# Patient Record
Sex: MALE | Race: WHITE | Employment: OTHER | URBAN - METROPOLITAN AREA
[De-identification: names, ages, dates, MRNs, and addresses within clinical notes are randomized per-mention and may not be internally consistent; named-entity substitution may affect disease eponyms.]

---

## 2017-05-04 ENCOUNTER — OFFICE VISIT (OUTPATIENT)
Dept: ORTHOPEDIC SURGERY | Facility: CLINIC | Age: 67
End: 2017-05-04

## 2017-05-04 VITALS
WEIGHT: 267 LBS | DIASTOLIC BLOOD PRESSURE: 82 MMHG | HEART RATE: 70 BPM | TEMPERATURE: 95.8 F | BODY MASS INDEX: 33.37 KG/M2 | SYSTOLIC BLOOD PRESSURE: 160 MMHG

## 2017-05-04 DIAGNOSIS — M17.0 BILATERAL PRIMARY OSTEOARTHRITIS OF KNEE: Primary | ICD-10-CM

## 2017-05-04 DIAGNOSIS — R60.0 BILATERAL EDEMA OF LOWER EXTREMITY: ICD-10-CM

## 2017-05-04 DIAGNOSIS — Z86.718 HISTORY OF BLOOD CLOTS: ICD-10-CM

## 2017-05-04 NOTE — MR AVS SNAPSHOT
Visit Information Date & Time Provider Department Dept. Phone Encounter #  
 5/4/2017  8:00 AM Hector Yun MD [address] [facility] [phone] [id number] Upcoming Health Maintenance Date Due Hepatitis C Screening 1950 DTaP/Tdap/Td series (1 - Tdap) 7/17/1973 FOBT Q 1 YEAR AGE 50-75 7/17/2002 ZOSTER VACCINE AGE 60> 7/17/2012 INFLUENZA AGE 9 TO ADULT 8/1/2017 Allergies as of 5/4/2017  Review Complete On: 5/4/2017 By: Hector Yun MD  
  
 Severity Noted Reaction Type Reactions Aspirin  11/12/2012   Intolerance Other (comments) Stomach bleed Metoprolol  06/09/2010    Other (comments) Sexual dysfunction Pcn [Penicillins]  06/09/2010    Rash Current Immunizations  Never Reviewed No immunizations on file. Not reviewed this visit You Were Diagnosed With   
  
 Codes Comments Bilateral primary osteoarthritis of knee    -  Primary ICD-10-CM: M17.0 ICD-9-CM: 715.16 History of blood clots     ICD-10-CM: Z86.718 ICD-9-CM: V12.51 Bilateral edema of lower extremity     ICD-10-CM: R60.0 ICD-9-CM: 953. 3 Vitals BP Pulse Temp Weight(growth percentile) BMI Smoking Status 160/82 (BP 1 Location: Left arm, BP Patient Position: Sitting) 70 95.8 °F (35.4 °C) (Oral) 267 lb (121.1 kg) 33.37 kg/m2 Never Smoker BMI and BSA Data Body Mass Index Body Surface Area  
 33.37 kg/m 2 2.53 m 2 Preferred Pharmacy Pharmacy Name Phone [address] [place] [address], [address] [place] [address] [phone] Your Updated Medication List  
  
   
This list is accurate as of: 5/4/17  9:17 AM.  Always use your most recent med list.  
  
  
  
  
 ACETAMINOPHEN PM PO Take  by mouth. ADVIL LIQUI-GEL PO Take  by mouth. albuterol sulfate 2.5 mg/0.5 mL Nebu nebulizer solution Commonly known as:  PROVENTIL;VENTOLIN  
2.5 mg by Nebulization route once as needed. amLODIPine-benazepril 5-20 mg per capsule Commonly known as:  LOTREL  
  
 BENADRYL ALLERGY PO Take  by mouth. buPROPion  mg SR tablet Commonly known as:  Jules Apo Take  by mouth two (2) times a day. clindamycin 300 mg capsule Commonly known as:  CLEOCIN Take 300 mg by mouth three (3) times daily. fluocinoNIDE 0.05 % topical cream  
Commonly known as:  LIDEX Apply  to affected area two (2) times a day. LORazepam 0.5 mg tablet Commonly known as:  ATIVAN Take 1 Tab by mouth every six (6) hours. Max Daily Amount: 2 mg.  
  
 magnesium oxide 400 mg tablet Commonly known as:  MAG-OX Take 400 mg by mouth daily. OTHER Antibiotic mouthwash PriLOSEC 20 mg capsule Generic drug:  omeprazole Take 20 mg by mouth two (2) times daily (with meals). promethazine 25 mg tablet Commonly known as:  PHENERGAN Take 1 Tab by mouth every six (6) hours as needed for Nausea. tiZANidine 4 mg tablet Commonly known as:  Laretta Bolk Take 2-3 Tabs by mouth three (3) times daily as needed (spasm). Dose change  
  
 triamcinolone acetonide 0.1 % ointment Commonly known as:  KENALOG Apply  to affected area two (2) times a day. use thin layer VITAMIN D2 50,000 unit capsule Generic drug:  ergocalciferol Take 50,000 Units by mouth as directed. warfarin 5 mg tablet Commonly known as:  COUMADIN Take 5 mg by mouth daily. ZOLOFT 100 mg tablet Generic drug:  sertraline Take 100 mg by mouth daily. We Performed the Following AMB POC XRAY, KNEE; COMPLETE, 4+ VIEW [75579 CPT(R)] AMB POC XRAY, KNEE; COMPLETE, 4+ VIEW [32841 CPT(R)] REFERRAL TO VASCULAR SURGERY [QNL879 Custom] Comments:  
 Eval and treat history of blood clots and edema to bilateral lower legs Referral Information Referral ID Referred By Referred To  
  
 [id number] Bean Kent MD   
   [address] [place] [address] Kuldip Calvert [facility] [address], [address] Phone: [phone] Fax: [phone] Visits Status Start Date End Date 1 New Request 5/4/17 5/4/18 If your referral has a status of pending review or denied, additional information will be sent to support the outcome of this decision. Patient Instructions Patient's blood pressure was elevated at today's office visit. Patient instructed to contact  primary care physician for treatment. Please follow up with us after you see Dr. Angeliac Gutierrez. Knee Pain or Injury: Care Instructions Your Care Instructions Injuries are a common cause of knee problems. Sudden (acute) injuries may be caused by a direct blow to the knee. They can also be caused by abnormal twisting, bending, or falling on the knee. Pain, bruising, or swelling may be severe, and may start within minutes of the injury. Overuse is another cause of knee pain. Other causes are climbing stairs, kneeling, and other activities that use the knee. Everyday wear and tear, especially as you get older, also can cause knee pain. Rest, along with home treatment, often relieves pain and allows your knee to heal. If you have a serious knee injury, you may need tests and treatment. Follow-up care is a key part of your treatment and safety. Be sure to make and go to all appointments, and call your doctor if you are having problems. It's also a good idea to know your test results and keep a list of the medicines you take. How can you care for yourself at home? · Be safe with medicines. Read and follow all instructions on the label. ¨ If the doctor gave you a prescription medicine for pain, take it as prescribed. ¨ If you are not taking a prescription pain medicine, ask your doctor if you can take an over-the-counter medicine. · Rest and protect your knee. Take a break from any activity that may cause pain. · Put ice or a cold pack on your knee for 10 to 20 minutes at a time. Put a thin cloth between the ice and your skin. · Prop up a sore knee on a pillow when you ice it or anytime you sit or lie down for the next 3 days. Try to keep it above the level of your heart. This will help reduce swelling. · If your knee is not swollen, you can put moist heat, a heating pad, or a warm cloth on your knee. · If your doctor recommends an elastic bandage, sleeve, or other type of support for your knee, wear it as directed. · Follow your doctor's instructions about how much weight you can put on your leg. Use a cane, crutches, or a walker as instructed. · Follow your doctor's instructions about activity during your healing process. If you can do mild exercise, slowly increase your activity. · Reach and stay at a healthy weight. Extra weight can strain the joints, especially the knees and hips, and make the pain worse. Losing even a few pounds may help. When should you call for help? Call 911 anytime you think you may need emergency care. For example, call if: 
· You have symptoms of a blood clot in your lung (called a pulmonary embolism). These may include: 
¨ Sudden chest pain. ¨ Trouble breathing. ¨ Coughing up blood. Call your doctor now or seek immediate medical care if: 
· You have severe or increasing pain. · Your leg or foot turns cold or changes color. · You cannot stand or put weight on your knee. · Your knee looks twisted or bent out of shape. · You cannot move your knee. · You have signs of infection, such as: 
¨ Increased pain, swelling, warmth, or redness. ¨ Red streaks leading from the knee. ¨ Pus draining from a place on your knee. ¨ A fever. · You have signs of a blood clot in your leg (called a deep vein thrombosis), such as: 
¨ Pain in your calf, back of the knee, thigh, or groin. ¨ Redness and swelling in your leg or groin. Watch closely for changes in your health, and be sure to contact your doctor if: 
· You have tingling, weakness, or numbness in your knee. · You have any new symptoms, such as swelling. · You have bruises from a knee injury that last longer than 2 weeks. · You do not get better as expected. Where can you learn more? Go to http://ravi-salvador.info/. Enter K195 in the search box to learn more about \"Knee Pain or Injury: Care Instructions. \" Current as of: May 27, 2016 Content Version: 11.2 © 4888-6632 [employer]. Care instructions adapted under license by [employer] (which disclaims liability or warranty for this information). If you have questions about a medical condition or this instruction, always ask your healthcare professional. [address] any warranty or liability for your use of this information. Introducing \[facility] [place] [facility]\"" & HEALTH SERVICES! [employer] introduces [employer] patient portal. Now you can access parts of your medical record, email your doctor's office, and request medication refills online. 1. In your internet browser, go to [email or web address] 2. Click on the First Time User? Click Here link in the Sign In box. You will see the New Member Sign Up page. 3. Enter your [employer] Access Code exactly as it appears below. You will not need to use this code after youve completed the sign-up process. If you do not sign up before the expiration date, you must request a new code. · [employer] Access Code: [email or web address] Expires: 8/2/2017  9:17 AM 
 
4. Enter the last four digits of your Social Security Number (xxxx) and Date of Birth (mm/dd/yyyy) as indicated and click Submit. You will be taken to the next sign-up page. 5. Create a [employer] ID. This will be your [employer] login ID and cannot be changed, so think of one that is secure and easy to remember. 6. Create a [employer] password. You can change your password at any time. 7. Enter your Password Reset Question and Answer. This can be used at a later time if you forget your password. 8. Enter your e-mail address. You will receive e-mail notification when new information is available in [address] 9. Click Sign Up. You can now view and download portions of your medical record. 10. Click the Download Summary menu link to download a portable copy of your medical information. If you have questions, please visit the Frequently Asked Questions section of the [employer] website. Remember, [employer] is NOT to be used for urgent needs. For medical emergencies, dial 911. Now available from your iPhone and Android! Please provide this summary of care documentation to your next provider. Your primary care clinician is listed as TOMMIE RASHEED. If you have any questions after today's visit, please call [phone].

## 2017-05-04 NOTE — PROGRESS NOTES
Patient: José Miguel Martinez                MRN: [id number]       SSN: [id number]  YOB: 1950        AGE: 61 y.o. SEX: male  Body mass index is 33.37 kg/(m^2). PCP: Elizabeth Shoemaker MD  05/04/17    HISTORY: Mr. Agustina Wilcox is a very pleasant gentleman who worked previous construction and shipyard tanks. He did a lot of kneeling. He has ended up developing very severe end-stage arthritis involving both knees. In fact, we would have to have a hinge back-up to repair them. He suffers from very quite severe peripheral vascular disease. Apparently he had a couple blood clots in the left arm. Dr. Kate Conti saw him a few years ago. He has known fairly significant and severe venous stasis with at least 2+ pitting edema distally although with good pulse. I am concerned with regards to his vasculature. We are going to get an opinion from Dr. Zachariah Singh. The pain is severe. It wakes him up at night. He walks with a cane. No complaints of groin pain. He denies shortness of breath or chest pain. All systems are reviewed and are positive for lower leg swelling and occasional shortness of breath on exertion. PHYSICAL EXAMINATION:  He is in no acute distress. There is no respiratory compromise or indrawing. There is no scleral icterus. There is no JVD. EOM is normal.  The hips rotate adequately. He has bilateral good pulses. At least 2+ pitting edema distally and fairly significant venous stasis. Compartments are soft. He has fairly significant neuropathy distally as well. He told me he was gored by a goat and had injury to his medial thigh a couple years ago with numbness associated with this. [place] was at a friend's farm. IMPRESSION:  My overall impression is quite severe end-stage arthritis involving both knees. He also has fairly severe venous stasis and lymphedema, peripheral edema. PLAN:  I am going to get an opinion from Dr. Zachariah Singh. The patient is interested in knee replacement surgery.  I will see him back afterwards. I would recommend staging them about three months apart. REVIEW OF SYSTEMS:      CON: negative for weight loss, fever  EYE: negative for double vision  ENT: negative for hoarseness  RS:   negative for Tb  GI:    negative for blood in stool  :  negative for blood in urine  Other systems reviewed and noted below. Past Medical History:   Diagnosis Date    Arthritis     CTS (carpal tunnel syndrome)     DJD (degenerative joint disease) of knee     Bilateral; endstage degenerative arthritis of right knee    Gastric ulcer, unspecified as acute or chronic, without mention of hemorrhage or perforation     patient listed stomach problems or ulcers    Hypertension     Knee pain     Lumbar pain     Medial meniscus tear May 2011    Right knee       Family History   Problem Relation Age of Onset    Hypertension Father     Asthma Father     Arthritis-osteo Father        Current Outpatient Prescriptions   Medication Sig Dispense Refill    DIPHENHYDRAMINE HCL (BENADRYL ALLERGY PO) Take  by mouth.  IBUPROFEN (ADVIL LIQUI-GEL PO) Take  by mouth.  amLODIPine-benazepril (LOTREL) 5-20 mg per capsule       ergocalciferol (VITAMIN D) 50,000 unit capsule Take 50,000 Units by mouth as directed.  buPROPion SR (WELLBUTRIN SR) 150 mg SR tablet Take  by mouth two (2) times a day.  fluocinoNIDE (LIDEX) 0.05 % topical cream Apply  to affected area two (2) times a day.  magnesium oxide (MAG-OX) 400 mg tablet Take 400 mg by mouth daily.  omeprazole (PRILOSEC) 20 mg capsule Take 20 mg by mouth two (2) times daily (with meals).  albuterol sulfate (PROVENTIL;VENTOLIN) 2.5 mg/0.5 mL Nebu 2.5 mg by Nebulization route once as needed.  sertraline (ZOLOFT) 100 mg tablet Take 100 mg by mouth daily.  LORazepam (ATIVAN) 0.5 mg tablet Take 1 Tab by mouth every six (6) hours.  Max Daily Amount: 2 mg. 12 Tab 0    tiZANidine (ZANAFLEX) 4 mg tablet Take 2-3 Tabs by mouth three (3) times daily as needed (spasm). Dose change 720 Tab 5    warfarin (COUMADIN) 5 mg tablet Take 5 mg by mouth daily.  clindamycin (CLEOCIN) 300 mg capsule Take 300 mg by mouth three (3) times daily.  OTHER Antibiotic mouthwash      promethazine (PHENERGAN) 25 mg tablet Take 1 Tab by mouth every six (6) hours as needed for Nausea. 25 Tab 0    ACETAMINOPHEN/DP-HYDRAM HCL (ACETAMINOPHEN PM PO) Take  by mouth.  triamcinolone acetonide (KENALOG) 0.1 % ointment Apply  to affected area two (2) times a day. 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 Social History Main Topics    Smoking status: Never Smoker    Smokeless tobacco: Not on file    Alcohol use No    Drug use: No    Sexual activity: Not on file     Other Topics Concern    Not on file     Social History Narrative       Visit Vitals    /82 (BP 1 Location: Left arm, BP Patient Position: Sitting)    Pulse 70    Temp 95.8 °F (35.4 °C) (Oral)    Wt 267 lb (121.1 kg)    BMI 33.37 kg/m2         PHYSICAL EXAMINATION:  GENERAL: Alert and oriented x3, in no acute distress, well-developed, well-nourished, afebrile. HEART: No JVD.   EYES: No scleral icterus   NECK: No significant lymphadenopathy   LUNGS: No respiratory compromise or indrawing  ABDOMEN: Soft, non-tender, non-distended. Electronically signed by:  Anastasiya Grissom MD

## 2017-05-04 NOTE — PATIENT INSTRUCTIONS
Patient's blood pressure was elevated at today's office visit. Patient instructed to contact  primary care physician for treatment. Please follow up with us after you see Dr. Juliana Gale. Knee Pain or Injury: Care Instructions  Your Care Instructions    Injuries are a common cause of knee problems. Sudden (acute) injuries may be caused by a direct blow to the knee. They can also be caused by abnormal twisting, bending, or falling on the knee. Pain, bruising, or swelling may be severe, and may start within minutes of the injury. Overuse is another cause of knee pain. Other causes are climbing stairs, kneeling, and other activities that use the knee. Everyday wear and tear, especially as you get older, also can cause knee pain. Rest, along with home treatment, often relieves pain and allows your knee to heal. If you have a serious knee injury, you may need tests and treatment. Follow-up care is a key part of your treatment and safety. Be sure to make and go to all appointments, and call your doctor if you are having problems. It's also a good idea to know your test results and keep a list of the medicines you take. How can you care for yourself at home? · Be safe with medicines. Read and follow all instructions on the label. ¨ If the doctor gave you a prescription medicine for pain, take it as prescribed. ¨ If you are not taking a prescription pain medicine, ask your doctor if you can take an over-the-counter medicine. · Rest and protect your knee. Take a break from any activity that may cause pain. · Put ice or a cold pack on your knee for 10 to 20 minutes at a time. Put a thin cloth between the ice and your skin. · Prop up a sore knee on a pillow when you ice it or anytime you sit or lie down for the next 3 days. Try to keep it above the level of your heart. This will help reduce swelling. · If your knee is not swollen, you can put moist heat, a heating pad, or a warm cloth on your knee.   · If your doctor recommends an elastic bandage, sleeve, or other type of support for your knee, wear it as directed. · Follow your doctor's instructions about how much weight you can put on your leg. Use a cane, crutches, or a walker as instructed. · Follow your doctor's instructions about activity during your healing process. If you can do mild exercise, slowly increase your activity. · Reach and stay at a healthy weight. Extra weight can strain the joints, especially the knees and hips, and make the pain worse. Losing even a few pounds may help. When should you call for help? Call 911 anytime you think you may need emergency care. For example, call if:  · You have symptoms of a blood clot in your lung (called a pulmonary embolism). These may include:  ¨ Sudden chest pain. ¨ Trouble breathing. ¨ Coughing up blood. Call your doctor now or seek immediate medical care if:  · You have severe or increasing pain. · Your leg or foot turns cold or changes color. · You cannot stand or put weight on your knee. · Your knee looks twisted or bent out of shape. · You cannot move your knee. · You have signs of infection, such as:  ¨ Increased pain, swelling, warmth, or redness. ¨ Red streaks leading from the knee. ¨ Pus draining from a place on your knee. ¨ A fever. · You have signs of a blood clot in your leg (called a deep vein thrombosis), such as:  ¨ Pain in your calf, back of the knee, thigh, or groin. ¨ Redness and swelling in your leg or groin. Watch closely for changes in your health, and be sure to contact your doctor if:  · You have tingling, weakness, or numbness in your knee. · You have any new symptoms, such as swelling. · You have bruises from a knee injury that last longer than 2 weeks. · You do not get better as expected. Where can you learn more? Go to http://ravi-salvador.info/. Enter K195 in the search box to learn more about \"Knee Pain or Injury: Care Instructions. \"  Current as of: May 27, 2016  Content Version: 11.2  © 4105-2004 [employer]. Care instructions adapted under license by [employer] (which disclaims liability or warranty for this information). If you have questions about a medical condition or this instruction, always ask your healthcare professional. [address] any warranty or liability for your use of this information.

## 2018-07-14 PROCEDURE — 94640 AIRWAY INHALATION TREATMENT: CPT

## 2018-07-14 PROCEDURE — 99285 EMERGENCY DEPT VISIT HI MDM: CPT

## 2018-07-14 RX ORDER — FAMOTIDINE 10 MG/ML
20 INJECTION INTRAVENOUS
Status: COMPLETED | OUTPATIENT
Start: 2018-07-15 | End: 2018-07-15

## 2018-07-14 RX ORDER — DEXAMETHASONE SODIUM PHOSPHATE 4 MG/ML
10 INJECTION, SOLUTION INTRA-ARTICULAR; INTRALESIONAL; INTRAMUSCULAR; INTRAVENOUS; SOFT TISSUE
Status: COMPLETED | OUTPATIENT
Start: 2018-07-15 | End: 2018-07-15

## 2018-07-14 RX ORDER — DIPHENHYDRAMINE HYDROCHLORIDE 50 MG/ML
50 INJECTION, SOLUTION INTRAMUSCULAR; INTRAVENOUS ONCE
Status: COMPLETED | OUTPATIENT
Start: 2018-07-15 | End: 2018-07-15

## 2018-07-15 ENCOUNTER — APPOINTMENT (OUTPATIENT)
Dept: GENERAL RADIOLOGY | Age: 68
DRG: 916 | End: 2018-07-15
Attending: EMERGENCY MEDICINE
Payer: MEDICARE

## 2018-07-15 ENCOUNTER — HOSPITAL ENCOUNTER (INPATIENT)
Age: 68
LOS: 1 days | Discharge: HOME | DRG: 916 | End: 2018-07-16
Attending: EMERGENCY MEDICINE | Admitting: FAMILY MEDICINE
Payer: MEDICARE

## 2018-07-15 ENCOUNTER — APPOINTMENT (OUTPATIENT)
Dept: CT IMAGING | Age: 68
DRG: 916 | End: 2018-07-15
Attending: NURSE PRACTITIONER
Payer: MEDICARE

## 2018-07-15 ENCOUNTER — APPOINTMENT (OUTPATIENT)
Dept: GENERAL RADIOLOGY | Age: 68
DRG: 916 | End: 2018-07-15
Attending: NURSE PRACTITIONER
Payer: MEDICARE

## 2018-07-15 DIAGNOSIS — T78.3XXA ANGIOTENSIN CONVERTING ENZYME INHIBITOR-AGGRAVATED ANGIOEDEMA, INITIAL ENCOUNTER: ICD-10-CM

## 2018-07-15 DIAGNOSIS — T78.3XXA ANGIOEDEMA, INITIAL ENCOUNTER: Primary | ICD-10-CM

## 2018-07-15 DIAGNOSIS — T46.4X5A ANGIOTENSIN CONVERTING ENZYME INHIBITOR-AGGRAVATED ANGIOEDEMA, INITIAL ENCOUNTER: ICD-10-CM

## 2018-07-15 LAB
ABO + RH BLD: NORMAL
ALBUMIN SERPL-MCNC: 3.6 G/DL (ref 3.4–5)
ALBUMIN/GLOB SERPL: 1.1 {RATIO} (ref 0.8–1.7)
ALP SERPL-CCNC: 69 U/L (ref 45–117)
ALT SERPL-CCNC: 23 U/L (ref 16–61)
ANION GAP SERPL CALC-SCNC: 9 MMOL/L (ref 3–18)
APPEARANCE UR: NORMAL
AST SERPL-CCNC: 18 U/L (ref 15–37)
BASOPHILS # BLD: 0 K/UL (ref 0–0.1)
BASOPHILS NFR BLD: 0 % (ref 0–2)
BILIRUB SERPL-MCNC: 0.4 MG/DL (ref 0.2–1)
BILIRUB UR QL: NEGATIVE
BLOOD GROUP ANTIBODIES SERPL: NORMAL
BUN SERPL-MCNC: 21 MG/DL (ref 7–18)
BUN/CREAT SERPL: 15 (ref 12–20)
CALCIUM SERPL-MCNC: 8.2 MG/DL (ref 8.5–10.1)
CHLORIDE SERPL-SCNC: 105 MMOL/L (ref 100–108)
CK MB CFR SERPL CALC: 1.2 % (ref 0–4)
CK MB SERPL-MCNC: 3 NG/ML (ref 5–25)
CK SERPL-CCNC: 259 U/L (ref 39–308)
CO2 SERPL-SCNC: 28 MMOL/L (ref 21–32)
COLOR UR: YELLOW
CREAT SERPL-MCNC: 1.38 MG/DL (ref 0.6–1.3)
DIFFERENTIAL METHOD BLD: ABNORMAL
EOSINOPHIL # BLD: 0.3 K/UL (ref 0–0.4)
EOSINOPHIL NFR BLD: 2 % (ref 0–5)
ERYTHROCYTE [DISTWIDTH] IN BLOOD BY AUTOMATED COUNT: 14.4 % (ref 11.6–14.5)
GLOBULIN SER CALC-MCNC: 3.2 G/DL (ref 2–4)
GLUCOSE SERPL-MCNC: 96 MG/DL (ref 74–99)
GLUCOSE UR STRIP.AUTO-MCNC: NEGATIVE MG/DL
HCT VFR BLD AUTO: 36.4 % (ref 36–48)
HGB BLD-MCNC: 12.4 G/DL (ref 13–16)
HGB UR QL STRIP: NEGATIVE
INR PPP: 1.1 (ref 0.8–1.2)
KETONES UR QL STRIP.AUTO: NEGATIVE MG/DL
LEUKOCYTE ESTERASE UR QL STRIP.AUTO: NEGATIVE
LYMPHOCYTES # BLD: 2 K/UL (ref 0.9–3.6)
LYMPHOCYTES NFR BLD: 15 % (ref 21–52)
MAGNESIUM SERPL-MCNC: 2.6 MG/DL (ref 1.6–2.6)
MCH RBC QN AUTO: 30.2 PG (ref 24–34)
MCHC RBC AUTO-ENTMCNC: 34.1 G/DL (ref 31–37)
MCV RBC AUTO: 88.6 FL (ref 74–97)
MONOCYTES # BLD: 1.5 K/UL (ref 0.05–1.2)
MONOCYTES NFR BLD: 11 % (ref 3–10)
NEUTS SEG # BLD: 10 K/UL (ref 1.8–8)
NEUTS SEG NFR BLD: 72 % (ref 40–73)
NITRITE UR QL STRIP.AUTO: NEGATIVE
PH UR STRIP: 6 [PH] (ref 5–8)
PLATELET # BLD AUTO: 304 K/UL (ref 135–420)
PMV BLD AUTO: 10.4 FL (ref 9.2–11.8)
POTASSIUM SERPL-SCNC: 4.3 MMOL/L (ref 3.5–5.5)
PROT SERPL-MCNC: 6.8 G/DL (ref 6.4–8.2)
PROT UR STRIP-MCNC: NEGATIVE MG/DL
PROTHROMBIN TIME: 13.8 SEC (ref 11.5–15.2)
RBC # BLD AUTO: 4.11 M/UL (ref 4.7–5.5)
SODIUM SERPL-SCNC: 142 MMOL/L (ref 136–145)
SP GR UR REFRACTOMETRY: 1.01 (ref 1–1.03)
SPECIMEN EXP DATE BLD: NORMAL
TROPONIN I SERPL-MCNC: <0.02 NG/ML (ref 0–0.04)
TSH SERPL DL<=0.05 MIU/L-ACNC: 1.53 UIU/ML (ref 0.36–3.74)
UROBILINOGEN UR QL STRIP.AUTO: 0.2 EU/DL (ref 0.2–1)
WBC # BLD AUTO: 13.7 K/UL (ref 4.6–13.2)

## 2018-07-15 PROCEDURE — 83735 ASSAY OF MAGNESIUM: CPT | Performed by: NURSE PRACTITIONER

## 2018-07-15 PROCEDURE — 71045 X-RAY EXAM CHEST 1 VIEW: CPT

## 2018-07-15 PROCEDURE — 93005 ELECTROCARDIOGRAM TRACING: CPT

## 2018-07-15 PROCEDURE — 74011250636 HC RX REV CODE- 250/636: Performed by: EMERGENCY MEDICINE

## 2018-07-15 PROCEDURE — 74011000250 HC RX REV CODE- 250: Performed by: NURSE PRACTITIONER

## 2018-07-15 PROCEDURE — 74011636637 HC RX REV CODE- 636/637: Performed by: INTERNAL MEDICINE

## 2018-07-15 PROCEDURE — 86900 BLOOD TYPING SEROLOGIC ABO: CPT | Performed by: EMERGENCY MEDICINE

## 2018-07-15 PROCEDURE — P9059 PLASMA, FRZ BETWEEN 8-24HOUR: HCPCS | Performed by: NURSE PRACTITIONER

## 2018-07-15 PROCEDURE — 94640 AIRWAY INHALATION TREATMENT: CPT

## 2018-07-15 PROCEDURE — 82550 ASSAY OF CK (CPK): CPT | Performed by: NURSE PRACTITIONER

## 2018-07-15 PROCEDURE — 74011636320 HC RX REV CODE- 636/320: Performed by: EMERGENCY MEDICINE

## 2018-07-15 PROCEDURE — 96365 THER/PROPH/DIAG IV INF INIT: CPT

## 2018-07-15 PROCEDURE — 74011250636 HC RX REV CODE- 250/636: Performed by: PHYSICIAN ASSISTANT

## 2018-07-15 PROCEDURE — 74011000250 HC RX REV CODE- 250: Performed by: EMERGENCY MEDICINE

## 2018-07-15 PROCEDURE — 85610 PROTHROMBIN TIME: CPT | Performed by: NURSE PRACTITIONER

## 2018-07-15 PROCEDURE — 83520 IMMUNOASSAY QUANT NOS NONAB: CPT

## 2018-07-15 PROCEDURE — 85025 COMPLETE CBC W/AUTO DIFF WBC: CPT | Performed by: NURSE PRACTITIONER

## 2018-07-15 PROCEDURE — 70360 X-RAY EXAM OF NECK: CPT

## 2018-07-15 PROCEDURE — 80053 COMPREHEN METABOLIC PANEL: CPT | Performed by: NURSE PRACTITIONER

## 2018-07-15 PROCEDURE — 96361 HYDRATE IV INFUSION ADD-ON: CPT

## 2018-07-15 PROCEDURE — 77030029684 HC NEB SM VOL KT MONA -A

## 2018-07-15 PROCEDURE — 96372 THER/PROPH/DIAG INJ SC/IM: CPT

## 2018-07-15 PROCEDURE — 74011250637 HC RX REV CODE- 250/637: Performed by: FAMILY MEDICINE

## 2018-07-15 PROCEDURE — 74011000250 HC RX REV CODE- 250: Performed by: PHYSICIAN ASSISTANT

## 2018-07-15 PROCEDURE — 96375 TX/PRO/DX INJ NEW DRUG ADDON: CPT

## 2018-07-15 PROCEDURE — 84443 ASSAY THYROID STIM HORMONE: CPT | Performed by: NURSE PRACTITIONER

## 2018-07-15 PROCEDURE — 65270000029 HC RM PRIVATE

## 2018-07-15 PROCEDURE — 36430 TRANSFUSION BLD/BLD COMPNT: CPT

## 2018-07-15 PROCEDURE — 74011250636 HC RX REV CODE- 250/636: Performed by: FAMILY MEDICINE

## 2018-07-15 PROCEDURE — 74011250637 HC RX REV CODE- 250/637: Performed by: INTERNAL MEDICINE

## 2018-07-15 PROCEDURE — 70491 CT SOFT TISSUE NECK W/DYE: CPT

## 2018-07-15 PROCEDURE — 96366 THER/PROPH/DIAG IV INF ADDON: CPT

## 2018-07-15 PROCEDURE — 86160 COMPLEMENT ANTIGEN: CPT

## 2018-07-15 PROCEDURE — 74011000258 HC RX REV CODE- 258: Performed by: EMERGENCY MEDICINE

## 2018-07-15 PROCEDURE — 81003 URINALYSIS AUTO W/O SCOPE: CPT | Performed by: NURSE PRACTITIONER

## 2018-07-15 PROCEDURE — 74011250636 HC RX REV CODE- 250/636: Performed by: NURSE PRACTITIONER

## 2018-07-15 RX ORDER — IPRATROPIUM BROMIDE AND ALBUTEROL SULFATE 2.5; .5 MG/3ML; MG/3ML
3 SOLUTION RESPIRATORY (INHALATION)
Status: DISCONTINUED | OUTPATIENT
Start: 2018-07-15 | End: 2018-07-15 | Stop reason: SDUPTHER

## 2018-07-15 RX ORDER — EPINEPHRINE 1 MG/ML
0.3 INJECTION, SOLUTION, CONCENTRATE INTRAVENOUS ONCE
Status: COMPLETED | OUTPATIENT
Start: 2018-07-15 | End: 2018-07-15

## 2018-07-15 RX ORDER — EPINEPHRINE HCL IN DEXTROSE 5% 4MG/250ML
1-10 PLASTIC BAG, INJECTION (ML) INTRAVENOUS
Status: DISCONTINUED | OUTPATIENT
Start: 2018-07-15 | End: 2018-07-15

## 2018-07-15 RX ORDER — FAMOTIDINE 20 MG/50ML
20 INJECTION, SOLUTION INTRAVENOUS EVERY 12 HOURS
Status: DISCONTINUED | OUTPATIENT
Start: 2018-07-15 | End: 2018-07-15 | Stop reason: RX

## 2018-07-15 RX ORDER — DIPHENHYDRAMINE HCL 25 MG
25 CAPSULE ORAL
Status: DISCONTINUED | OUTPATIENT
Start: 2018-07-15 | End: 2018-07-16 | Stop reason: HOSPADM

## 2018-07-15 RX ORDER — EPINEPHRINE 1 MG/ML
0.2 INJECTION, SOLUTION, CONCENTRATE INTRAVENOUS
Status: COMPLETED | OUTPATIENT
Start: 2018-07-15 | End: 2018-07-15

## 2018-07-15 RX ORDER — SODIUM CHLORIDE 9 MG/ML
250 INJECTION, SOLUTION INTRAVENOUS AS NEEDED
Status: DISCONTINUED | OUTPATIENT
Start: 2018-07-15 | End: 2018-07-16 | Stop reason: HOSPADM

## 2018-07-15 RX ORDER — PANTOPRAZOLE SODIUM 40 MG/1
40 TABLET, DELAYED RELEASE ORAL
Status: DISCONTINUED | OUTPATIENT
Start: 2018-07-16 | End: 2018-07-16 | Stop reason: HOSPADM

## 2018-07-15 RX ORDER — DIPHENHYDRAMINE HYDROCHLORIDE 50 MG/ML
50 INJECTION, SOLUTION INTRAMUSCULAR; INTRAVENOUS EVERY 4 HOURS
Status: DISCONTINUED | OUTPATIENT
Start: 2018-07-15 | End: 2018-07-16 | Stop reason: HOSPADM

## 2018-07-15 RX ORDER — ACETYLCYSTEINE 100 MG/ML
4 SOLUTION ORAL; RESPIRATORY (INHALATION)
Status: DISCONTINUED | OUTPATIENT
Start: 2018-07-15 | End: 2018-07-15

## 2018-07-15 RX ORDER — HEPARIN SODIUM 5000 [USP'U]/ML
5000 INJECTION, SOLUTION INTRAVENOUS; SUBCUTANEOUS EVERY 8 HOURS
Status: DISCONTINUED | OUTPATIENT
Start: 2018-07-15 | End: 2018-07-15 | Stop reason: SDUPTHER

## 2018-07-15 RX ORDER — EPINEPHRINE 1 MG/ML
INJECTION, SOLUTION, CONCENTRATE INTRAVENOUS
Status: DISPENSED
Start: 2018-07-15 | End: 2018-07-15

## 2018-07-15 RX ORDER — AMLODIPINE BESYLATE 10 MG/1
10 TABLET ORAL DAILY
Status: DISCONTINUED | OUTPATIENT
Start: 2018-07-16 | End: 2018-07-16 | Stop reason: HOSPADM

## 2018-07-15 RX ORDER — IPRATROPIUM BROMIDE AND ALBUTEROL SULFATE 2.5; .5 MG/3ML; MG/3ML
3 SOLUTION RESPIRATORY (INHALATION)
Status: COMPLETED | OUTPATIENT
Start: 2018-07-15 | End: 2018-07-15

## 2018-07-15 RX ORDER — SERTRALINE HYDROCHLORIDE 50 MG/1
100 TABLET, FILM COATED ORAL DAILY
Status: DISCONTINUED | OUTPATIENT
Start: 2018-07-16 | End: 2018-07-16 | Stop reason: HOSPADM

## 2018-07-15 RX ORDER — BUPROPION HYDROCHLORIDE 150 MG/1
150 TABLET, EXTENDED RELEASE ORAL 2 TIMES DAILY
Status: DISCONTINUED | OUTPATIENT
Start: 2018-07-15 | End: 2018-07-16 | Stop reason: HOSPADM

## 2018-07-15 RX ORDER — DEXAMETHASONE SODIUM PHOSPHATE 4 MG/ML
4 INJECTION, SOLUTION INTRA-ARTICULAR; INTRALESIONAL; INTRAMUSCULAR; INTRAVENOUS; SOFT TISSUE EVERY 8 HOURS
Status: DISCONTINUED | OUTPATIENT
Start: 2018-07-15 | End: 2018-07-15

## 2018-07-15 RX ORDER — BUDESONIDE AND FORMOTEROL FUMARATE DIHYDRATE 160; 4.5 UG/1; UG/1
2 AEROSOL RESPIRATORY (INHALATION)
Status: DISCONTINUED | OUTPATIENT
Start: 2018-07-15 | End: 2018-07-16 | Stop reason: HOSPADM

## 2018-07-15 RX ORDER — PREDNISONE 20 MG/1
40 TABLET ORAL
Status: DISCONTINUED | OUTPATIENT
Start: 2018-07-15 | End: 2018-07-16 | Stop reason: HOSPADM

## 2018-07-15 RX ORDER — IPRATROPIUM BROMIDE AND ALBUTEROL SULFATE 2.5; .5 MG/3ML; MG/3ML
SOLUTION RESPIRATORY (INHALATION)
Status: DISPENSED
Start: 2018-07-15 | End: 2018-07-15

## 2018-07-15 RX ORDER — TERAZOSIN 10 MG/1
10 CAPSULE ORAL
COMMUNITY

## 2018-07-15 RX ORDER — TIZANIDINE 4 MG/1
8-12 TABLET ORAL
Status: DISCONTINUED | OUTPATIENT
Start: 2018-07-15 | End: 2018-07-16 | Stop reason: HOSPADM

## 2018-07-15 RX ORDER — SODIUM CHLORIDE 9 MG/ML
150 INJECTION, SOLUTION INTRAVENOUS ONCE
Status: COMPLETED | OUTPATIENT
Start: 2018-07-15 | End: 2018-07-15

## 2018-07-15 RX ORDER — ENOXAPARIN SODIUM 100 MG/ML
40 INJECTION SUBCUTANEOUS EVERY 24 HOURS
Status: DISCONTINUED | OUTPATIENT
Start: 2018-07-15 | End: 2018-07-16 | Stop reason: HOSPADM

## 2018-07-15 RX ORDER — FAMOTIDINE 20 MG/1
20 TABLET, FILM COATED ORAL 2 TIMES DAILY
Status: DISCONTINUED | OUTPATIENT
Start: 2018-07-15 | End: 2018-07-15 | Stop reason: SDUPTHER

## 2018-07-15 RX ORDER — EPINEPHRINE 0.1 MG/ML
0.3 INJECTION INTRACARDIAC; INTRAVENOUS
Status: DISCONTINUED | OUTPATIENT
Start: 2018-07-15 | End: 2018-07-15

## 2018-07-15 RX ORDER — EPINEPHRINE 1 MG/ML
0.3 INJECTION, SOLUTION, CONCENTRATE INTRAVENOUS
Status: DISCONTINUED | OUTPATIENT
Start: 2018-07-15 | End: 2018-07-16 | Stop reason: HOSPADM

## 2018-07-15 RX ORDER — ALBUTEROL SULFATE 2.5 MG/.5ML
2.5 SOLUTION RESPIRATORY (INHALATION)
Status: DISCONTINUED | OUTPATIENT
Start: 2018-07-15 | End: 2018-07-16 | Stop reason: HOSPADM

## 2018-07-15 RX ADMIN — DIPHENHYDRAMINE HYDROCHLORIDE 50 MG: 50 INJECTION, SOLUTION INTRAMUSCULAR; INTRAVENOUS at 17:44

## 2018-07-15 RX ADMIN — IOPAMIDOL 80 ML: 612 INJECTION, SOLUTION INTRAVENOUS at 02:41

## 2018-07-15 RX ADMIN — EPINEPHRINE 0.3 MG: 1 INJECTION, SOLUTION, CONCENTRATE INTRAVENOUS at 00:34

## 2018-07-15 RX ADMIN — SODIUM CHLORIDE 150 ML/HR: 900 INJECTION, SOLUTION INTRAVENOUS at 00:00

## 2018-07-15 RX ADMIN — DIPHENHYDRAMINE HYDROCHLORIDE 50 MG: 50 INJECTION, SOLUTION INTRAMUSCULAR; INTRAVENOUS at 23:06

## 2018-07-15 RX ADMIN — DEXAMETHASONE SODIUM PHOSPHATE 4 MG: 4 INJECTION, SOLUTION INTRAMUSCULAR; INTRAVENOUS at 15:35

## 2018-07-15 RX ADMIN — ENOXAPARIN SODIUM 40 MG: 40 INJECTION, SOLUTION INTRAVENOUS; SUBCUTANEOUS at 20:31

## 2018-07-15 RX ADMIN — DIPHENHYDRAMINE HYDROCHLORIDE 50 MG: 50 INJECTION, SOLUTION INTRAMUSCULAR; INTRAVENOUS at 15:28

## 2018-07-15 RX ADMIN — RACEPINEPHRINE HYDROCHLORIDE 0.42 ML: 11.25 SOLUTION RESPIRATORY (INHALATION) at 00:51

## 2018-07-15 RX ADMIN — DIPHENHYDRAMINE HYDROCHLORIDE 50 MG: 50 INJECTION, SOLUTION INTRAMUSCULAR; INTRAVENOUS at 09:41

## 2018-07-15 RX ADMIN — IPRATROPIUM BROMIDE AND ALBUTEROL SULFATE 3 ML: .5; 3 SOLUTION RESPIRATORY (INHALATION) at 16:35

## 2018-07-15 RX ADMIN — HEPARIN SODIUM 5000 UNITS: 5000 INJECTION, SOLUTION INTRAVENOUS; SUBCUTANEOUS at 15:35

## 2018-07-15 RX ADMIN — FAMOTIDINE 20 MG: 20 TABLET ORAL at 17:44

## 2018-07-15 RX ADMIN — SODIUM CHLORIDE 250 ML: 900 INJECTION, SOLUTION INTRAVENOUS at 03:57

## 2018-07-15 RX ADMIN — DEXAMETHASONE SODIUM PHOSPHATE 10 MG: 4 INJECTION, SOLUTION INTRAMUSCULAR; INTRAVENOUS at 00:14

## 2018-07-15 RX ADMIN — FAMOTIDINE 20 MG: 10 INJECTION, SOLUTION INTRAVENOUS at 00:09

## 2018-07-15 RX ADMIN — IPRATROPIUM BROMIDE AND ALBUTEROL SULFATE 3 ML: .5; 3 SOLUTION RESPIRATORY (INHALATION) at 01:45

## 2018-07-15 RX ADMIN — PREDNISONE 40 MG: 20 TABLET ORAL at 16:42

## 2018-07-15 RX ADMIN — BUPROPION HYDROCHLORIDE 150 MG: 150 TABLET, EXTENDED RELEASE ORAL at 20:32

## 2018-07-15 RX ADMIN — EPINEPHRINE 0.2 MG: 1 INJECTION, SOLUTION, CONCENTRATE INTRAVENOUS at 00:10

## 2018-07-15 RX ADMIN — DIPHENHYDRAMINE HYDROCHLORIDE 50 MG: 50 INJECTION, SOLUTION INTRAMUSCULAR; INTRAVENOUS at 00:05

## 2018-07-15 RX ADMIN — TIZANIDINE 12 MG: 4 TABLET ORAL at 20:30

## 2018-07-15 RX ADMIN — DEXTROSE MONOHYDRATE 2 MCG/MIN: 5 INJECTION, SOLUTION INTRAVENOUS at 01:07

## 2018-07-15 RX ADMIN — FAMOTIDINE 20 MG: 10 INJECTION, SOLUTION INTRAVENOUS at 09:41

## 2018-07-15 NOTE — PROGRESS NOTES
Arrived via wheelchair from ICU,oriented to room and use of callight with reach,urinal within reach,requested pt.to call for assistance OOB acknowledged understanding. Will cont. to monitor

## 2018-07-15 NOTE — IP AVS SNAPSHOT
[address] Patient: Sonya Castillo MRN: [id number] KRA:[birth year] A check harvey indicates which time of day the medication should be taken. My Medications START taking these medications Instructions Each Dose to Equal  
 Morning Noon Evening Bedtime  
 amLODIPine 10 mg tablet Commonly known as:  Retana Fanti Start taking on:  7/17/2018 Your last dose was: Your next dose is: Take 1 Tab by mouth daily. 10 mg  
    
   
   
   
  
 predniSONE 20 mg tablet Commonly known as:  Brit Martinez Start taking on:  7/17/2018 Your last dose was: Your next dose is: Take 2 Tabs by mouth daily (with breakfast) for 3 days. 40 mg CONTINUE taking these medications Instructions Each Dose to Equal  
 Morning Noon Evening Bedtime ACETAMINOPHEN PM PO Your last dose was: Your next dose is: Take  by mouth. ADVIL LIQUI-GEL PO Your last dose was: Your next dose is: Take  by mouth. albuterol sulfate 2.5 mg/0.5 mL Nebu nebulizer solution Commonly known as:  PROVENTIL;VENTOLIN Your last dose was: Your next dose is:    
   
   
 2.5 mg by Nebulization route once as needed. 2.5 mg  
    
   
   
   
  
 BENADRYL ALLERGY PO Your last dose was: Your next dose is: Take  by mouth. buPROPion  mg SR tablet Commonly known as:  Chase Hernandez Your last dose was: Your next dose is: Take  by mouth two (2) times a day. DULERA 200-5 mcg/actuation HFA inhaler Generic drug:  mometasone-formoterol Your last dose was: Your next dose is: Take 2 Puffs by inhalation two (2) times a day. 2 Puff fluocinoNIDE 0.05 % topical cream  
Commonly known as:  LIDEX Your last dose was: Your next dose is:    
   
   
 Apply  to affected area two (2) times a day. magnesium oxide 400 mg tablet Commonly known as:  MAG-OX Your last dose was: Your next dose is: Take 400 mg by mouth daily. 400 mg PriLOSEC 20 mg capsule Generic drug:  omeprazole Your last dose was: Your next dose is: Take 20 mg by mouth two (2) times daily (with meals). 20 mg  
    
   
   
   
  
 promethazine 25 mg tablet Commonly known as:  PHENERGAN Your last dose was: Your next dose is: Take 1 Tab by mouth every six (6) hours as needed for Nausea. 25 mg  
    
   
   
   
  
 terazosin 10 mg capsule Commonly known as:  HYTRIN Your last dose was: Your next dose is: Take 10 mg by mouth nightly. 10 mg  
    
   
   
   
  
 tiZANidine 4 mg tablet Commonly known as:  Lilia Singh Your last dose was: Your next dose is: Take 2-3 Tabs by mouth three (3) times daily as needed (spasm). Dose change 8-12 mg  
    
   
   
   
  
 triamcinolone acetonide 0.1 % ointment Commonly known as:  KENALOG Your last dose was: Your next dose is:    
   
   
 Apply  to affected area two (2) times a day. use thin layer VITAMIN D2 50,000 unit capsule Generic drug:  ergocalciferol Your last dose was: Your next dose is: Take 50,000 Units by mouth as directed. 83482 Units ZOLOFT 100 mg tablet Generic drug:  sertraline Your last dose was: Your next dose is: Take 100 mg by mouth daily. 100 mg  
    
   
   
   
  
  
STOP taking these medications   
 amLODIPine-benazepril 5-20 mg per capsule Commonly known as:  Real Rodriguez  
   
  
  
  
 Where to Get Your Medications Information on where to get these meds will be given to you by the nurse or doctor. ! Ask your nurse or doctor about these medications  
  amLODIPine 10 mg tablet  
 predniSONE 20 mg tablet

## 2018-07-15 NOTE — CONSULTS
[facility]  Pulmonary, Critical Care, and Sleep Medicine      Name: Bunny Don MRN: [id number]   : 1950 Hospital: [address] [place] [address] Dr   Date: 7/15/2018          Critical Care Initial Patient Consult    Requesting MD: Richar Grissom                                                 Reason for CC Consult: Acute Anaphylaxis / Angioedema     IMPRESSION:   · Acute Anaphylaxis / Angioedema - Unknown cause; Unknown exact onset, likely within 24 hours ago; reaction began with large urticaria. Pt was started on Lisinopril about 2 months ago. Given Racemic Epi x1, IM Epi x2; Briefly was started on Epi drip (now off); 50mg Benadryl IV, Decadron 10mg IV and Pepcid 20mg IV in ED. Pt also transfusing 2nd of 2u FFP. · Leukocytosis - Likely reactionary, 2/2 above  · Elevated Cr - Likely reactive, 2/2 above; will monitor  · Hx of HTN - Started taking Lisinopril about 2 months ago, per Pt  · Hx of Mesothelioma; Pneumonectomy RUL -   · Hx of GERD, Hiatal hernia, CTS, Arthritis      RECOMMENDATIONS:   · Resp -  Currently doing well on RA; SpO2 >94%; titrate supp O2 PRN to maintain SpO2 >94%; aspiration precautions - HOB >30'; con't treatment for anaphylaxis - Duo-nebs q4; con't Decadron 4mg q8, Benadryl 50mg IVP q4; Pepcid 20mg IVP q12. CXR this AM. Closely monitor for airway compromise or respiratory distress. · ID - Afebrile, mild leukocytosis; No ABX at this time; trend temp and WBC curve  · CVS - HD stable; no pressors; Goal MAP >65; monitor HR with Epi drip; monitor for hypotension. Con't NS @ 75cc/hr. Echo (2014) EF 55%. · Heme/Onc - Daily CBC; monitor H/H & Plts. Monitor for s/s of active bleeding. Check C4 and Tryptase. · Metabolic - Daily BMP; monitor e-lytes; replace PRN  · Renal - Trend renal indices; Cr currently elevated; monitor I/O's and UOP.    · Endocrine - Glycemic control goal <180; frequent glucose checks  · Neuro/ Pain/ Sedation - Avoid sedation; no active issues  · GI - Keep NPO for now; Pepcid  · Prophylaxis - DVT (SQH), GI (Pepcid)     Subjective/History: This patient has been seen and evaluated at the request of Dr. Agueda Riojas for Acute Anaphylaxis / Angioedema. Patient is a 74 y.o. male, with PMH of HTN, just started on Lisinopril about 2 months ago; GERD, Hiatal Hernia, Morbid Obesity, CTS, Arthritis, and Mesothelioma with Pneumonectomy (1999) who presented to [facility] ED on 07/15/18 c/o SOB, anaphalyaxis with facial swelling and hives. Pt's story is difficult to access as it changes every time he tells it, but it sounds like the hives began on Friday (07/13) sometime; pt applied Calomine lotion to hives on his back and the hives went away per pt. Then, Saturday at some point, after eating some \"green grape and oatmeal\" at some point throughout the day, the pt began to develop facial swelling and the hives returned. This time however, the pt also developed airway swelling and SOB with wheezing. Pt thus came to the ED. UPon arrival to ED, pt had audible wheezes, his L eye was nearly swollen shut and he had a muffled voice, per RN and ER Doc. ED w/u consisted of Benadryl, Pepcid, Steroids, IM Epi x2, Racemic Epi x1, duo-neb and an Epi drip for a brief period. Pt will be transferred to ICU for further monitoring for airway compromise. Upon iInitial evaluation, pt is sitting upright in bed, AOx3, breathing comfortably on RA and able to speak in full sentences. Pt states he feels much better than he did when he first arrived to the ED. Facial swelling appears much improved; no audible wheezing noted; pt is able to swallow without difficulty; denies any SOB/FARIDEH, cough, tongue swelling, CP, palpitations, abd pain, N/V/D, H/A, F/C, dysuria.      Past Medical History:   Diagnosis Date    Arthritis     CTS (carpal tunnel syndrome)     DJD (degenerative joint disease) of knee     Bilateral; endstage degenerative arthritis of right knee    Gastric ulcer, unspecified as acute or chronic, without mention of hemorrhage or perforation     patient listed stomach problems or ulcers    Hypertension     Knee pain     Lumbar pain     Medial meniscus tear May 2011    Right knee      Past Surgical History:   Procedure Laterality Date    HX HEENT      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    HX KNEE ARTHROSCOPY  5/26/2011    Right knee with partial medial meniscectomy of right knee    HX ORTHOPAEDIC      left thumb joint     HX TONSILLECTOMY      [place] RMVL LUNG OTHER THAN PNEUMONECTOMY 1 LOBE LOBECT      right for cancer in 2001 - no chemo       Prior to Admission medications    Medication Sig Start Date End Date Taking? Authorizing Provider   DIPHENHYDRAMINE HCL (BENADRYL ALLERGY PO) Take  by mouth. Yes Historical Provider   terazosin (HYTRIN) 10 mg capsule Take 10 mg by mouth nightly. Historical Provider   mometasone-formoterol (DULERA) 200-5 mcg/actuation HFA inhaler Take 2 Puffs by inhalation two (2) times a day. Historical Provider   tiZANidine (ZANAFLEX) 4 mg tablet Take 2-3 Tabs by mouth three (3) times daily as needed (spasm). Dose change 8/29/16   LINO Roy   IBUPROFEN (ADVIL LIQUI-GEL PO) Take  by mouth. Historical Provider   promethazine (PHENERGAN) 25 mg tablet Take 1 Tab by mouth every six (6) hours as needed for Nausea. 1/1/13   Myra Brown MD   ergocalciferol (VITAMIN D) 50,000 unit capsule Take 50,000 Units by mouth as directed. 9/22/10   Historical Provider   buPROPion SR (WELLBUTRIN SR) 150 mg SR tablet Take  by mouth two (2) times a day. Historical Provider   fluocinoNIDE (LIDEX) 0.05 % topical cream Apply  to affected area two (2) times a day. Historical Provider   magnesium oxide (MAG-OX) 400 mg tablet Take 400 mg by mouth daily. Historical Provider   ACETAMINOPHEN/DP-HYDRAM HCL (ACETAMINOPHEN PM PO) Take  by mouth. Historical Provider   omeprazole (PRILOSEC) 20 mg capsule Take 20 mg by mouth two (2) times daily (with meals).  6/9/10 Historical Provider   albuterol sulfate (PROVENTIL;VENTOLIN) 2.5 mg/0.5 mL Nebu 2.5 mg by Nebulization route once as needed. 6/9/10   Historical Provider   sertraline (ZOLOFT) 100 mg tablet Take 100 mg by mouth daily. Historical Provider   triamcinolone acetonide (KENALOG) 0.1 % ointment Apply  to affected area two (2) times a day. use thin layer     Historical Provider     Current Facility-Administered Medications   Medication Dose Route Frequency    EPINEPHrine HCl (PF) (ADRENALIN) 1 mg/mL (1 mL) injection        EPINEPHrine (ADRENALIN) 4 mg in 5% dextrose 250 mL infusion (PRE-MIX)  1-10 mcg/min IntraVENous TITRATE    albuterol-ipratropium (DUO-NEB) 2.5 mg-0.5 mg/3 ml nebulizer solution        albuterol-ipratropium (DUO-NEB) 2.5 MG-0.5 MG/3 ML  3 mL Nebulization Q4H RT    dexamethasone (DECADRON) 4 mg/mL injection 4 mg  4 mg IntraVENous Q8H    diphenhydrAMINE (BENADRYL) injection 50 mg  50 mg IntraVENous Q4H    heparin (porcine) injection 5,000 Units  5,000 Units SubCUTAneous Q8H    famotidine (PF) (PEPCID) 20 mg in sodium chloride 0.9% 10 mL injection  20 mg IntraVENous Q12H     Allergies   Allergen Reactions    Aspirin Other (comments)     Stomach bleed    Benazepril Angioedema    Metoprolol Other (comments)     Sexual dysfunction    Pcn [Penicillins] Rash      Social History   Substance Use Topics    Smoking status: Never Smoker    Smokeless tobacco: Not on file    Alcohol use No      Family History   Problem Relation Age of Onset    Hypertension Father     Asthma Father     Arthritis-osteo Father         Review of Systems:  Pertinent items are noted in HPI.     Objective:   Vital Signs:    Visit Vitals    /62    Pulse 69    Temp 98.1 °F (36.7 °C)    Resp 13    Ht 6' 3\" (1.905 m)    Wt 124.7 kg (275 lb)    SpO2 93%    BMI 34.37 kg/m2       O2 Device: Room air       Temp (24hrs), Av.9 °F (36.6 °C), Min:97.6 °F (36.4 °C), Max:98.1 °F (36.7 °C)       Intake/Output:   Last shift:      07/14 1901 - 07/15 0700  In: 306   Out: -   Last 3 shifts:      Intake/Output Summary (Last 24 hours) at 07/15/18 0649  Last data filed at 07/15/18 0330   Gross per 24 hour   Intake              306 ml   Output                0 ml   Net              306 ml       Physical Exam:    General:  Alert, cooperative, NAD, appears stated age. Head:  Normocephalic, without obvious abnormality, atraumatic. Eyes:  Conjunctivae/corneas clear. PERRL, EOMs intact. Nose: Nares normal. Septum midline. Mucosa normal. No drainage or sinus tenderness. Throat: Lips, mucosa, and tongue normal. Teeth and gums normal.   Neck: Supple, symmetrical, trachea midline, No JVD. Lungs:   Symmetrical chest rise; good AE bilat; end expiratory wheeze on posterior lung fields; no rhonchi/rales noted. Heart:  RRR, S1, S2 normal, no m/r/g   Abdomen:   Soft, non-tender. Bowel sounds normal. No masses,  No organomegaly. Extremities: Extremities normal, atraumatic, no cyanosis or edema. Pulses: 2+ and symmetric all extremities. Skin: Skin color, texture, turgor normal. No rashes or lesions   Neurologic: Grossly nonfocal       Data:     Recent Results (from the past 24 hour(s))   CBC WITH AUTOMATED DIFF    Collection Time: 07/15/18 12:05 AM   Result Value Ref Range    WBC 13.7 (H) 4.6 - 13.2 K/uL    RBC 4.11 (L) 4.70 - 5.50 M/uL    HGB 12.4 (L) 13.0 - 16.0 g/dL    HCT 36.4 36.0 - 48.0 %    MCV 88.6 74.0 - 97.0 FL    MCH 30.2 24.0 - 34.0 PG    MCHC 34.1 31.0 - 37.0 g/dL    RDW 14.4 11.6 - 14.5 %    PLATELET 461 748 - 326 K/uL    MPV 10.4 9.2 - 11.8 FL    NEUTROPHILS 72 40 - 73 %    LYMPHOCYTES 15 (L) 21 - 52 %    MONOCYTES 11 (H) 3 - 10 %    EOSINOPHILS 2 0 - 5 %    BASOPHILS 0 0 - 2 %    ABS. NEUTROPHILS 10.0 (H) 1.8 - 8.0 K/UL    ABS. LYMPHOCYTES 2.0 0.9 - 3.6 K/UL    ABS. MONOCYTES 1.5 (H) 0.05 - 1.2 K/UL    ABS. EOSINOPHILS 0.3 0.0 - 0.4 K/UL    ABS.  BASOPHILS 0.0 0.0 - 0.1 K/UL    DF AUTOMATED     PROTHROMBIN TIME + INR Collection Time: 07/15/18 12:05 AM   Result Value Ref Range    Prothrombin time 13.8 11.5 - 15.2 sec    INR 1.1 0.8 - 1.2     METABOLIC PANEL, COMPREHENSIVE    Collection Time: 07/15/18 12:05 AM   Result Value Ref Range    Sodium 142 136 - 145 mmol/L    Potassium 4.3 3.5 - 5.5 mmol/L    Chloride 105 100 - 108 mmol/L    CO2 28 21 - 32 mmol/L    Anion gap 9 3.0 - 18 mmol/L    Glucose 96 74 - 99 mg/dL    BUN 21 (H) 7.0 - 18 MG/DL    Creatinine 1.38 (H) 0.6 - 1.3 MG/DL    BUN/Creatinine ratio 15 12 - 20      GFR est AA >60 >60 ml/min/1.73m2    GFR est non-AA 52 (L) >60 ml/min/1.73m2    Calcium 8.2 (L) 8.5 - 10.1 MG/DL    Bilirubin, total 0.4 0.2 - 1.0 MG/DL    ALT (SGPT) 23 16 - 61 U/L    AST (SGOT) 18 15 - 37 U/L    Alk.  phosphatase 69 45 - 117 U/L    Protein, total 6.8 6.4 - 8.2 g/dL    Albumin 3.6 3.4 - 5.0 g/dL    Globulin 3.2 2.0 - 4.0 g/dL    A-G Ratio 1.1 0.8 - 1.7     MAGNESIUM    Collection Time: 07/15/18 12:05 AM   Result Value Ref Range    Magnesium 2.6 1.6 - 2.6 mg/dL   CARDIAC PANEL,(CK, CKMB & TROPONIN)    Collection Time: 07/15/18 12:05 AM   Result Value Ref Range     39 - 308 U/L    CK - MB 3.0 <3.6 ng/ml    CK-MB Index 1.2 0.0 - 4.0 %    Troponin-I, Qt. <0.02 0.0 - 0.045 NG/ML   TSH 3RD GENERATION    Collection Time: 07/15/18 12:05 AM   Result Value Ref Range    TSH 1.53 0.36 - 3.74 uIU/mL   TYPE & SCREEN    Collection Time: 07/15/18  1:00 AM   Result Value Ref Range    Crossmatch Expiration 07/18/2018     ABO/Rh(D) A POSITIVE     Antibody screen NEG    FFP, ALLOCATE    Collection Time: 07/15/18  1:45 AM   Result Value Ref Range    CALLED TO: KATHARINE CARLOS CASTREJON AT 0225 ON 07/15/2018 BY [facility]     Unit number [id number]     Blood component type FP 24h, Thaw     Unit division 00     Status of unit ISSUED     Unit number [id number]     Blood component type FP 24h, Thaw     Unit division 00     Status of unit ISSUED    EKG, 12 LEAD, INITIAL    Collection Time: 07/15/18  2:13 AM   Result Value Ref Range    Ventricular Rate 69 BPM    Atrial Rate 69 BPM    P-R Interval 182 ms    QRS Duration 102 ms    Q-T Interval 416 ms    QTC Calculation (Bezet) 445 ms    Calculated P Axis 45 degrees    Calculated R Axis -24 degrees    Calculated T Axis 33 degrees    Diagnosis       Normal sinus rhythm  Normal ECG  When compared with ECG of 20-JUN-2014 09:36,  No significant change was found     URINALYSIS W/ RFLX MICROSCOPIC    Collection Time: 07/15/18  2:45 AM   Result Value Ref Range    Color YELLOW      Appearance CLOUDY      Specific gravity 1.015 1.005 - 1.030      pH (UA) 6.0 5.0 - 8.0      Protein NEGATIVE  NEG mg/dL    Glucose NEGATIVE  NEG mg/dL    Ketone NEGATIVE  NEG mg/dL    Bilirubin NEGATIVE  NEG      Blood NEGATIVE  NEG      Urobilinogen 0.2 0.2 - 1.0 EU/dL    Nitrites NEGATIVE  NEG      Leukocyte Esterase NEGATIVE  NEG               Telemetry:normal sinus rhythm    Imaging:  CT NECK SOFT TISSUE [07/15/18]: IMPRESSION: Mild left asymmetrical mucosal thickening/edema at the level of the  epiglottis. No foreign body, airway widely patent    CXR [07/18/18]:   Read pending    I have personally reviewed the patients radiographs and have reviewed the reports:  AMD        Total critical care time exclusive of procedures: 60 minutes    Stephanie Coppola PA-C

## 2018-07-15 NOTE — PROGRESS NOTES
conducted an initial consultation and Spiritual Assessment for Sonya Castillo, who is a 74 y. o.,male. According to the patients chart Spiritism Affiliation is: Gnosticism. The reason the Patient came to the hospital is:   Patient Active Problem List    Diagnosis Date Noted    Angio-edema 07/15/2018    ACE inhibitor-aggravated angioedema 07/15/2018    Angioedema 07/15/2018    Chronic pain of both knees 11/29/2016    Myalgia 06/11/2015    Osteoarthrosis involving lower leg 06/11/2015    Ischemia of digits of hand 06/20/2014    Severe claudication ([facility] [address] 06/20/2014    Thoracic or lumbosacral neuritis or radiculitis, unspecified 04/04/2013    Lumbar disc displacement without myelopathy 11/12/2012    Lumbosacral spondylosis without myelopathy 11/12/2012    Low back pain 10/24/2011    Neck pain 10/24/2011    Knee pain 10/24/2011    Depression 10/24/2011    Hiatal hernia 10/24/2011    GERD (gastroesophageal reflux disease) 10/24/2011    Encounter for long-term (current) use of other medications 10/24/2011    Anxiety 10/24/2011    DDD (degenerative disc disease) 10/24/2011    DJD (degenerative joint disease) 10/24/2011    Chronic pain syndrome 10/24/2011    Carpal tunnel syndrome     Knee pain     HTN (hypertension) 06/09/2010    GERD (gastroesophageal reflux disease) 06/09/2010    Depression 06/09/2010    Hiatal hernia 06/09/2010        The  provided the following Interventions:  Initiated a relationship of care and support. Explored issues of maria luisa, belief, spirituality and Sikh/ritual needs while hospitalized. Listened empathically. Provided information about Spiritual Care Services. Offered prayer and assurance of continued prayers on patients behalf. Chart reviewed. The following outcomes were achieved:  Patient shared limited information about their medical narrative, spiritual journey and beliefs.    confirmed Patient's Spiritism Affiliation. Patient processed feelings about current hospitalization. Patient expressed gratitude for Spiritual Care visit. Assessment:  There are no significant spiritual or Latter day issues which require further intervention at this time. Patient does not have any Latter day or cultural needs that will affect patients preferences in health care. Plan:  Chaplains will continue to follow and will provide pastoral care as needed or requested.  recommends bedside caregivers page  on duty if patient shows signs of acute spiritual or emotional distress. [address] [place] Phillip Gee M.Div.   [address] [place] [address] Drive  [phone] - Office

## 2018-07-15 NOTE — H&P
Admission History and Physical 
[address] [facility] Patient: Emmanuel Mcnamara MRN: [id number]  CSN: [id number] YOB: 1950  Age: 74 y.o. Sex: male DOA: 7/15/2018 HPI:  
 
Emmanuel Mcnamara is a 74 y.o. male with PMH HTN, depression, dermatitis, COPD, BPH, now presenting with complaint of facial swelling, throat tightening and hives. Mr Radha Masterson provided the history and stated that starting yesterday he initially noticed he developed some hives on his body and was taking anti-histamines for this with some relief but then last night while watching television he noticed that his face, lips and eyelids began to swell and progressively got worse. He stated that he came to the ER after he felt his throat tightening up, and started to become hoarse. ED Course: He received Epinephrine injections (x2), dexamethasone, benadryl IV, racemic epinephrine nebulizer, epinephrine gtt, famotidine, transfused 2 units of FFP, Duo-neb, CT Neck that showed thickening at the level of the epiglottis, CXR and XR of the neck, cardiac panel, CBC, CMP, Mag, PT/INR, TSH, UA Review of Systems Constitutional: Negative for fever, chills and diaphoresis. HENT: Negative for hearing loss and sore throat. Positive for tightening of the throat and dysphagia. Eyes: Negative for blurred vision and double vision. Positive for eyelid swelling. Respiratory: Negative for cough and hemoptysis. Cardiovascular: Negative for chest pain and palpitations. Gastrointestinal: Negative for nausea and vomiting. Genitourinary: Negative for dysuria and hematuria. Musculoskeletal: Negative for myalgias and joint pain. Skin: Negative for itching and rash. Positive for hives Neurological: Negative for dizziness and headaches. Psychiatric: Negative for depression and suicidal ideas. Past Medical History:  
Diagnosis Date  Arthritis  CTS (carpal tunnel syndrome)  DJD (degenerative joint disease) of knee Bilateral; endstage degenerative arthritis of right knee  Gastric ulcer, unspecified as acute or chronic, without mention of hemorrhage or perforation   
 patient listed stomach problems or ulcers  Hypertension  Knee pain  Lumbar pain  Medial meniscus tear May 2011 Right knee Past Surgical History:  
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    
 left thumb joint  HX TONSILLECTOMY  [place] RMVL LUNG OTHER THAN PNEUMONECTOMY 1 LOBE LOBECT    
 right for cancer in  - no chemo Family History Problem Relation Age of Onset  Hypertension Father  Asthma Father  Arthritis-osteo Father Social History Social History  Marital status:  Spouse name: N/A  
 Number of children: N/A  
 Years of education: N/A Social History Main Topics  Smoking status: Never Smoker  Smokeless tobacco: Not on file  Alcohol use No  
 Drug use: No  
 Sexual activity: Not on file Other Topics Concern  Not on file Social History Narrative Allergies Allergen Reactions  Aspirin Other (comments) Stomach bleed  Metoprolol Other (comments) Sexual dysfunction  Pcn [Penicillins] Rash Prior to Admission Medications Prescriptions Last Dose Informant Patient Reported? Taking? ACETAMINOPHEN/DP-HYDRAM HCL (ACETAMINOPHEN PM PO) Unknown at Unknown time  Yes No  
Sig: Take  by mouth. DIPHENHYDRAMINE HCL (BENADRYL ALLERGY PO) 7/15/2018 at Unknown time  Yes Yes Sig: Take  by mouth. IBUPROFEN (ADVIL LIQUI-GEL PO) Unknown at Unknown time  Yes No  
Sig: Take  by mouth. albuterol sulfate (PROVENTIL;VENTOLIN) 2.5 mg/0.5 mL Nebu Unknown at Unknown time  Yes No  
Si.5 mg by Nebulization route once as needed. amLODIPine-benazepril (LOTREL) 5-20 mg per capsule 7/14/2018 at Unknown time  Yes Yes  
buPROPion SR (WELLBUTRIN SR) 150 mg SR tablet Unknown at Unknown time  Yes No  
Sig: Take  by mouth two (2) times a day. ergocalciferol (VITAMIN D) 50,000 unit capsule Unknown at Unknown time  Yes No  
Sig: Take 50,000 Units by mouth as directed. fluocinoNIDE (LIDEX) 0.05 % topical cream Unknown at Unknown time  Yes No  
Sig: Apply  to affected area two (2) times a day. magnesium oxide (MAG-OX) 400 mg tablet Unknown at Unknown time  Yes No  
Sig: Take 400 mg by mouth daily. mometasone-formoterol (DULERA) 200-5 mcg/actuation HFA inhaler Unknown at Unknown time  Yes No  
Sig: Take 2 Puffs by inhalation two (2) times a day. omeprazole (PRILOSEC) 20 mg capsule Unknown at Unknown time  Yes No  
Sig: Take 20 mg by mouth two (2) times daily (with meals). promethazine (PHENERGAN) 25 mg tablet Unknown at Unknown time  No No  
Sig: Take 1 Tab by mouth every six (6) hours as needed for Nausea. sertraline (ZOLOFT) 100 mg tablet Unknown at Unknown time  Yes No  
Sig: Take 100 mg by mouth daily. terazosin (HYTRIN) 10 mg capsule Unknown at Unknown time  Yes No  
Sig: Take 10 mg by mouth nightly. tiZANidine (ZANAFLEX) 4 mg tablet Unknown at Unknown time  No No  
Sig: Take 2-3 Tabs by mouth three (3) times daily as needed (spasm). Dose change  
triamcinolone acetonide (KENALOG) 0.1 % ointment Not Taking at Unknown time  Yes No  
Sig: Apply  to affected area two (2) times a day. use thin layer Facility-Administered Medications: None Physical Exam:  
 
Patient Vitals for the past 24 hrs: 
 Temp Pulse Resp BP SpO2  
07/15/18 0330 97.6 °F (36.4 °C) 71 11 146/70 97 % 07/15/18 0315 - 79 21 132/59 98 % 07/15/18 0245 - 69 15 131/62 93 % 07/15/18 0215 - 72 14 128/64 96 % 07/15/18 0200 - 66 15 116/71 96 % 07/15/18 0145 - 65 13 118/66 95 % 07/15/18 0130 - 69 20 121/63 95 % 07/15/18 0115 - 70 15 132/57 94 % 07/15/18 0100 - 66 13 122/65 98 % 07/15/18 0045 - 62 14 115/58 96 % 07/15/18 0030 - 65 14 95/51 96 % 07/15/18 0015 - 66 21 111/70 97 % 07/15/18 0004 97.9 °F (36.6 °C) 68 19 124/56 97 % Physical Exam:  
General:  Alert and Responsive and in No acute distress. HEENT: Conjunctiva pink, sclera anicteric. EOMI. Pharynx moist, nonerythematous. Moist mucous membranes. non-tender palpable nodule on the R-side anterior over the area of the thyroid . No supraclavicular, occipital or submandibular lymphadenopathy. Increased L-sided upper eyelid swelling. Difficulty with swallowing CV:  RRR. No murmurs, rubs, or gallops appreciated. No visible pulsations or thrills. RESP:  Unlabored breathing. Lungs with wheezes bilaterally especially in the upper airways. ABD:  Soft, nontender, nondistended. Normoactive bowel sounds. No hepatosplenomegaly. No suprapubic tenderness. RECTAL:  Deferred MS:  No joint deformity or instability. No atrophy. Neuro:  5/5 strength bilateral upper extremities and lower extremities. A+Ox3. Ext:  No edema. 2+ radial and dp pulses bilaterally. Skin:  No rashes, lesions, or ulcers. Good turgor. IMAGING:  
CT Results  (Last 48 hours) 07/15/18 0241  CT NECK SOFT TISSUE W CONT Final result Impression:  IMPRESSION: Mild left asymmetrical mucosal thickening/edema at the level of the  
epiglottis. No foreign body, airway widely patent. Narrative:  CT soft tissue neck with contrast  
   
HISTORY: Allergic reaction not complaining of neck swelling. Evaluate for mass COMPARISON: None TECHNIQUE: Thin section imaging of the neck from the skull base to the lung  
apices performed following the uneventful administration of IV contrast. CT dose  
reduction was achieved through use of a standardized protocol tailored for this  
examination and automatic exposure control for dose modulation. Ethelene Gauss FINDINGS:  
Brain parenchyma: Visualized portions of the brain are negative.   
Soft tissues: Negative Lymph nodes: Multiple bilateral small anterior, posterior cervical and submental  
lymph nodes. Airway: Airway is widely patent. There is 5 mm of left-sided mucosal  
thickening/swelling, at the level of the epiglottis. Foreign bodies: No foreign bodies. Lungs: Upper lung fields are clear. Recent Results (from the past 12 hour(s)) CBC WITH AUTOMATED DIFF Collection Time: 07/15/18 12:05 AM  
Result Value Ref Range WBC 13.7 (H) 4.6 - 13.2 K/uL  
 RBC 4.11 (L) 4.70 - 5.50 M/uL  
 HGB 12.4 (L) 13.0 - 16.0 g/dL HCT 36.4 36.0 - 48.0 % MCV 88.6 74.0 - 97.0 FL  
 MCH 30.2 24.0 - 34.0 PG  
 MCHC 34.1 31.0 - 37.0 g/dL  
 RDW 14.4 11.6 - 14.5 % PLATELET 880 647 - 515 K/uL MPV 10.4 9.2 - 11.8 FL  
 NEUTROPHILS 72 40 - 73 % LYMPHOCYTES 15 (L) 21 - 52 % MONOCYTES 11 (H) 3 - 10 % EOSINOPHILS 2 0 - 5 % BASOPHILS 0 0 - 2 %  
 ABS. NEUTROPHILS 10.0 (H) 1.8 - 8.0 K/UL  
 ABS. LYMPHOCYTES 2.0 0.9 - 3.6 K/UL  
 ABS. MONOCYTES 1.5 (H) 0.05 - 1.2 K/UL  
 ABS. EOSINOPHILS 0.3 0.0 - 0.4 K/UL  
 ABS. BASOPHILS 0.0 0.0 - 0.1 K/UL  
 DF AUTOMATED PROTHROMBIN TIME + INR Collection Time: 07/15/18 12:05 AM  
Result Value Ref Range Prothrombin time 13.8 11.5 - 15.2 sec INR 1.1 0.8 - 1.2 METABOLIC PANEL, COMPREHENSIVE Collection Time: 07/15/18 12:05 AM  
Result Value Ref Range Sodium 142 136 - 145 mmol/L Potassium 4.3 3.5 - 5.5 mmol/L Chloride 105 100 - 108 mmol/L  
 CO2 28 21 - 32 mmol/L Anion gap 9 3.0 - 18 mmol/L Glucose 96 74 - 99 mg/dL BUN 21 (H) 7.0 - 18 MG/DL Creatinine 1.38 (H) 0.6 - 1.3 MG/DL  
 BUN/Creatinine ratio 15 12 - 20 GFR est AA >60 >60 ml/min/1.73m2 GFR est non-AA 52 (L) >60 ml/min/1.73m2 Calcium 8.2 (L) 8.5 - 10.1 MG/DL Bilirubin, total 0.4 0.2 - 1.0 MG/DL  
 ALT (SGPT) 23 16 - 61 U/L  
 AST (SGOT) 18 15 - 37 U/L Alk. phosphatase 69 45 - 117 U/L Protein, total 6.8 6.4 - 8.2 g/dL Albumin 3.6 3.4 - 5.0 g/dL Globulin 3.2 2.0 - 4.0 g/dL A-G Ratio 1.1 0.8 - 1.7 MAGNESIUM Collection Time: 07/15/18 12:05 AM  
Result Value Ref Range Magnesium 2.6 1.6 - 2.6 mg/dL CARDIAC PANEL,(CK, CKMB & TROPONIN) Collection Time: 07/15/18 12:05 AM  
Result Value Ref Range  39 - 308 U/L  
 CK - MB 3.0 <3.6 ng/ml CK-MB Index 1.2 0.0 - 4.0 % Troponin-I, Qt. <0.02 0.0 - 0.045 NG/ML  
TSH 3RD GENERATION Collection Time: 07/15/18 12:05 AM  
Result Value Ref Range TSH 1.53 0.36 - 3.74 uIU/mL TYPE & SCREEN Collection Time: 07/15/18  1:00 AM  
Result Value Ref Range Crossmatch Expiration 07/18/2018 ABO/Rh(D) A POSITIVE Antibody screen NEG   
FFP, ALLOCATE Collection Time: 07/15/18  1:45 AM  
Result Value Ref Range CALLED TO: Brady Christinemarivel CASH AT 0225 ON 07/15/2018 BY GNS Unit number [id number] Blood component type FP 24h, Thaw Unit division 00 Status of unit ISSUED Unit number [id number] Blood component type FP 24h, Thaw Unit division 00 Status of unit ALLOCATED   
EKG, 12 LEAD, INITIAL Collection Time: 07/15/18  2:13 AM  
Result Value Ref Range Ventricular Rate 69 BPM  
 Atrial Rate 69 BPM  
 P-R Interval 182 ms QRS Duration 102 ms Q-T Interval 416 ms  
 QTC Calculation (Bezet) 445 ms Calculated P Axis 45 degrees Calculated R Axis -24 degrees Calculated T Axis 33 degrees Diagnosis Normal sinus rhythm Normal ECG When compared with ECG of 20-JUN-2014 09:36, No significant change was found URINALYSIS W/ RFLX MICROSCOPIC Collection Time: 07/15/18  2:45 AM  
Result Value Ref Range Color YELLOW Appearance CLOUDY Specific gravity 1.015 1.005 - 1.030    
 pH (UA) 6.0 5.0 - 8.0 Protein NEGATIVE  NEG mg/dL Glucose NEGATIVE  NEG mg/dL Ketone NEGATIVE  NEG mg/dL Bilirubin NEGATIVE  NEG Blood NEGATIVE  NEG Urobilinogen 0.2 0.2 - 1.0 EU/dL  Nitrites NEGATIVE  NEG Leukocyte Esterase NEGATIVE  NEG Assessment/Plan:  
74 y.o. male with PMH HTN, depression, dermatitis, COPD, BPH, h/o removed benign neck mass now admitted with angioedema likely 2/2 to ACE-I use. Angioedema likely 2/2 to ACE-I use - patient taking amlodipine-benazepril for HTN and last took medicine on 7/14. Has improved significantly since receiving treatment in the ER, but still with the sensation of swelling in his throat that is worsened with swallowing. No family history of angioedema 
-Admit to ICU for concern of respiratory compromise  
-Received 2 units of FFP, Epinephrine inj (x2), epinephrine gtt (stopped), Decadron, racemic Epi nebulizer in ED with moderate relief 
-Monitor closely for progression of swelling and recurrence -STOP ACE-inhibitor benazepril 
- CBC and BMP daily for now Neck mass - thyroid nodule vs lymph node. In the setting of previous neck masses concern for recurrence. Denies any history of thyroid disorders. 
- Neck/Thyroid U/S to evaluate mass - Further management will be based on US results Leukocytosis w/o left shift, fever, tachycardia, tachypnea. Received large dosage of steroids. Likely 2/2 to stress reaction  
-Will trend with daily CBC  
-Will monitor in the context of other signs/symptoms of infectious process HTN - currently stable - Stop Lotrel and all ACE-inhibitor drugs 
- Start amlodipine 10 mg 
- Consider adding thiazide-diuretic if not controlled on single agent COPD - with moderate obstruction. Currently wheezing but not complaining of SOB 
-Hold Dulera and use hospital formulary equivalent symbicort 
-Albuterol INH prn 
 
Depression - stable - Continue Wellbutrin BID 
-Continue zoloft daily BPH - PSA 2.4 (2017) -Hold terazosin for now OA/Joint pain/back spasms 
-Prescribed anti-inflammatory, gabapentin, tizanidine and patient unsure exactly what he is taking 
- Continue tizanidine and hold NSAID and gabapentin Diet: NPO except for sips of water DVT Prophylaxis: lovenox/SCDs Code Status: Full Point of Contact:  
[address] [place] [address] [phone] Disposition and anticipated LOS: >=2 midnights Linette Haynes DO PGY-3 
07/15/18 5:51 AM

## 2018-07-15 NOTE — ROUTINE PROCESS
TRANSFER - OUT REPORT:    Verbal report given to ANGELINA ZAZUETA [facility] RN(name) on Chris Andrews  being transferred to [facility](unit) for routine progression of care       Report consisted of patients Situation, Background, Assessment and   Recommendations(SBAR). Information from the following report(s) SBAR, Kardex, ED Summary, Intake/Output, MAR and Cardiac Rhythm . was reviewed with the receiving nurse. Lines:   Peripheral IV 07/15/18 Right Forearm (Active)   Site Assessment Clean, dry, & intact 7/15/2018  9:00 AM   Phlebitis Assessment 0 7/15/2018  9:00 AM   Infiltration Assessment 0 7/15/2018  9:00 AM   Dressing Status Clean, dry, & intact 7/15/2018  9:00 AM   Dressing Type Transparent 7/15/2018  9:00 AM   Hub Color/Line Status Green;Flushed;Patent 7/15/2018  9:00 AM   Action Taken Open ports on tubing capped 7/15/2018  9:00 AM   Alcohol Cap Used Yes 7/15/2018  9:00 AM       Peripheral IV 07/15/18 Right Antecubital (Active)   Site Assessment Clean, dry, & intact 7/15/2018  9:00 AM   Phlebitis Assessment 0 7/15/2018  9:00 AM   Infiltration Assessment 0 7/15/2018  9:00 AM   Dressing Status Clean, dry, & intact 7/15/2018  9:00 AM   Dressing Type Transparent 7/15/2018  9:00 AM   Hub Color/Line Status Patent;Green;Flushed 7/15/2018  9:00 AM   Action Taken Open ports on tubing capped 7/15/2018  9:00 AM   Alcohol Cap Used Yes 7/15/2018  9:00 AM        Opportunity for questions and clarification was provided. Patient transported with:   Tech    PT'S BELONGINGS THAT WERE LEFT WITH HIM TRANSPORTED TO ROOM 463 WITH PATIENT, INCLUDING CLOTHS, SHOES, SOCKS, CELL PHONE.

## 2018-07-15 NOTE — ED PROVIDER NOTES
HPI Comments: Pt presents to ed with a suspected allergic reaction. Pt states sudden onset of hives, itching and throat tightening with voice change. He has a hx of hypertension and is on benazeopril. No chest pain or sob reported Patient is a 74 y.o. male presenting with allergic reaction. The history is provided by the patient. No  was used. Allergic Reaction This is a new problem. The current episode started less than 1 hour ago. The problem occurs constantly. The problem has been gradually worsening. Pertinent negatives include no shortness of breath. Nothing aggravates the symptoms. Nothing relieves the symptoms. He has tried nothing for the symptoms. Past Medical History:  
Diagnosis Date  Arthritis  CTS (carpal tunnel syndrome)  DJD (degenerative joint disease) of knee Bilateral; endstage degenerative arthritis of right knee  Gastric ulcer, unspecified as acute or chronic, without mention of hemorrhage or perforation   
 patient listed stomach problems or ulcers  Hypertension  Knee pain  Lumbar pain  Medial meniscus tear May 2011 Right knee Past Surgical History:  
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    
 left thumb joint  HX TONSILLECTOMY  CO RMVL LUNG OTHER THAN PNEUMONECTOMY 1 LOBE LOBECT    
 right for cancer in 2001 - no chemo Family History:  
Problem Relation Age of Onset  Hypertension Father  Asthma Father  Arthritis-osteo Father Social History Social History  Marital status:  Spouse name: N/A  
 Number of children: N/A  
 Years of education: N/A Occupational History  Not on file. Social History Main Topics  Smoking status: Never Smoker  Smokeless tobacco: Not on file  Alcohol use No  
 Drug use:  No  Sexual activity: Not on file Other Topics Concern  Not on file Social History Narrative ALLERGIES: Aspirin; Metoprolol; and Pcn [penicillins] Review of Systems Constitutional: Negative for fever. HENT: Positive for facial swelling and voice change. Negative for trouble swallowing. Respiratory: Negative for chest tightness, shortness of breath and wheezing. Skin: Positive for rash. Neurological: Negative for dizziness. All other systems reviewed and are negative. Vitals:  
 07/15/18 0330 07/15/18 0345 07/15/18 0400 07/15/18 0272 BP: 146/70 118/60 122/59 123/66 Pulse: 71 76 69 71 Resp: 11 17 12 15 Temp: 97.6 °F (36.4 °C)   98.1 °F (36.7 °C) SpO2: 97% 95% 95% 95% Weight:      
Height:      
      
 
Physical Exam  
Constitutional: He is oriented to person, place, and time. He appears well-developed and well-nourished. HENT:  
Head: Normocephalic and atraumatic. Mouth/Throat: Uvula is midline, oropharynx is clear and moist and mucous membranes are normal. No uvula swelling. Eyes: Conjunctivae and EOM are normal. Pupils are equal, round, and reactive to light.  
 
 
+soft tissue swelling to left upper eyelid Neck: Normal range of motion. Neck supple. Suspected enlarged lymph node Cardiovascular: Normal rate and regular rhythm. Pulmonary/Chest: Effort normal. He has wheezes. Abdominal: Soft. Bowel sounds are normal.  
Musculoskeletal: Normal range of motion. Lymphadenopathy:  
  He has cervical adenopathy. Neurological: He is alert and oriented to person, place, and time. He has normal reflexes. Skin: Skin is warm and dry. Multiple patches of hive to trunk Psychiatric: He has a normal mood and affect. His behavior is normal. Judgment and thought content normal.  
Nursing note and vitals reviewed. MDM Number of Diagnoses or Management Options Angioedema, initial encounter:  
Angiotensin converting enzyme inhibitor-aggravated angioedema, initial encounter:  
Diagnosis management comments: Suspect ace related allergic reaction. Benadryl decadron pepcid and epinephrine ordered. After reviiewing medications pt on benazepril and I suspect oropharangeal angio edema. Pt continued to maintain his airway and an epinephrine drip was helping. Pt states he felt better. Duo neb and continuous saline neb was also helping pt. FFP was ordered for angioedema. Pt continues to maintain his airway and secretions. He was stable for ct and rn accompanied pt to ct scan for soft tissue neck ct. Pt continues to maintain his airway and secretions. I continue to suspect oropharyngeal angioedema, I will admit to ICU Amount and/or Complexity of Data Reviewed Clinical lab tests: ordered and reviewed Tests in the radiology section of CPT®: ordered and reviewed Tests in the medicine section of CPT®: ordered and reviewed Review and summarize past medical records: yes Discuss the patient with other providers: yes Independent visualization of images, tracings, or specimens: yes Risk of Complications, Morbidity, and/or Mortality Presenting problems: moderate Diagnostic procedures: moderate Management options: moderate Critical Care Total time providing critical care: < 30 minutes (I have personally provided _30__ minutes of critical care time exclusive of time spent on separately billable procedures. Time includes review of laboratory data, radiology results, discussion with consultants, and monitoring for potential decompensation. Interventions were performed as documented above ) Patient Progress Patient progress: stable ED Course Procedures Vitals: 
Patient Vitals for the past 12 hrs: 
 Temp Pulse Resp BP SpO2  
07/15/18 0415 98.1 °F (36.7 °C) 71 15 123/66 95 % 07/15/18 0400 - 69 12 122/59 95 % 07/15/18 0345 - 76 17 118/60 95 % 07/15/18 0330 97.6 °F (36.4 °C) 71 11 146/70 97 % 07/15/18 0315 - 79 21 132/59 98 % 07/15/18 0245 - 69 15 131/62 93 % 07/15/18 0215 - 72 14 128/64 96 % 07/15/18 0200 - 66 15 116/71 96 % 07/15/18 0145 - 65 13 118/66 95 % 07/15/18 0130 - 69 20 121/63 95 % 07/15/18 0115 - 70 15 132/57 94 % 07/15/18 0100 - 66 13 122/65 98 % 07/15/18 0045 - 62 14 115/58 96 % 07/15/18 0030 - 65 14 95/51 96 % 07/15/18 0015 - 66 21 111/70 97 % 07/15/18 0004 97.9 °F (36.6 °C) 68 19 124/56 97 % Medications ordered:  
Medications EPINEPHrine HCl (PF) (ADRENALIN) 1 mg/mL (1 mL) injection (  Canceled Entry 7/15/18 0007) EPINEPHrine (ADRENALIN) 4 mg in 5% dextrose 250 mL infusion (PRE-MIX) (0 mcg/min IntraVENous IV Completed 7/15/18 0355)  
0.9% sodium chloride infusion 250 mL (250 mL IntraVENous New Bag 7/15/18 0357)  
albuterol-ipratropium (DUO-NEB) 2.5 mg-0.5 mg/3 ml nebulizer solution (  Canceled Entry 7/15/18 0149) dexamethasone (DECADRON) 4 mg/mL injection 10 mg (10 mg IntraVENous Given 7/15/18 0014) diphenhydrAMINE (BENADRYL) injection 50 mg (50 mg IntraVENous Given 7/15/18 0005) famotidine (PF) (PEPCID) injection 20 mg (20 mg IntraVENous Given 7/15/18 0009) EPINEPHrine HCl (PF) (ADRENALIN) 1 mg/mL (1 mL) injection 0.2 mg (0.2 mg IntraMUSCular Given 7/15/18 0010)  
0.9% sodium chloride infusion (0 mL/hr IntraVENous IV Completed 7/15/18 0045) EPINEPHrine HCl (PF) (ADRENALIN) 1 mg/mL (1 mL) injection 0.3 mg (0.3 mg IntraMUSCular Given 7/15/18 0034) racEPINEPHrine (VAPONEFRIN) 2.25% nebulizer solution (0.42 mL Nebulization Given 7/15/18 0051)  
albuterol-ipratropium (DUO-NEB) 2.5 MG-0.5 MG/3 ML (3 mL Nebulization Given 7/15/18 0145) iopamidol (ISOVUE 300) 61 % contrast injection  mL (80 mL IntraVENous Given 7/15/18 0241) Lab findings: 
Recent Results (from the past 12 hour(s)) CBC WITH AUTOMATED DIFF Collection Time: 07/15/18 12:05 AM  
Result Value Ref Range  WBC 13.7 (H) 4.6 - 13.2 K/uL  
 RBC 4.11 (L) 4.70 - 5.50 M/uL  
 HGB 12.4 (L) 13.0 - 16.0 g/dL HCT 36.4 36.0 - 48.0 % MCV 88.6 74.0 - 97.0 FL  
 MCH 30.2 24.0 - 34.0 PG  
 MCHC 34.1 31.0 - 37.0 g/dL  
 RDW 14.4 11.6 - 14.5 % PLATELET 698 475 - 568 K/uL MPV 10.4 9.2 - 11.8 FL  
 NEUTROPHILS 72 40 - 73 % LYMPHOCYTES 15 (L) 21 - 52 % MONOCYTES 11 (H) 3 - 10 % EOSINOPHILS 2 0 - 5 % BASOPHILS 0 0 - 2 %  
 ABS. NEUTROPHILS 10.0 (H) 1.8 - 8.0 K/UL  
 ABS. LYMPHOCYTES 2.0 0.9 - 3.6 K/UL  
 ABS. MONOCYTES 1.5 (H) 0.05 - 1.2 K/UL  
 ABS. EOSINOPHILS 0.3 0.0 - 0.4 K/UL  
 ABS. BASOPHILS 0.0 0.0 - 0.1 K/UL  
 DF AUTOMATED PROTHROMBIN TIME + INR Collection Time: 07/15/18 12:05 AM  
Result Value Ref Range Prothrombin time 13.8 11.5 - 15.2 sec INR 1.1 0.8 - 1.2 METABOLIC PANEL, COMPREHENSIVE Collection Time: 07/15/18 12:05 AM  
Result Value Ref Range Sodium 142 136 - 145 mmol/L Potassium 4.3 3.5 - 5.5 mmol/L Chloride 105 100 - 108 mmol/L  
 CO2 28 21 - 32 mmol/L Anion gap 9 3.0 - 18 mmol/L Glucose 96 74 - 99 mg/dL BUN 21 (H) 7.0 - 18 MG/DL Creatinine 1.38 (H) 0.6 - 1.3 MG/DL  
 BUN/Creatinine ratio 15 12 - 20 GFR est AA >60 >60 ml/min/1.73m2 GFR est non-AA 52 (L) >60 ml/min/1.73m2 Calcium 8.2 (L) 8.5 - 10.1 MG/DL Bilirubin, total 0.4 0.2 - 1.0 MG/DL  
 ALT (SGPT) 23 16 - 61 U/L  
 AST (SGOT) 18 15 - 37 U/L Alk. phosphatase 69 45 - 117 U/L Protein, total 6.8 6.4 - 8.2 g/dL Albumin 3.6 3.4 - 5.0 g/dL Globulin 3.2 2.0 - 4.0 g/dL A-G Ratio 1.1 0.8 - 1.7 MAGNESIUM Collection Time: 07/15/18 12:05 AM  
Result Value Ref Range Magnesium 2.6 1.6 - 2.6 mg/dL CARDIAC PANEL,(CK, CKMB & TROPONIN) Collection Time: 07/15/18 12:05 AM  
Result Value Ref Range  39 - 308 U/L  
 CK - MB 3.0 <3.6 ng/ml CK-MB Index 1.2 0.0 - 4.0 % Troponin-I, Qt. <0.02 0.0 - 0.045 NG/ML  
TSH 3RD GENERATION Collection Time: 07/15/18 12:05 AM  
Result Value Ref Range TSH 1.53 0.36 - 3.74 uIU/mL TYPE & SCREEN  Collection Time: 07/15/18  1:00 AM  
Result Value Ref Range Crossmatch Expiration 07/18/2018 ABO/Rh(D) A POSITIVE Antibody screen NEG   
FFP, ALLOCATE Collection Time: 07/15/18  1:45 AM  
Result Value Ref Range CALLED TO: Elisa Colin EMERRM AT 0225 ON 07/15/2018 BY GNS Unit number [id number] Blood component type FP 24h, Thaw Unit division 00 Status of unit ISSUED Unit number [id number] Blood component type FP 24h, Thaw Unit division 00 Status of unit ISSUED   
EKG, 12 LEAD, INITIAL Collection Time: 07/15/18  2:13 AM  
Result Value Ref Range Ventricular Rate 69 BPM  
 Atrial Rate 69 BPM  
 P-R Interval 182 ms QRS Duration 102 ms Q-T Interval 416 ms  
 QTC Calculation (Bezet) 445 ms Calculated P Axis 45 degrees Calculated R Axis -24 degrees Calculated T Axis 33 degrees Diagnosis Normal sinus rhythm Normal ECG When compared with ECG of 20-JUN-2014 09:36, No significant change was found URINALYSIS W/ RFLX MICROSCOPIC Collection Time: 07/15/18  2:45 AM  
Result Value Ref Range Color YELLOW Appearance CLOUDY Specific gravity 1.015 1.005 - 1.030    
 pH (UA) 6.0 5.0 - 8.0 Protein NEGATIVE  NEG mg/dL Glucose NEGATIVE  NEG mg/dL Ketone NEGATIVE  NEG mg/dL Bilirubin NEGATIVE  NEG Blood NEGATIVE  NEG Urobilinogen 0.2 0.2 - 1.0 EU/dL Nitrites NEGATIVE  NEG Leukocyte Esterase NEGATIVE  NEG    
 
 
 
EKG: 
 Normal sinus rhythm Normal ECG When compared with ECG of 20-JUN-2014 09:36, No significant change was found X-Ray, CT or other radiology findings or impressions: 
CT NECK SOFT TISSUE W CONT Final Result XR CHEST PORT    (Results Pending) XR NECK SOFT TISSUE    (Results Pending) CT soft tissue neck with contrast 
  
HISTORY: Allergic reaction not complaining of neck swelling.  Evaluate for mass 
  
COMPARISON: None 
  
TECHNIQUE: Thin section imaging of the neck from the skull base to the lung 
apices performed following the uneventful administration of IV contrast. CT dose 
reduction was achieved through use of a standardized protocol tailored for this 
examination and automatic exposure control for dose modulation. . 
  
FINDINGS: 
Brain parenchyma: Visualized portions of the brain are negative. Soft tissues: Negative Lymph nodes: Multiple bilateral small anterior, posterior cervical and submental 
lymph nodes. 
  
Airway: Airway is widely patent. There is 5 mm of left-sided mucosal 
thickening/swelling, at the level of the epiglottis. 
  
Foreign bodies: No foreign bodies. 
  
Lungs: Upper lung fields are clear. 
  
IMPRESSION IMPRESSION: Mild left asymmetrical mucosal thickening/edema at the level of the 
epiglottis. No foreign body, airway widely patent. Progress notes, Consult notes or additional Procedure notes:  
 
Consult with Dr. Hazel Albarran PFM attending, he accepted admission for ICU Consult with Dr. Nakul Walker PFM resident, he accepted admission for ICU Reevaluation of patient:  
0015  Discussed case with Dr. Sharyn Lerma, he is in to evaluate pt 
 
 0045 pt with continued feeling of swelling in throat. Soft tissue neck xray ordered, racemic epi neb and epi drip ordered. 0130 pts states swelling slightly decreased,  Suspect angio edema of oropharnyx,  Discussed FFP administration with pt and he agrees with administration. [id number]  Order placed for ct of neck. Pt states a hx of surgery to remove mass in his neck, soft tissue neck xray shows a suspicious area in oropharynx. 0240  Pt returned from ct, pt tolerated it well. Pt states he feels much better and he signed his consent for blood product 0  Pt continues to maintain his own airway. +coughing occasionally to attempt to get mucous up from back of throat. Disposition: 
 
Diagnosis: 1. Angioedema, initial encounter 2.  Angiotensin converting enzyme inhibitor-aggravated angioedema, initial encounter Disposition: admitted to ICU Patient's Medications Start Taking No medications on file Continue Taking ACETAMINOPHEN/DP-HYDRAM HCL (ACETAMINOPHEN PM PO)    Take  by mouth. ALBUTEROL SULFATE (PROVENTIL;VENTOLIN) 2.5 MG/0.5 ML NEBU    2.5 mg by Nebulization route once as needed. AMLODIPINE-BENAZEPRIL (LOTREL) 5-20 MG PER CAPSULE      
 BUPROPION SR (WELLBUTRIN SR) 150 MG SR TABLET    Take  by mouth two (2) times a day. CLINDAMYCIN (CLEOCIN) 300 MG CAPSULE    Take 300 mg by mouth three (3) times daily. DIPHENHYDRAMINE HCL (BENADRYL ALLERGY PO)    Take  by mouth. ERGOCALCIFEROL (VITAMIN D) 50,000 UNIT CAPSULE    Take 50,000 Units by mouth as directed. FLUOCINONIDE (LIDEX) 0.05 % TOPICAL CREAM    Apply  to affected area two (2) times a day. IBUPROFEN (ADVIL LIQUI-GEL PO)    Take  by mouth. LORAZEPAM (ATIVAN) 0.5 MG TABLET    Take 1 Tab by mouth every six (6) hours. Max Daily Amount: 2 mg. MAGNESIUM OXIDE (MAG-OX) 400 MG TABLET    Take 400 mg by mouth daily. OMEPRAZOLE (PRILOSEC) 20 MG CAPSULE    Take 20 mg by mouth two (2) times daily (with meals). OTHER    Antibiotic mouthwash PROMETHAZINE (PHENERGAN) 25 MG TABLET    Take 1 Tab by mouth every six (6) hours as needed for Nausea. SERTRALINE (ZOLOFT) 100 MG TABLET    Take 100 mg by mouth daily. TIZANIDINE (ZANAFLEX) 4 MG TABLET    Take 2-3 Tabs by mouth three (3) times daily as needed (spasm). Dose change TRIAMCINOLONE ACETONIDE (KENALOG) 0.1 % OINTMENT    Apply  to affected area two (2) times a day. use thin layer WARFARIN (COUMADIN) 5 MG TABLET    Take 5 mg by mouth daily. These Medications have changed No medications on file Stop Taking No medications on file ADMISSION NOTE: 
4:29 AM 
Patient is being admitted to the hospital by Dr. Hany Wheeler. The results of their tests and reasons for their admission have been discussed with them and/or available family.  They convey agreement and understanding for the need to be admitted and for their admission diagnosis.    
  
 
Declan Cortez ENP-C,FNP-C

## 2018-07-15 NOTE — PROGRESS NOTES
Bedside shift change report given to Michelle PANTOJA (oncoming nurse) by Renuka Nichols (offgoing nurse). Report included the following information SBAR.

## 2018-07-15 NOTE — IP AVS SNAPSHOT
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 
[phone] Discharge Orders None A check harvey indicates which time of day the medication should be taken. My Medications START taking these medications Instructions Each Dose to Equal  
 Morning Noon Evening Bedtime  
 amLODIPine 10 mg tablet Commonly known as:  [place] Start taking on:  7/17/2018 Your last dose was: Your next dose is: Take 1 Tab by mouth daily. 10 mg  
    
   
   
   
  
 predniSONE 20 mg tablet Commonly known as:  Pratima Arbour Start taking on:  7/17/2018 Your last dose was: Your next dose is: Take 2 Tabs by mouth daily (with breakfast) for 3 days. 40 mg CONTINUE taking these medications Instructions Each Dose to Equal  
 Morning Noon Evening Bedtime ACETAMINOPHEN PM PO Your last dose was: Your next dose is: Take  by mouth. ADVIL LIQUI-GEL PO Your last dose was: Your next dose is: Take  by mouth. albuterol sulfate 2.5 mg/0.5 mL Nebu nebulizer solution Commonly known as:  PROVENTIL;VENTOLIN Your last dose was: Your next dose is:    
   
   
 2.5 mg by Nebulization route once as needed.   
 2.5 mg  
    
   
   
   
  
 BENADRYL ALLERGY PO Your last dose was: Your next dose is: Take  by mouth. buPROPion  mg SR tablet Commonly known as:  Ghada Bigness Your last dose was: Your next dose is: Take  by mouth two (2) times a day. DULERA 200-5 mcg/actuation HFA inhaler Generic drug:  mometasone-formoterol Your last dose was: Your next dose is: Take 2 Puffs by inhalation two (2) times a day. 2 Puff  
    
   
   
   
  
 fluocinoNIDE 0.05 % topical cream  
Commonly known as:  LIDEX Your last dose was: Your next dose is:    
   
   
 Apply  to affected area two (2) times a day. magnesium oxide 400 mg tablet Commonly known as:  MAG-OX Your last dose was: Your next dose is: Take 400 mg by mouth daily. 400 mg PriLOSEC 20 mg capsule Generic drug:  omeprazole Your last dose was: Your next dose is: Take 20 mg by mouth two (2) times daily (with meals). 20 mg  
    
   
   
   
  
 promethazine 25 mg tablet Commonly known as:  PHENERGAN Your last dose was: Your next dose is: Take 1 Tab by mouth every six (6) hours as needed for Nausea. 25 mg  
    
   
   
   
  
 terazosin 10 mg capsule Commonly known as:  HYTRIN Your last dose was: Your next dose is: Take 10 mg by mouth nightly. 10 mg  
    
   
   
   
  
 tiZANidine 4 mg tablet Commonly known as:  Alexi Wagoner Your last dose was: Your next dose is: Take 2-3 Tabs by mouth three (3) times daily as needed (spasm). Dose change 8-12 mg  
    
   
   
   
  
 triamcinolone acetonide 0.1 % ointment Commonly known as:  KENALOG Your last dose was: Your next dose is: Apply  to affected area two (2) times a day. use thin layer VITAMIN D2 50,000 unit capsule Generic drug:  ergocalciferol Your last dose was: Your next dose is: Take 50,000 Units by mouth as directed. 49292 Units ZOLOFT 100 mg tablet Generic drug:  sertraline Your last dose was: Your next dose is: Take 100 mg by mouth daily. 100 mg  
    
   
   
   
  
  
STOP taking these medications   
 amLODIPine-benazepril 5-20 mg per capsule Commonly known as:  Julien Ban Where to Get Your Medications Information on where to get these meds will be given to you by the nurse or doctor. ! Ask your nurse or doctor about these medications  
  amLODIPine 10 mg tablet  
 predniSONE 20 mg tablet Discharge Instructions Patient armband removed and given to patient to take home. Patient was informed of the privacy risks if armband lost or stolen [address] Activation Thank you for requesting access to [address]. Please follow the instructions below to securely access and download your online medical record. [address] allows you to send messages to your doctor, view your test results, renew your prescriptions, schedule appointments, and more. How Do I Sign Up? 1. In your internet browser, go to [email or web address] 
2. Click on the First Time User? Click Here link in the Sign In box. You will be redirect to the New Member Sign Up page. 3. Enter your [address] Access Code exactly as it appears below. You will not need to use this code after youve completed the sign-up process. If you do not sign up before the expiration date, you must request a new code. [address] Access Code: [email or web address] Expires: 10/13/2018 12:06 AM (This is the date your [address] access code will ) 4.  Enter the last four digits of your Social Security Number (xxxx) and Date of Birth (mm/dd/yyyy) as indicated and click Submit. You will be taken to the next sign-up page. 5. Create a ActionBase ID. This will be your ActionBase login ID and cannot be changed, so think of one that is secure and easy to remember. 6. Create a ActionBase password. You can change your password at any time. 7. Enter your Password Reset Question and Answer. This can be used at a later time if you forget your password. 8. Enter your e-mail address. You will receive e-mail notification when new information is available in [address] 9. Click Sign Up. You can now view and download portions of your medical record. 10. Click the Download Summary menu link to download a portable copy of your medical information. Additional Information If you have questions, please visit the Frequently Asked Questions section of the ActionBase website at [email or web address] Remember, [facility]Base is NOT to be used for urgent needs. For medical emergencies, dial 911. DISCHARGE SUMMARY from Nurse PATIENT INSTRUCTIONS: 
 
 
F-face looks uneven A-arms unable to move or move unevenly S-speech slurred or non-existent T-time-call 911 as soon as signs and symptoms begin-DO NOT go Back to bed or wait to see if you get better-TIME IS BRAIN. Warning Signs of HEART ATTACK Call 911 if you have these symptoms: 
? Chest discomfort. Most heart attacks involve discomfort in the center of the chest that lasts more than a few minutes, or that goes away and comes back. It can feel like uncomfortable pressure, squeezing, fullness, or pain. ? Discomfort in other areas of the upper body. Symptoms can include pain or discomfort in one or both arms, the back, neck, jaw, or stomach. ? Shortness of breath with or without chest discomfort. ? Other signs may include breaking out in a cold sweat, nausea, or lightheadedness. Don't wait more than five minutes to call [address]! Fast action can save your life. Calling 911 is almost always the fastest way to get lifesaving treatment. Emergency Medical Services staff can begin treatment when they arrive  up to an hour sooner than if someone gets to the hospital by car. The discharge information has been reviewed with the patient. The patient verbalized understanding. Discharge medications reviewed with the patient and appropriate educational materials and side effects teaching were provided. ___________________________________________________________________________________________________________________________________ Angioedema: Care Instructions Your Care Instructions Angioedema is an allergic reaction. It causes swelling and welts in the deep layers of the skin. Angioedema can sometimes occur along with hives. Hives are an allergic reaction in the outer layers of the skin. Angioedema can range from mild to severe. Painful welts can develop on the face. Angioedema can also occur on other parts of the body. In severe cases, the inside of the throat can swell and make it hard to breathe. Many things can cause this condition, including foods, insect bites, and medicines (such as aspirin and some blood pressure medicines). It also can run in families. Sometimes you may know what caused the reaction, but other times you may not know. Follow-up care is a key part of your treatment and safety. Be sure to make and go to all appointments, and call your doctor if you are having problems. It's also a good idea to know your test results and keep a list of the medicines you take. How can you care for yourself at home? · Take your medicines exactly as prescribed.  Call your doctor if you think you are having a problem with your medicine. You will get more details on the specific medicines your doctor prescribes. Some medicines used to treat angioedema can make you too sleepy to drive safely. Do not drive if you take medicine that may make you sleepy. · Avoid foods or medicine that may have triggered the swelling. · For comfort: ¨ Try taking a cool bath. Or place a cool, wet towel on the swollen area. ¨ Avoid hot baths and showers. ¨ Wear loose clothing. · Your doctor may prescribe a shot of epinephrine to carry with you in case you have a severe reaction. Learn how to give yourself the shot and keep it with you at all times. Make sure it has not . When should you call for help? Give an epinephrine shot if: 
  · You think you are having a severe allergic reaction.  
 After giving an epinephrine shot call 911, even if you feel better. 
 Call 911 if: 
  · You have symptoms of a severe allergic reaction. These may include: 
¨ Sudden raised, red areas (hives) all over your body. ¨ Swelling of the throat, mouth, lips, or tongue. ¨ Trouble breathing. ¨ Passing out (losing consciousness). Or you may feel very lightheaded or suddenly feel weak, confused, or restless.  
  · You have been given an epinephrine shot, even if you feel better.  
 Call your doctor now or seek immediate medical care if: 
  · You have symptoms of an allergic reaction, such as: ¨ A rash or hives (raised, red areas on the skin). ¨ Itching. ¨ Swelling. ¨ Belly pain, nausea, or vomiting.  
 Watch closely for changes in your health, and be sure to contact your doctor if: 
  · You do not get better as expected. Where can you learn more? Go to http://ravi-salvador.info/. Enter K857 in the search box to learn more about \"Angioedema: Care Instructions. \" Current as of: 2017 Content Version: 11.7 © 4378-0113 [employer].  Care instructions adapted under license by [address] (which disclaims liability or warranty for this information). If you have questions about a medical condition or this instruction, always ask your healthcare professional. [address] any warranty or liability for your use of this information. ACO Transitions of Care Introducing [employer] [address] [place] offers a voluntary care coordination program to provide high quality service and care to [place] fee-for-service beneficiaries. Lily Backer was designed to help you enhance your health and well-being through the following services: ? Transitions of Care  support for individuals who are transitioning from one care setting to another (example: Hospital to home). ? Chronic and Complex Care Coordination  support for individuals and caregivers of those with serious or chronic illnesses or with more than one chronic (ongoing) condition and those who take a number of different medications. If you meet specific medical criteria, a [facility] [address] may call you directly to coordinate your care with your primary care physician and your other care providers. For questions about the [facility] programs, please, contact your physicians office. For general questions or additional information about Accountable Care Organizations: 
Please visit www.medicare.gov/acos. html or call 1-800-MEDICARE (8-523.529.9375) TTY users should call 4-452.806.5273. Introducing [facility]! [employer] introduces "[employer]" patient portal. Now you can access parts of your medical record, email your doctor's office, and request medication refills online. 1. In your internet browser, go to [email or web address] 2. Click on the First Time User? Click Here link in the Sign In box. You will see the New Member Sign Up page. 3. Enter your MEPS Real-Time Access Code exactly as it appears below. You will not need to use this code after youve completed the sign-up process. If you do not sign up before the expiration date, you must request a new code. · MEPS Real-Time Access Code: [email or web address] Expires: 10/13/2018 12:06 AM 
 
4. Enter the last four digits of your Social Security Number (xxxx) and Date of Birth (mm/dd/yyyy) as indicated and click Submit. You will be taken to the next sign-up page. 5. Create a Canvacet ID. This will be your MEPS Real-Time login ID and cannot be changed, so think of one that is secure and easy to remember. 6. Create a MEPS Real-Time password. You can change your password at any time. 7. Enter your Password Reset Question and Answer. This can be used at a later time if you forget your password. 8. Enter your e-mail address. You will receive e-mail notification when new information is available in [address] 9. Click Sign Up. You can now view and download portions of your medical record. 10. Click the Download Summary menu link to download a portable copy of your medical information. If you have questions, please visit the Frequently Asked Questions section of the MEPS Real-Time website. Remember, MEPS Real-Time is NOT to be used for urgent needs. For medical emergencies, dial 911. Now available from your iPhone and Android! Introducing Francisco Vasquez As a [facility] patient, I wanted to make you aware of our electronic visit tool called Francisco Vasquez. [facility] 24/7 allows you to connect within minutes with a medical provider 24 hours a day, seven days a week via a mobile device or tablet or logging into a secure website from your computer. You can access Francisco Vasquez from anywhere in the [place].  
 
A virtual visit might be right for you when you have a simple condition and feel like you just dont want to get out of bed, or cant get away from work for an appointment, when your regular [employer] provider is not available (evenings, weekends or holidays), or when youre out of town and need minor care. Electronic visits cost only $49 and if the [employer] 24/7 provider determines a prescription is needed to treat your condition, one can be electronically transmitted to a nearby pharmacy*. Please take a moment to enroll today if you have not already done so. The enrollment process is free and takes just a few minutes. To enroll, please download the [employer] 24/7 kristen to your tablet or phone, or visit [email or web address] to enroll on your computer. And, as an [address] [place] [address] patient with a [employer]Method account, the results of your visits will be scanned into your electronic medical record and your primary care provider will be able to view the scanned results. We urge you to continue to see your regular [employer] provider for your ongoing medical care. And while your primary care provider may not be the one available when you seek a Tripbirdsxochitlfin virtual visit, the peace of mind you get from getting a real diagnosis real time can be priceless. For more information on General Specific, view our Frequently Asked Questions (FAQs) at [email or web address]. Sincerely, 
 
Chantelle Hill MD 
Chief Medical Officer [facility] *:  certain medications cannot be prescribed via General Specific Unresulted Labs-Please follow up with your PCP about these lab tests Order Current Status COMPLEMENT, C4 In process TRYPTASE In process US THYROID/PARATHYROID/SOFT TISS In process Providers Seen During Your Hospitalization Provider Specialty Primary office phone Marybeth Fabian MD Emergency Medicine [phone] Edy Colon MD Family Practice [phone] Milagro Vega MD Family Practice [phone] Your Primary Care Physician (PCP) Primary Care Physician Office Phone Office Fax Etelvina Nathan [phone] You are allergic to the following Allergen Reactions Aspirin Other (comments) Stomach bleed Benazepril Angioedema Metoprolol Other (comments) Sexual dysfunction Pcn (Penicillins) Rash Recent Documentation Height Weight BMI Smoking Status 1.905 m 122.4 kg 33.73 kg/m2 Never Smoker Emergency Contacts Name Discharge Info Relation Home Work Mobile Charo Harp DISCHARGE CAREGIVER [3] Spouse [3] [phone] Patient Belongings The following personal items are in your possession at time of discharge: 
  Dental Appliances: None  Visual Aid: None      Home Medications: None   Jewelry: None  Clothing: Shirt, Pants, Belt, Undergarments, Socks, With patient    Other Valuables: Cell Phone, Filomena Bad, Personal electronic devices (comment), Lighter/matches, With patient  Personal Items Sent to Safe: none Please provide this summary of care documentation to your next provider. Signatures-by signing, you are acknowledging that this After Visit Summary has been reviewed with you and you have received a copy. Patient Signature:  ____________________________________________________________ Date:  ____________________________________________________________  
  
Lia Fishman Provider Signature:  ____________________________________________________________ Date:  ____________________________________________________________

## 2018-07-15 NOTE — Clinical Note
Status[de-identified] Inpatient [101] Type of Bed: Intensive Care [6] Inpatient Hospitalization Certified Necessary for the Following Reasons: 3. Patient receiving treatment that can only be provided in an inpatient setting (further clarification in H&P documentation) Admitting Diagnosis: ACE inhibitor-aggravated angioedema [[id number]] Admitting Diagnosis: Angio-edema [407476] Admitting Physician: Yohan Cordova [[id number]] Attending Physician: Yohan Cordova [[id number]] Estimated Length of Stay: 3-4 Midnights Discharge Plan[de-identified] Home with Office Follow-up

## 2018-07-15 NOTE — ED NOTES
I personally saw and examined the patient and chart was reviewed prior to disposition. I have reviewed and agree with the midlevel findings, including all diagnostic interpretations, and plans as written. I was present during the key portions of separately billed procedures. Edenilson Thurman MD    Patient with acute onset of throat swelling change in voice hives. Initial report that was this is acute onset however on my evaluation states that this was after eating possibly grape C started having this symptom now repeat evaluation reports that this was since last night he was having some fullness of his lip. Subsequently he reported that the rash was after mowing the wall and he developed hives throughout was using calamine lotion and Benadryl. Was involved in the case after intramuscular epinephrine was given, he has clinical anaphylaxis. Given a 2nd dose of intramuscular epinephrine, there was no resultant tachycardia and he had no resolution of his voice change. He was given racemic epinephrine is a question about the intramuscular epinephrine so started a epinephrine drip transiently. He had improved but had persistent sensation that there was something stuck in his throat possibly phlegm. There is a persistent concern about ACE inhibitor angioedema since he is on a Benzapril. He did consent to FFP since he had no full response to maximal medical therapy for anaphylaxis. He was given nebulizer treatments, had a CT of the neck that had no specific finding but there was thickening of the base of the tongue.   At a prior surgery in the neck but doubtful issue a Metastatic issue

## 2018-07-16 ENCOUNTER — APPOINTMENT (OUTPATIENT)
Dept: ULTRASOUND IMAGING | Age: 68
DRG: 916 | End: 2018-07-16
Attending: FAMILY MEDICINE
Payer: MEDICARE

## 2018-07-16 VITALS
DIASTOLIC BLOOD PRESSURE: 55 MMHG | BODY MASS INDEX: 33.55 KG/M2 | OXYGEN SATURATION: 100 % | RESPIRATION RATE: 19 BRPM | SYSTOLIC BLOOD PRESSURE: 109 MMHG | HEIGHT: 75 IN | TEMPERATURE: 97.8 F | HEART RATE: 68 BPM | WEIGHT: 269.84 LBS

## 2018-07-16 LAB
ANION GAP SERPL CALC-SCNC: 7 MMOL/L (ref 3–18)
ATRIAL RATE: 69 BPM
BASOPHILS # BLD: 0 K/UL (ref 0–0.1)
BASOPHILS NFR BLD: 0 % (ref 0–2)
BLD PROD TYP BPU: NORMAL
BLD PROD TYP BPU: NORMAL
BPU ID: NORMAL
BPU ID: NORMAL
BUN SERPL-MCNC: 20 MG/DL (ref 7–18)
BUN/CREAT SERPL: 19 (ref 12–20)
CALCIUM SERPL-MCNC: 8.5 MG/DL (ref 8.5–10.1)
CALCULATED P AXIS, ECG09: 45 DEGREES
CALCULATED R AXIS, ECG10: -24 DEGREES
CALCULATED T AXIS, ECG11: 33 DEGREES
CALLED TO:,BCALL1: NORMAL
CHLORIDE SERPL-SCNC: 106 MMOL/L (ref 100–108)
CO2 SERPL-SCNC: 27 MMOL/L (ref 21–32)
CREAT SERPL-MCNC: 1.05 MG/DL (ref 0.6–1.3)
DIAGNOSIS, 93000: NORMAL
DIFFERENTIAL METHOD BLD: ABNORMAL
EOSINOPHIL # BLD: 0 K/UL (ref 0–0.4)
EOSINOPHIL NFR BLD: 0 % (ref 0–5)
ERYTHROCYTE [DISTWIDTH] IN BLOOD BY AUTOMATED COUNT: 14.3 % (ref 11.6–14.5)
GLUCOSE SERPL-MCNC: 125 MG/DL (ref 74–99)
HCT VFR BLD AUTO: 37.2 % (ref 36–48)
HGB BLD-MCNC: 12.2 G/DL (ref 13–16)
LYMPHOCYTES # BLD: 1.3 K/UL (ref 0.9–3.6)
LYMPHOCYTES NFR BLD: 9 % (ref 21–52)
MCH RBC QN AUTO: 30.1 PG (ref 24–34)
MCHC RBC AUTO-ENTMCNC: 32.8 G/DL (ref 31–37)
MCV RBC AUTO: 91.9 FL (ref 74–97)
MONOCYTES # BLD: 1.2 K/UL (ref 0.05–1.2)
MONOCYTES NFR BLD: 8 % (ref 3–10)
NEUTS SEG # BLD: 11.9 K/UL (ref 1.8–8)
NEUTS SEG NFR BLD: 83 % (ref 40–73)
P-R INTERVAL, ECG05: 182 MS
PLATELET # BLD AUTO: 302 K/UL (ref 135–420)
PMV BLD AUTO: 10.5 FL (ref 9.2–11.8)
POTASSIUM SERPL-SCNC: 4.2 MMOL/L (ref 3.5–5.5)
Q-T INTERVAL, ECG07: 416 MS
QRS DURATION, ECG06: 102 MS
QTC CALCULATION (BEZET), ECG08: 445 MS
RBC # BLD AUTO: 4.05 M/UL (ref 4.7–5.5)
SODIUM SERPL-SCNC: 140 MMOL/L (ref 136–145)
STATUS OF UNIT,%ST: NORMAL
STATUS OF UNIT,%ST: NORMAL
UNIT DIVISION, %UDIV: 0
UNIT DIVISION, %UDIV: 0
VENTRICULAR RATE, ECG03: 69 BPM
WBC # BLD AUTO: 14.4 K/UL (ref 4.6–13.2)

## 2018-07-16 PROCEDURE — 74011636637 HC RX REV CODE- 636/637: Performed by: INTERNAL MEDICINE

## 2018-07-16 PROCEDURE — 74011250637 HC RX REV CODE- 250/637: Performed by: FAMILY MEDICINE

## 2018-07-16 PROCEDURE — 74011250636 HC RX REV CODE- 250/636: Performed by: PHYSICIAN ASSISTANT

## 2018-07-16 PROCEDURE — 80048 BASIC METABOLIC PNL TOTAL CA: CPT

## 2018-07-16 PROCEDURE — 74011000250 HC RX REV CODE- 250: Performed by: FAMILY MEDICINE

## 2018-07-16 PROCEDURE — 85025 COMPLETE CBC W/AUTO DIFF WBC: CPT

## 2018-07-16 PROCEDURE — 94640 AIRWAY INHALATION TREATMENT: CPT

## 2018-07-16 PROCEDURE — 36415 COLL VENOUS BLD VENIPUNCTURE: CPT

## 2018-07-16 PROCEDURE — 76536 US EXAM OF HEAD AND NECK: CPT

## 2018-07-16 RX ORDER — AMLODIPINE BESYLATE 10 MG/1
10 TABLET ORAL DAILY
Qty: 30 TAB | Refills: 0 | Status: SHIPPED | OUTPATIENT
Start: 2018-07-17

## 2018-07-16 RX ORDER — PREDNISONE 20 MG/1
40 TABLET ORAL
Qty: 6 TAB | Refills: 0 | Status: SHIPPED | OUTPATIENT
Start: 2018-07-17 | End: 2018-07-20

## 2018-07-16 RX ADMIN — DIPHENHYDRAMINE HYDROCHLORIDE 50 MG: 50 INJECTION, SOLUTION INTRAMUSCULAR; INTRAVENOUS at 02:30

## 2018-07-16 RX ADMIN — ALBUTEROL SULFATE 2.5 MG: 2.5 SOLUTION RESPIRATORY (INHALATION) at 07:43

## 2018-07-16 RX ADMIN — BUPROPION HYDROCHLORIDE 150 MG: 150 TABLET, EXTENDED RELEASE ORAL at 08:46

## 2018-07-16 RX ADMIN — SERTRALINE HYDROCHLORIDE 100 MG: 50 TABLET ORAL at 08:45

## 2018-07-16 RX ADMIN — AMLODIPINE BESYLATE 10 MG: 10 TABLET ORAL at 08:45

## 2018-07-16 RX ADMIN — DIPHENHYDRAMINE HYDROCHLORIDE 50 MG: 50 INJECTION, SOLUTION INTRAMUSCULAR; INTRAVENOUS at 11:22

## 2018-07-16 RX ADMIN — DIPHENHYDRAMINE HYDROCHLORIDE 50 MG: 50 INJECTION, SOLUTION INTRAMUSCULAR; INTRAVENOUS at 06:36

## 2018-07-16 RX ADMIN — PANTOPRAZOLE SODIUM 40 MG: 40 TABLET, DELAYED RELEASE ORAL at 08:46

## 2018-07-16 RX ADMIN — PREDNISONE 40 MG: 20 TABLET ORAL at 08:46

## 2018-07-16 RX ADMIN — ALBUTEROL SULFATE 2.5 MG: 2.5 SOLUTION RESPIRATORY (INHALATION) at 00:16

## 2018-07-16 RX ADMIN — TIZANIDINE 12 MG: 4 TABLET ORAL at 07:54

## 2018-07-16 RX ADMIN — BUDESONIDE AND FORMOTEROL FUMARATE DIHYDRATE 2 PUFF: 160; 4.5 AEROSOL RESPIRATORY (INHALATION) at 07:43

## 2018-07-16 NOTE — DISCHARGE INSTRUCTIONS
Patient armband removed and given to patient to take home. Patient was informed of the privacy risks if armband lost or stolen    MyChart Activation    Thank you for requesting access to [facility]. Please follow the instructions below to securely access and download your online medical record. [facility] allows you to send messages to your doctor, view your test results, renew your prescriptions, schedule appointments, and more. How Do I Sign Up? 1. In your internet browser, go to [email or web address]  2. Click on the First Time User? Click Here link in the Sign In box. You will be redirect to the New Member Sign Up page. 3. Enter your [facility] Access Code exactly as it appears below. You will not need to use this code after youve completed the sign-up process. If you do not sign up before the expiration date, you must request a new code. [facility] Access Code: [email or web address]  Expires: 10/13/2018 12:06 AM (This is the date your [facility] access code will )    4. Enter the last four digits of your Social Security Number (xxxx) and Date of Birth (mm/dd/yyyy) as indicated and click Submit. You will be taken to the next sign-up page. 5. Create a [facility] ID. This will be your [facility] login ID and cannot be changed, so think of one that is secure and easy to remember. 6. Create a [facility] password. You can change your password at any time. 7. Enter your Password Reset Question and Answer. This can be used at a later time if you forget your password. 8. Enter your e-mail address. You will receive e-mail notification when new information is available in [address]. 9. Click Sign Up. You can now view and download portions of your medical record. 10. Click the Download Summary menu link to download a portable copy of your medical information.     Additional Information    If you have questions, please visit the Frequently Asked Questions section of the [facility] website at [email or web address] Remember, MyChart is NOT to be used for urgent needs. For medical emergencies, dial 911. DISCHARGE SUMMARY from Nurse    PATIENT INSTRUCTIONS:    After general anesthesia or intravenous sedation, for 24 hours or while taking prescription Narcotics:  · Limit your activities  · Do not drive and operate hazardous machinery  · Do not make important personal or business decisions  · Do  not drink alcoholic beverages  · If you have not urinated within 8 hours after discharge, please contact your surgeon on call. Report the following to your surgeon:  · Excessive pain, swelling, redness or odor of or around the surgical area  · Temperature over 100.5  · Nausea and vomiting lasting longer than 4 hours or if unable to take medications  · Any signs of decreased circulation or nerve impairment to extremity: change in color, persistent  numbness, tingling, coldness or increase pain  · Any questions    What to do at Home:  Recommended activity: Activity as tolerated    If you experience any of the following symptoms Nausea,vomiting, diarrhea, chest pain, shortness of breath  , please follow up with PCP. *  Please give a list of your current medications to your Primary Care Provider. *  Please update this list whenever your medications are discontinued, doses are      changed, or new medications (including over-the-counter products) are added. *  Please carry medication information at all times in case of emergency situations. These are general instructions for a healthy lifestyle:    No smoking/ No tobacco products/ Avoid exposure to second hand smoke  Surgeon General's Warning:  Quitting smoking now greatly reduces serious risk to your health.     Obesity, smoking, and sedentary lifestyle greatly increases your risk for illness    A healthy diet, regular physical exercise & weight monitoring are important for maintaining a healthy lifestyle    You may be retaining fluid if you have a history of heart failure or if you experience any of the following symptoms:  Weight gain of 3 pounds or more overnight or 5 pounds in a week, increased swelling in our hands or feet or shortness of breath while lying flat in bed. Please call your doctor as soon as you notice any of these symptoms; do not wait until your next office visit. Recognize signs and symptoms of STROKE:    F-face looks uneven    A-arms unable to move or move unevenly    S-speech slurred or non-existent    T-time-call 911 as soon as signs and symptoms begin-DO NOT go       Back to bed or wait to see if you get better-TIME IS BRAIN. Warning Signs of HEART ATTACK     Call 911 if you have these symptoms:   Chest discomfort. Most heart attacks involve discomfort in the center of the chest that lasts more than a few minutes, or that goes away and comes back. It can feel like uncomfortable pressure, squeezing, fullness, or pain.  Discomfort in other areas of the upper body. Symptoms can include pain or discomfort in one or both arms, the back, neck, jaw, or stomach.  Shortness of breath with or without chest discomfort.  Other signs may include breaking out in a cold sweat, nausea, or lightheadedness. Don't wait more than five minutes to call 911 - MINUTES MATTER! Fast action can save your life. Calling 911 is almost always the fastest way to get lifesaving treatment. Emergency Medical Services staff can begin treatment when they arrive -- up to an hour sooner than if someone gets to the hospital by car. The discharge information has been reviewed with the patient. The patient verbalized understanding. Discharge medications reviewed with the patient and appropriate educational materials and side effects teaching were provided.   ___________________________________________________________________________________________________________________________________     Angioedema: Care Instructions  Your Care Instructions    Angioedema is an allergic reaction. It causes swelling and welts in the deep layers of the skin. Angioedema can sometimes occur along with hives. Hives are an allergic reaction in the outer layers of the skin. Angioedema can range from mild to severe. Painful welts can develop on the face. Angioedema can also occur on other parts of the body. In severe cases, the inside of the throat can swell and make it hard to breathe. Many things can cause this condition, including foods, insect bites, and medicines (such as aspirin and some blood pressure medicines). It also can run in families. Sometimes you may know what caused the reaction, but other times you may not know. Follow-up care is a key part of your treatment and safety. Be sure to make and go to all appointments, and call your doctor if you are having problems. It's also a good idea to know your test results and keep a list of the medicines you take. How can you care for yourself at home? · Take your medicines exactly as prescribed. Call your doctor if you think you are having a problem with your medicine. You will get more details on the specific medicines your doctor prescribes. Some medicines used to treat angioedema can make you too sleepy to drive safely. Do not drive if you take medicine that may make you sleepy. · Avoid foods or medicine that may have triggered the swelling. · For comfort:  ¨ Try taking a cool bath. Or place a cool, wet towel on the swollen area. ¨ Avoid hot baths and showers. ¨ Wear loose clothing. · Your doctor may prescribe a shot of epinephrine to carry with you in case you have a severe reaction. Learn how to give yourself the shot and keep it with you at all times. Make sure it has not . When should you call for help?   Give an epinephrine shot if:    · You think you are having a severe allergic reaction.    After giving an epinephrine shot call 911, even if you feel better.   Call 911 if:    · You have symptoms of a severe allergic reaction. These may include:  ¨ Sudden raised, red areas (hives) all over your body. ¨ Swelling of the throat, mouth, lips, or tongue. ¨ Trouble breathing. ¨ Passing out (losing consciousness). Or you may feel very lightheaded or suddenly feel weak, confused, or restless.     · You have been given an epinephrine shot, even if you feel better.    Call your doctor now or seek immediate medical care if:    · You have symptoms of an allergic reaction, such as:  ¨ A rash or hives (raised, red areas on the skin). ¨ Itching. ¨ Swelling. ¨ Belly pain, nausea, or vomiting.    Watch closely for changes in your health, and be sure to contact your doctor if:    · You do not get better as expected. Where can you learn more? Go to http://ravi-salvador.info/. Enter S409 in the search box to learn more about \"Angioedema: Care Instructions. \"  Current as of: October 6, 2017  Content Version: 11.7  © 3477-2169 [employer]. Care instructions adapted under license by [employer] (which disclaims liability or warranty for this information). If you have questions about a medical condition or this instruction, always ask your healthcare professional. [address] any warranty or liability for your use of this information.

## 2018-07-16 NOTE — ROUTINE PROCESS
Bedside shift change report given to  [address]  (oncoming nurse) by Dex Carrera RN (offgoing nurse). Report included the following information SBAR, Kardex, Intake/Output and Recent Results.  Eating his breakfast.

## 2018-07-16 NOTE — PROGRESS NOTES
Problem: Falls - Risk of  Goal: *Absence of Falls  Document Luis Enrique Fall Risk and appropriate interventions in the flowsheet.    Outcome: Progressing Towards Goal  Fall Risk Interventions:            Medication Interventions: Teach patient to arise slowly         History of Falls Interventions: Door open when patient unattended

## 2018-07-16 NOTE — PROGRESS NOTES
Problem: Falls - Risk of  Goal: *Absence of Falls  Document Luis Enrique Fall Risk and appropriate interventions in the flowsheet.    Outcome: Progressing Towards Goal  Fall Risk Interventions:            Medication Interventions: Evaluate medications/consider consulting pharmacy         History of Falls Interventions: Bed/chair exit alarm

## 2018-07-16 NOTE — PHYSICIAN ADVISORY
Letter of admission status determination     Benny Dela Cruz   Age: 74 y.o. MRN: [id number]  [facility]:  [id number]    Date of admission:  7/15/2018    I have reviewed this case as it involves a Medicare patient admitted as inpatient that has not been hospitalized for at least two midnights and has a discharge order placed. The patient did better than expected and is markedly improved on day 2. However, based on patient's presenting condition, it was reasonable for the admitting physician to expect the patient to require medically necessary hospital services for at least two midnights. Therefore, I agree with the attending physician's decision to admit Benny Dela Cruz as INPATIENT.       Alda Davies MD, [place], [address] [facility]  Physician [place].  [phone]    July 16, 2018   11:07 AM

## 2018-07-16 NOTE — ROUTINE PROCESS
Pt stated, \" I left my car in the ED parking lot, my wife doesn't drive so I will drive my self home after discharge. \" MD made aware.  Melvi Gray per MD.

## 2018-07-16 NOTE — PROGRESS NOTES
Care Management Interventions  PCP Verified by CM: Yes  Mode of Transport at Discharge: Other (see comment) (family)  Transition of Care Consult (CM Consult): Discharge Planning  Current Support Network: Lives with Spouse  Discharge Location  Discharge Placement: Home     Reason for Admission:   ACE inhibitor aggravated angioedema                RRAT Score:     10                        Plan for utilizing home health:     no    Likelihood of readmission?    Green/low            Transition of Care Plan:    Pt has been discharged home with family

## 2018-07-16 NOTE — PROGRESS NOTES
Intern Progress Note [address] Patient: Governor Marte MRN: [id number]  CSN: [id number] YOB: 1950  Age: 74 y.o. Sex: male DOA: 7/15/2018 LOS:  LOS: 1 day   PCP: Kathy Lubin MD  
             
Subjective:  
 
Acute events: no acute events overnight. Patient says the swelling has improved and he denies any SOB, hoarseness and has been eating and drinking without difficulty. He was asking about his thyroid scan that was ordered for him. Patient denies any history of thyroid conditions. Brief ROS: Negative for CP, SOB, abdominal pain. He is urinating and passing bowel movements without difficulty. Negative for hoarseness, dysphagia. Objective:  
  
Patient Vitals for the past 24 hrs: 
 Temp Pulse Resp BP SpO2  
07/16/18 0732 97.2 °F (36.2 °C) 60 18 132/66 96 % 07/16/18 0355 98 °F (36.7 °C) 64 18 139/83 93 % 07/15/18 2328 98.1 °F (36.7 °C) 69 18 139/79 97 % 07/15/18 2028 98.8 °F (37.1 °C) 67 18 170/70 90 % 07/15/18 1530 - 73 16 - 96 % 07/15/18 1430 - 72 14 139/73 97 % 07/15/18 1400 - 73 18 148/72 97 % 07/15/18 1330 - 73 21 148/70 95 % 07/15/18 1300 - 75 18 133/77 96 % 07/15/18 1230 - 85 20 157/70 96 % 07/15/18 1200 98.2 °F (36.8 °C) 82 19 154/74 96 % 07/15/18 1130 - 71 17 161/81 97 % 07/15/18 1110 98.2 °F (36.8 °C) - - - -  
07/15/18 1100 - 67 17 148/80 96 % 07/15/18 1030 - 68 14 162/82 97 % 07/15/18 1000 - 73 22 147/75 96 % 07/15/18 0930 - 73 18 137/73 97 % 07/15/18 0900 - 77 25 140/77 94 % 07/15/18 0800 98.7 °F (37.1 °C) 72 24 150/77 95 % Physical Exam: 
General: awake, alert and in NAD, patient has no difficulty conversing HEENT: no gross swelling appreciated, no neck masses palpable, no bruits or stridor on auscultation of neck Cardiovascular: RRR w/o MRGs Respiratory: CTAB no rales, rhonchi, wheezes, unlabored breathing Abdomen: Soft, distended, +BS, non-tendeder Extremities: no peripheral edema, 2+ posterior tibial pulses intact bilaterally Neuro: Cranial nerves grossly intact, grossly moving upper and lower extremities Skin: No urticaria appreciated. Negative for lesions, ulcers, rashes Lab/Data Reviewed: 
BMP:  
Lab Results Component Value Date/Time  07/16/2018 04:28 AM  
 K 4.2 07/16/2018 04:28 AM  
  07/16/2018 04:28 AM  
 CO2 27 07/16/2018 04:28 AM  
 AGAP 7 07/16/2018 04:28 AM  
  (H) 07/16/2018 04:28 AM  
 BUN 20 (H) 07/16/2018 04:28 AM  
 CREA 1.05 07/16/2018 04:28 AM  
 GFRAA >60 07/16/2018 04:28 AM  
 GFRNA >60 07/16/2018 04:28 AM  
 
CMP:  
Lab Results Component Value Date/Time  07/16/2018 04:28 AM  
 K 4.2 07/16/2018 04:28 AM  
  07/16/2018 04:28 AM  
 CO2 27 07/16/2018 04:28 AM  
 AGAP 7 07/16/2018 04:28 AM  
  (H) 07/16/2018 04:28 AM  
 BUN 20 (H) 07/16/2018 04:28 AM  
 CREA 1.05 07/16/2018 04:28 AM  
 GFRAA >60 07/16/2018 04:28 AM  
 GFRNA >60 07/16/2018 04:28 AM  
 CA 8.5 07/16/2018 04:28 AM  
 
CBC:  
Lab Results Component Value Date/Time WBC 14.4 (H) 07/16/2018 04:28 AM  
 HGB 12.2 (L) 07/16/2018 04:28 AM  
 HCT 37.2 07/16/2018 04:28 AM  
  07/16/2018 04:28 AM  
  
 
CBC w/Diff Recent Labs  
   07/16/18 
 0428  07/15/18 
 0005 WBC  14.4*  13.7*  
RBC  4.05*  4.11* HGB  12.2*  12.4*  
HCT  37.2  36.4 PLT  302  304 GRANS  83*  72 LYMPH  9*  15* EOS  0  2 Chemistry Recent Labs  
   07/16/18 
 0428  07/15/18 
 0005 GLU  125*  96 NA  140  142  
K  4.2  4.3 CL  106  105 CO2  27  28 BUN  20*  21* CREA  1.05  1.38* CA  8.5  8.2* MG   --   2.6 AGAP  7  9 BUCR  19  15 AP   --   69  
TP   --   6.8 ALB   --   3.6 GLOB   --   3.2 AGRAT   --   1.1 Microbiology No results for input(s): SDES, CULT in the last 72 hours. No results for input(s): CULT in the last 72 hours. I/O Intake/Output Summary (Last 24 hours) at 07/16/18 1782 Last data filed at 07/15/18 1530 Gross per 24 hour Intake 380 ml Output             1051 ml Net             -671 ml Scheduled Medications Reviewed: 
Current Facility-Administered Medications Medication Dose Route Frequency  albuterol CONCENTRATE 2.5mg/0.5 mL neb soln  2.5 mg Nebulization Q6H RT  
 buPROPion SR (WELLBUTRIN SR) tablet 150 mg  150 mg Oral BID  budesonide-formoterol (SYMBICORT) 160-4.5 mcg/actuation HFA inhaler 2 Puff  2 Puff Inhalation BID RT  
 pantoprazole (PROTONIX) tablet 40 mg  40 mg Oral ACB  sertraline (ZOLOFT) tablet 100 mg  100 mg Oral DAILY  amLODIPine (NORVASC) tablet 10 mg  10 mg Oral DAILY  enoxaparin (LOVENOX) injection 40 mg  40 mg SubCUTAneous Q24H  
 diphenhydrAMINE (BENADRYL) injection 50 mg  50 mg IntraVENous Q4H  predniSONE (DELTASONE) tablet 40 mg  40 mg Oral DAILY WITH BREAKFAST Imaging, EKG/Telemetry: CT neck soft tissue: (7/15/18) Mild left asymmetrical mucosal thickening/edema at the level of the epiglottis. No foreign body, airway widely patent. Assessment/Plan  
 
74 y.o. male with PMH HTN, depression, dermatitis, COPD, BPH, h/o removed benign neck mass now admitted with angioedema likely 2/2 to ACE-I use which has resolved.  
  
Angioedema likely 2/2 to ACE-I use - patient was taking amlodipine-benazepril for HTN and last took medicine on 7/14. Appears to have resolved since receiving treatment in the ER. Patient is not complaining of any SOB or hoarseness, vital signs have been stable. No family history of angioedema. Received 2 units of FFP, Epinephrine inj (x2), epinephrine gtt (stopped), Decadron, racemic Epi nebulizer in ED with moderate relief - Monitor closely for progression of swelling and recurrence 
- albuterol neb q6hr - Symbicort BID 
- benadryl 50 mg q4 - Prednisone 40 mg- plan taper for 10 days  
- STOP ACE-inhibitor benazepril - FU C4 levels 
- CBC and BMP daily for now 
  
Neck mass - no palpable masses on physical exam. thyroid nodule vs lymph node.  In the setting of previous neck masses concern for recurrence. Denies any history of thyroid disorders. 
- Neck/Thyroid U/S to evaluate mass - Further management will be based on US results 
  
Leukocytosis w/o left shift, fever, tachycardia, tachypnea. Received large dosage of steroids. Likely 2/2 to stress reaction. Patient HD stable now. -Will trend with daily CBC  
-Will monitor in the context of other signs/symptoms of infectious process 
  
HTN - currently stable - Stop Lotrel and all ACE-inhibitor drugs 
- Start amlodipine 10 mg 
- Consider adding thiazide-diuretic if not controlled on single agent 
  
COPD - Lungs sounded clear today b/l. O2 saturating of 93% on room air. He is not complaining of SOB, no horseness or stridor appreciated.  
-Hold Dulera and use hospital formulary equivalent symbicort 
-Albuterol INH prn 
  
Depression - stable - Continue Wellbutrin BID 
-Continue zoloft daily 
  
BPH - PSA 2.4 (2017) -Hold terazosin for now 
  
OA/Joint pain/back spasms 
-Prescribed anti-inflammatory, gabapentin, tizanidine and patient unsure exactly what he is taking 
- Continue tizanidine and hold NSAID and gabapentin 
  
  
Diet: Regular DVT Prophylaxis: lovenox/SCDs Code Status: Full Point of Contact:  
[address] [place] [address] [phone] Mary Grace La MD PGY-1 
7/16/2018, 7:44 AM

## 2018-07-17 NOTE — DISCHARGE SUMMARY
Discharge Summary  [facility]      Patient: Chris Andrews Age: 79 y.o. Sex: male  : 1950    MRN: [id number]      DOA: 7/15/2018      Discharge Date: 18       Attending: Dr. Cheo Martinez MD      PCP: Estela Trejo MD        ================================================================    Reason for Admission: ACE inhibitor-aggravated angioedema  Angio-edema  Angioedema    Discharge Diagnoses:   ACE inhibitor induced angioedema (resolved)   Neck mass (stable)   Leukocytosis (stable)   Hypertension (stable)  COPD (stable)   Depression (stable)   BPH (stable)   OA/Joint pain/back spasms (stable)     Important notes to PCP/ follow-up studies and evaluations   - No ACE-inhibitors/ARBs  - amlodipine 10 mg daily started   - on Prednisone 40 mg for 3 days  - Follow up for right thyroid nodule found on ultrasound   Pending labs and studies:  none  Operative Procedures:   none    Discharge Medications:     Discharge Medication List as of 2018 11:35 AM      START taking these medications    Details   amLODIPine (NORVASC) 10 mg tablet Take 1 Tab by mouth daily. , Print, Disp-30 Tab, R-0      predniSONE (DELTASONE) 20 mg tablet Take 2 Tabs by mouth daily (with breakfast) for 3 days. , Print, Disp-6 Tab, R-0         CONTINUE these medications which have NOT CHANGED    Details   DIPHENHYDRAMINE HCL (BENADRYL ALLERGY PO) Take  by mouth., Historical Med      buPROPion SR (WELLBUTRIN SR) 150 mg SR tablet Take  by mouth two (2) times a day. Historical Med      albuterol sulfate (PROVENTIL;VENTOLIN) 2.5 mg/0.5 mL Nebu 2.5 mg, Nebulization, 1 TIME PRN Starting 2010, Until Discontinued, Historical Med      terazosin (HYTRIN) 10 mg capsule Take 10 mg by mouth nightly., Historical Med      mometasone-formoterol (DULERA) 200-5 mcg/actuation HFA inhaler Take 2 Puffs by inhalation two (2) times a day., Historical Med      tiZANidine (ZANAFLEX) 4 mg tablet Take 2-3 Tabs by mouth three (3) times daily as needed (spasm). Dose change, Normal, Disp-720 Tab, R-5      IBUPROFEN (ADVIL LIQUI-GEL PO) Take  by mouth., Historical Med      promethazine (PHENERGAN) 25 mg tablet Take 1 Tab by mouth every six (6) hours as needed for Nausea. Print, 25 mg, Disp-25 Tab, R-0      ergocalciferol (VITAMIN D) 50,000 unit capsule Take 50,000 Units by mouth as directed. Historical Med, 50,000 Units      fluocinoNIDE (LIDEX) 0.05 % topical cream Apply  to affected area two (2) times a day. Historical Med      magnesium oxide (MAG-OX) 400 mg tablet Take 400 mg by mouth daily. Historical Med, 400 mg      ACETAMINOPHEN/DP-HYDRAM HCL (ACETAMINOPHEN PM PO) Take  by mouth. Historical Med      omeprazole (PRILOSEC) 20 mg capsule 20 mg, Oral, 2 TIMES DAILY WITH MEALS Starting 6/9/2010, Until Discontinued, Historical Med      sertraline (ZOLOFT) 100 mg tablet Take 100 mg by mouth daily. , Historical Med      triamcinolone acetonide (KENALOG) 0.1 % ointment use thin layer Topical, 2 TIMES DAILY, Until Discontinued, Historical Med         STOP taking these medications       amLODIPine-benazepril (LOTREL) 5-20 mg per capsule Comments:   Reason for Stopping:                 Disposition: Home    Consultants:    ICU, [address] [place] [address] Course (including pertinent history and physical findings)  74year old male with past medical history of hypertension, lung masses, depression, dermatitis, COPD, BPH, history of removed benign neck mass admitted with angioedema likely secondary to ACE-I use. ACE- inhibitor induced angioedema  Patient reports 1 day of hives on his body for which he took anti-histamines with some relief but while watching television he noticed his face, lips, and eyelids began to swell. This swelling became worse and he felt his throat tightening up and became hoarse.  In the emergency department he received two epinephrine injections, dexamethasone, IV benadryl, racemic epinephrine nebulizer, duo nebs, epinephrine drip, famotidine, and was transfused two units of FFP. A CT neck showed thickening at the level of the epiglottis. He was admitted to the ICU, pulmonology was consulted, the allergic reaction reversed with treatment and he was stable overnight. Angioedema completely resolved and patient was discharged on a 3 day course of prednisone with instructions to not take ACE- inhibitors/ARBs. Neck Mass  Patient has history of previous neck masses. He reports no history of thyroid dysfunction but does report a history of lung masses that were removed years ago. Thyroid ultrasound on this admission shows a right lobe nodule and recommends follow up. Leukocytosis   White count was elevated to 13.7 on admission. This was likely in the setting of administration of steroids and stress reaction. Patient remained afebrile and hemodynamically stable. Hypertension, COPD, Depression, BPH, OA/Joint pain/back spasms   Patient was managed for chronic conditions and his home medications were adjusted as appropriate. Notably, his ACE inhibitor was stopped and patient was instructed not to take this or ARBs anymore. Summarized key findings and results (labs, imaging studies, ECHO, cardiac cath, endoscopies, etc):    X-ray Neck soft tissue (7/15/18): Mild fullness of the epiglottis. At the time of this dictation the patient has undergone a CT of the neck, reference is made to that study for further details. If symptoms persist repeat imaging could be considered in 7-14 days. CT soft tissue neck w. contrast (7/15/18) Mild left asymmetrical mucosal thickening/edema at the level of the epiglottis. No foreign body, airway widely patent. Chest X ray (7/15/18): unchanged from 2012 study     Ultrasound Thyroid (7/16/18): Solid nodule along the right thyroid lobe corresponds to nodule seen on prior CT. Continued follow-up is recommended.     CBC w/Diff    Lab Results   Component Value Date/Time    WBC 14.4 (H) 07/16/2018 04:28 AM HGB 12.2 (L) 07/16/2018 04:28 AM    HCT 37.2 07/16/2018 04:28 AM    PLATELET 043 16/49/8750 04:28 AM    MCV 91.9 07/16/2018 04:28 AM           Chemistry    Lab Results   Component Value Date/Time    Sodium 140 07/16/2018 04:28 AM    Potassium 4.2 07/16/2018 04:28 AM    Chloride 106 07/16/2018 04:28 AM    CO2 27 07/16/2018 04:28 AM    Anion gap 7 07/16/2018 04:28 AM    Glucose 125 (H) 07/16/2018 04:28 AM    BUN 20 (H) 07/16/2018 04:28 AM    Creatinine 1.05 07/16/2018 04:28 AM    BUN/Creatinine ratio 19 07/16/2018 04:28 AM    GFR est AA >60 07/16/2018 04:28 AM    GFR est non-AA >60 07/16/2018 04:28 AM    Calcium 8.5 07/16/2018 04:28 AM           Functional status and cognitive function:    Ambulatory  Status: alert, cooperative, no distress, appears stated age    Diet: Regular     Code status and advanced care plan: Full  Point of Contact: Aaron Adhikari (spouse) [phone]    Patient Education:  Patient was educated on the following topics prior to discharge: Angioedema     Follow-up:   Follow-up Information     Follow up With Details Stephanie Diamond MD On 7/19/2018 @ 11:30 [id number] [address] [place] [address] Shawna George  [phone]              ================================================================  Trung Turner MD PGY-1  [facility]  7/17/2018, 6:56 AM

## 2018-07-19 LAB — C4 SERPL-MCNC: 25 MG/DL (ref 14–44)

## 2018-07-20 LAB — TRYPTASE SERPL-MCNC: 7 UG/L (ref 2.2–13.2)

## 2019-02-26 ENCOUNTER — APPOINTMENT (OUTPATIENT)
Dept: GENERAL RADIOLOGY | Age: 69
End: 2019-02-26
Attending: EMERGENCY MEDICINE
Payer: MEDICARE

## 2019-02-26 ENCOUNTER — APPOINTMENT (OUTPATIENT)
Dept: CT IMAGING | Age: 69
End: 2019-02-26
Attending: EMERGENCY MEDICINE
Payer: MEDICARE

## 2019-02-26 ENCOUNTER — HOSPITAL ENCOUNTER (EMERGENCY)
Age: 69
Discharge: HOME | End: 2019-02-26
Attending: EMERGENCY MEDICINE | Admitting: EMERGENCY MEDICINE
Payer: MEDICARE

## 2019-02-26 VITALS
WEIGHT: 271.6 LBS | HEART RATE: 69 BPM | BODY MASS INDEX: 33.95 KG/M2 | OXYGEN SATURATION: 99 % | TEMPERATURE: 98.7 F | SYSTOLIC BLOOD PRESSURE: 125 MMHG | RESPIRATION RATE: 14 BRPM | DIASTOLIC BLOOD PRESSURE: 93 MMHG

## 2019-02-26 DIAGNOSIS — F41.1 ANXIETY STATE: ICD-10-CM

## 2019-02-26 DIAGNOSIS — R11.2 NAUSEA AND VOMITING, INTRACTABILITY OF VOMITING NOT SPECIFIED, UNSPECIFIED VOMITING TYPE: Primary | ICD-10-CM

## 2019-02-26 DIAGNOSIS — K29.70 GASTRITIS WITHOUT BLEEDING, UNSPECIFIED CHRONICITY, UNSPECIFIED GASTRITIS TYPE: ICD-10-CM

## 2019-02-26 LAB
ALBUMIN SERPL-MCNC: 3.8 G/DL (ref 3.4–5)
ALBUMIN/GLOB SERPL: 1.2 {RATIO} (ref 0.8–1.7)
ALP SERPL-CCNC: 93 U/L (ref 45–117)
ALT SERPL-CCNC: 39 U/L (ref 16–61)
ANION GAP SERPL CALC-SCNC: 6 MMOL/L (ref 3–18)
APPEARANCE UR: ABNORMAL
AST SERPL-CCNC: 21 U/L (ref 15–37)
ATRIAL RATE: 66 BPM
BASOPHILS # BLD: 0 K/UL (ref 0–0.1)
BASOPHILS NFR BLD: 0 % (ref 0–2)
BILIRUB DIRECT SERPL-MCNC: 0.2 MG/DL (ref 0–0.2)
BILIRUB SERPL-MCNC: 0.6 MG/DL (ref 0.2–1)
BILIRUB UR QL: NEGATIVE
BUN SERPL-MCNC: 14 MG/DL (ref 7–18)
BUN/CREAT SERPL: 13 (ref 12–20)
CALCIUM SERPL-MCNC: 9 MG/DL (ref 8.5–10.1)
CALCULATED P AXIS, ECG09: 55 DEGREES
CALCULATED R AXIS, ECG10: -30 DEGREES
CALCULATED T AXIS, ECG11: 54 DEGREES
CHLORIDE SERPL-SCNC: 102 MMOL/L (ref 100–108)
CO2 SERPL-SCNC: 25 MMOL/L (ref 21–32)
COLOR UR: YELLOW
CREAT SERPL-MCNC: 1.1 MG/DL (ref 0.6–1.3)
DIAGNOSIS, 93000: NORMAL
DIFFERENTIAL METHOD BLD: ABNORMAL
EOSINOPHIL # BLD: 0.2 K/UL (ref 0–0.4)
EOSINOPHIL NFR BLD: 2 % (ref 0–5)
EPITH CASTS URNS QL MICRO: ABNORMAL /LPF (ref 0–5)
ERYTHROCYTE [DISTWIDTH] IN BLOOD BY AUTOMATED COUNT: 13.6 % (ref 11.6–14.5)
GLOBULIN SER CALC-MCNC: 3.3 G/DL (ref 2–4)
GLUCOSE SERPL-MCNC: 142 MG/DL (ref 74–99)
GLUCOSE UR STRIP.AUTO-MCNC: NEGATIVE MG/DL
HCT VFR BLD AUTO: 43.6 % (ref 36–48)
HGB BLD-MCNC: 15.3 G/DL (ref 13–16)
HGB UR QL STRIP: NEGATIVE
KETONES UR QL STRIP.AUTO: NEGATIVE MG/DL
LEUKOCYTE ESTERASE UR QL STRIP.AUTO: NEGATIVE
LIPASE SERPL-CCNC: 133 U/L (ref 73–393)
LYMPHOCYTES # BLD: 1.3 K/UL (ref 0.9–3.6)
LYMPHOCYTES NFR BLD: 14 % (ref 21–52)
MAGNESIUM SERPL-MCNC: 2.3 MG/DL (ref 1.6–2.6)
MCH RBC QN AUTO: 30.8 PG (ref 24–34)
MCHC RBC AUTO-ENTMCNC: 35.1 G/DL (ref 31–37)
MCV RBC AUTO: 87.9 FL (ref 74–97)
MONOCYTES # BLD: 0.9 K/UL (ref 0.05–1.2)
MONOCYTES NFR BLD: 10 % (ref 3–10)
MUCOUS THREADS URNS QL MICRO: ABNORMAL /LPF
NEUTS SEG # BLD: 7.1 K/UL (ref 1.8–8)
NEUTS SEG NFR BLD: 74 % (ref 40–73)
NITRITE UR QL STRIP.AUTO: NEGATIVE
P-R INTERVAL, ECG05: 184 MS
PH UR STRIP: >8.5 [PH] (ref 5–8)
PLATELET # BLD AUTO: 270 K/UL (ref 135–420)
PMV BLD AUTO: 10.5 FL (ref 9.2–11.8)
POTASSIUM SERPL-SCNC: 4.1 MMOL/L (ref 3.5–5.5)
PROT SERPL-MCNC: 7.1 G/DL (ref 6.4–8.2)
PROT UR STRIP-MCNC: ABNORMAL MG/DL
Q-T INTERVAL, ECG07: 422 MS
QRS DURATION, ECG06: 100 MS
QTC CALCULATION (BEZET), ECG08: 442 MS
RBC # BLD AUTO: 4.96 M/UL (ref 4.7–5.5)
RBC #/AREA URNS HPF: ABNORMAL /HPF (ref 0–5)
SODIUM SERPL-SCNC: 133 MMOL/L (ref 136–145)
SP GR UR REFRACTOMETRY: 1.02 (ref 1–1.03)
UROBILINOGEN UR QL STRIP.AUTO: 0.2 EU/DL (ref 0.2–1)
VENTRICULAR RATE, ECG03: 66 BPM
WBC # BLD AUTO: 9.5 K/UL (ref 4.6–13.2)
WBC URNS QL MICRO: ABNORMAL /HPF (ref 0–4)

## 2019-02-26 PROCEDURE — 74011250636 HC RX REV CODE- 250/636: Performed by: EMERGENCY MEDICINE

## 2019-02-26 PROCEDURE — 85025 COMPLETE CBC W/AUTO DIFF WBC: CPT

## 2019-02-26 PROCEDURE — 99285 EMERGENCY DEPT VISIT HI MDM: CPT

## 2019-02-26 PROCEDURE — 83735 ASSAY OF MAGNESIUM: CPT

## 2019-02-26 PROCEDURE — 96376 TX/PRO/DX INJ SAME DRUG ADON: CPT

## 2019-02-26 PROCEDURE — 83690 ASSAY OF LIPASE: CPT

## 2019-02-26 PROCEDURE — 74022 RADEX COMPL AQT ABD SERIES: CPT

## 2019-02-26 PROCEDURE — 96375 TX/PRO/DX INJ NEW DRUG ADDON: CPT

## 2019-02-26 PROCEDURE — 74011636320 HC RX REV CODE- 636/320: Performed by: EMERGENCY MEDICINE

## 2019-02-26 PROCEDURE — 74177 CT ABD & PELVIS W/CONTRAST: CPT

## 2019-02-26 PROCEDURE — C9113 INJ PANTOPRAZOLE SODIUM, VIA: HCPCS | Performed by: EMERGENCY MEDICINE

## 2019-02-26 PROCEDURE — 80048 BASIC METABOLIC PNL TOTAL CA: CPT

## 2019-02-26 PROCEDURE — 93005 ELECTROCARDIOGRAM TRACING: CPT

## 2019-02-26 PROCEDURE — 96361 HYDRATE IV INFUSION ADD-ON: CPT

## 2019-02-26 PROCEDURE — 96374 THER/PROPH/DIAG INJ IV PUSH: CPT

## 2019-02-26 PROCEDURE — 80076 HEPATIC FUNCTION PANEL: CPT

## 2019-02-26 PROCEDURE — 81001 URINALYSIS AUTO W/SCOPE: CPT

## 2019-02-26 RX ORDER — DIPHENHYDRAMINE HYDROCHLORIDE 50 MG/ML
12.5 INJECTION, SOLUTION INTRAMUSCULAR; INTRAVENOUS ONCE
Status: COMPLETED | OUTPATIENT
Start: 2019-02-26 | End: 2019-02-26

## 2019-02-26 RX ORDER — PANTOPRAZOLE SODIUM 40 MG/10ML
40 INJECTION, POWDER, LYOPHILIZED, FOR SOLUTION INTRAVENOUS
Status: COMPLETED | OUTPATIENT
Start: 2019-02-26 | End: 2019-02-26

## 2019-02-26 RX ORDER — PROCHLORPERAZINE EDISYLATE 5 MG/ML
5 INJECTION INTRAMUSCULAR; INTRAVENOUS
Status: COMPLETED | OUTPATIENT
Start: 2019-02-26 | End: 2019-02-26

## 2019-02-26 RX ORDER — ONDANSETRON 2 MG/ML
4 INJECTION INTRAMUSCULAR; INTRAVENOUS
Status: COMPLETED | OUTPATIENT
Start: 2019-02-26 | End: 2019-02-26

## 2019-02-26 RX ORDER — DIPHENHYDRAMINE HYDROCHLORIDE 50 MG/ML
25 INJECTION, SOLUTION INTRAMUSCULAR; INTRAVENOUS ONCE
Status: COMPLETED | OUTPATIENT
Start: 2019-02-26 | End: 2019-02-26

## 2019-02-26 RX ORDER — SODIUM CHLORIDE 9 MG/ML
125 INJECTION, SOLUTION INTRAVENOUS CONTINUOUS
Status: DISCONTINUED | OUTPATIENT
Start: 2019-02-26 | End: 2019-02-26 | Stop reason: HOSPADM

## 2019-02-26 RX ORDER — MORPHINE SULFATE 4 MG/ML
4 INJECTION INTRAVENOUS
Status: COMPLETED | OUTPATIENT
Start: 2019-02-26 | End: 2019-02-26

## 2019-02-26 RX ORDER — PROCHLORPERAZINE EDISYLATE 5 MG/ML
10 INJECTION INTRAMUSCULAR; INTRAVENOUS
Status: COMPLETED | OUTPATIENT
Start: 2019-02-26 | End: 2019-02-26

## 2019-02-26 RX ORDER — PROMETHAZINE HYDROCHLORIDE 25 MG/1
25 TABLET ORAL
Qty: 12 TAB | Refills: 0 | Status: SHIPPED | OUTPATIENT
Start: 2019-02-26

## 2019-02-26 RX ADMIN — DIPHENHYDRAMINE HYDROCHLORIDE 12.5 MG: 50 INJECTION INTRAMUSCULAR; INTRAVENOUS at 17:58

## 2019-02-26 RX ADMIN — SODIUM CHLORIDE 1000 ML: 900 INJECTION, SOLUTION INTRAVENOUS at 13:15

## 2019-02-26 RX ADMIN — ONDANSETRON 4 MG: 2 INJECTION INTRAMUSCULAR; INTRAVENOUS at 11:41

## 2019-02-26 RX ADMIN — PROCHLORPERAZINE EDISYLATE 10 MG: 5 INJECTION INTRAMUSCULAR; INTRAVENOUS at 12:38

## 2019-02-26 RX ADMIN — DIPHENHYDRAMINE HYDROCHLORIDE 25 MG: 50 INJECTION INTRAMUSCULAR; INTRAVENOUS at 12:38

## 2019-02-26 RX ADMIN — MORPHINE SULFATE 4 MG: 4 INJECTION INTRAVENOUS at 12:38

## 2019-02-26 RX ADMIN — PROCHLORPERAZINE EDISYLATE 5 MG: 5 INJECTION INTRAMUSCULAR; INTRAVENOUS at 17:58

## 2019-02-26 RX ADMIN — PANTOPRAZOLE SODIUM 40 MG: 40 INJECTION, POWDER, FOR SOLUTION INTRAVENOUS at 14:28

## 2019-02-26 RX ADMIN — SODIUM CHLORIDE 125 ML/HR: 900 INJECTION, SOLUTION INTRAVENOUS at 13:52

## 2019-02-26 RX ADMIN — IOPAMIDOL 100 ML: 612 INJECTION, SOLUTION INTRAVENOUS at 15:34

## 2019-02-26 NOTE — ED PROVIDER NOTES
EMERGENCY DEPARTMENT HISTORY AND PHYSICAL EXAM 
 
11:23 AM 
 
 
Date: 2/26/2019 Patient Name: Tamiko Abbott History of Presenting Illness Chief Complaint Patient presents with  Vomiting History Provided By: Patient Additional History (Context): Tamiko Abbott is a 79 y.o. male with hypertension who presents with n/v/d and abd pain onset all week. The pt indicated that he has a hiatal hernia and stated that \"whenever the hernia flares up, he has these symptoms\". He is also under a lot of stress and has been gaining weight due to \"his knees needing replacements\". Admits to taking Pepto Bismol, but it caused his stool to be \"darkened\" and watery. He is having a hard time sleeping as well. Denies recent ETOH use. PCP: Jony Werner MD 
 
Chief Complaint: N/v/d Duration: All week Timing:  Not obtained at this time Location: Not obtained at this time Quality: Not obtained at this time Severity: Not obtained at this time Modifying Factors: Took Pepto-Bismol Associated Symptoms: abd pain Current Facility-Administered Medications Medication Dose Route Frequency Provider Last Rate Last Dose  
 0.9% sodium chloride infusion  125 mL/hr IntraVENous CONTINUOUS Aundrea Samuel  mL/hr at 02/26/19 1352 125 mL/hr at 02/26/19 1352  pantoprazole (PROTONIX) injection 40 mg  40 mg IntraVENous NOW Aundrea Samuel MD      
 
Current Outpatient Medications Medication Sig Dispense Refill  amLODIPine (NORVASC) 10 mg tablet Take 1 Tab by mouth daily. 30 Tab 0  
 terazosin (HYTRIN) 10 mg capsule Take 10 mg by mouth nightly.  mometasone-formoterol (DULERA) 200-5 mcg/actuation HFA inhaler Take 2 Puffs by inhalation two (2) times a day.  tiZANidine (ZANAFLEX) 4 mg tablet Take 2-3 Tabs by mouth three (3) times daily as needed (spasm). Dose change 720 Tab 5  DIPHENHYDRAMINE HCL (BENADRYL ALLERGY PO) Take  by mouth.  IBUPROFEN (ADVIL LIQUI-GEL PO) Take  by mouth.  promethazine (PHENERGAN) 25 mg tablet Take 1 Tab by mouth every six (6) hours as needed for Nausea. 25 Tab 0  
 ergocalciferol (VITAMIN D) 50,000 unit capsule Take 50,000 Units by mouth as directed.  buPROPion SR (WELLBUTRIN SR) 150 mg SR tablet Take  by mouth two (2) times a day.  fluocinoNIDE (LIDEX) 0.05 % topical cream Apply  to affected area two (2) times a day.  magnesium oxide (MAG-OX) 400 mg tablet Take 400 mg by mouth daily.  ACETAMINOPHEN/DP-HYDRAM HCL (ACETAMINOPHEN PM PO) Take  by mouth.  omeprazole (PRILOSEC) 20 mg capsule Take 20 mg by mouth two (2) times daily (with meals).  albuterol sulfate (PROVENTIL;VENTOLIN) 2.5 mg/0.5 mL Nebu 2.5 mg by Nebulization route once as needed.  sertraline (ZOLOFT) 100 mg tablet Take 100 mg by mouth daily.  triamcinolone acetonide (KENALOG) 0.1 % ointment Apply  to affected area two (2) times a day. use thin layer Past History Past Medical History: 
Past Medical History:  
Diagnosis Date  Arthritis  CTS (carpal tunnel syndrome)  DJD (degenerative joint disease) of knee Bilateral; endstage degenerative arthritis of right knee  Gastric ulcer, unspecified as acute or chronic, without mention of hemorrhage or perforation   
 patient listed stomach problems or ulcers  Hypertension  Knee pain  Lumbar pain  Medial meniscus tear May 2011 Right knee Past Surgical History: 
Past Surgical History:  
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    
 left thumb joint  HX TONSILLECTOMY  CA RMVL LUNG OTHER THAN PNEUMONECTOMY 1 LOBE LOBECT    
 right for cancer in 2001 - no chemo Family History: 
Family History Problem Relation Age of Onset  Hypertension Father  Asthma Father  Arthritis-osteo Father Social History: 
Social History Tobacco Use  Smoking status: Never Smoker Substance Use Topics  Alcohol use: No  
 Drug use: No  
 
 
Allergies: Allergies Allergen Reactions  Aspirin Other (comments) Stomach bleed  Benazepril Angioedema  Metoprolol Other (comments) Sexual dysfunction  Pcn [Penicillins] Rash Review of Systems Review of Systems Constitutional: Negative for activity change, fatigue and fever. HENT: Negative for congestion and rhinorrhea. Eyes: Negative for visual disturbance. Respiratory: Negative for shortness of breath. Cardiovascular: Negative for chest pain and palpitations. Gastrointestinal: Positive for abdominal pain, diarrhea, nausea and vomiting. Genitourinary: Negative for dysuria and hematuria. Musculoskeletal: Negative for back pain. Skin: Negative for rash. Neurological: Negative for dizziness, weakness and light-headedness. All other systems reviewed and are negative. Physical Exam  
 
Visit Vitals /73 Pulse (!) 57 Temp 98.7 °F (37.1 °C) Resp 8 Wt 123.2 kg (271 lb 9.6 oz) SpO2 92% BMI 33.95 kg/m² Physical Exam  
Constitutional: He is oriented to person, place, and time. He appears well-developed and well-nourished. No distress. Vomiting on exam   
HENT:  
Head: Normocephalic and atraumatic. Right Ear: External ear normal.  
Left Ear: External ear normal.  
Nose: Nose normal.  
Dry oral mucosa Eyes: Conjunctivae and EOM are normal. Pupils are equal, round, and reactive to light. No scleral icterus. Neck: Normal range of motion. Neck supple. No JVD present. No tracheal deviation present. No thyromegaly present. Cardiovascular: Normal rate, regular rhythm, normal heart sounds and intact distal pulses. Exam reveals no gallop and no friction rub. No murmur heard. Pulmonary/Chest: Effort normal and breath sounds normal. He exhibits no tenderness. Abdominal: Bowel sounds are normal. He exhibits no distension. There is tenderness. There is no rebound and no guarding. RUQ, epigastric, and LUQ pain without guarding Musculoskeletal: Normal range of motion. He exhibits no edema or tenderness. No calf pain B knee pain with movement Lymphadenopathy:  
  He has no cervical adenopathy. Neurological: He is alert and oriented to person, place, and time. No cranial nerve deficit. Coordination normal.  
No sensory loss, Gait normal, Motor 5/5 Skin: Skin is warm and dry. Psychiatric: He has a normal mood and affect. His behavior is normal. Judgment and thought content normal.  
Supportive wife at the bedside Nursing note and vitals reviewed. Diagnostic Study Results Labs - Recent Results (from the past 12 hour(s)) METABOLIC PANEL, BASIC Collection Time: 02/26/19 10:36 AM  
Result Value Ref Range Sodium 133 (L) 136 - 145 mmol/L Potassium 4.1 3.5 - 5.5 mmol/L Chloride 102 100 - 108 mmol/L  
 CO2 25 21 - 32 mmol/L Anion gap 6 3.0 - 18 mmol/L Glucose 142 (H) 74 - 99 mg/dL BUN 14 7.0 - 18 MG/DL Creatinine 1.10 0.6 - 1.3 MG/DL  
 BUN/Creatinine ratio 13 12 - 20 GFR est AA >60 >60 ml/min/1.73m2 GFR est non-AA >60 >60 ml/min/1.73m2 Calcium 9.0 8.5 - 10.1 MG/DL  
CBC WITH AUTOMATED DIFF Collection Time: 02/26/19 10:36 AM  
Result Value Ref Range WBC 9.5 4.6 - 13.2 K/uL  
 RBC 4.96 4.70 - 5.50 M/uL  
 HGB 15.3 13.0 - 16.0 g/dL HCT 43.6 36.0 - 48.0 % MCV 87.9 74.0 - 97.0 FL  
 MCH 30.8 24.0 - 34.0 PG  
 MCHC 35.1 31.0 - 37.0 g/dL  
 RDW 13.6 11.6 - 14.5 % PLATELET 359 986 - 405 K/uL MPV 10.5 9.2 - 11.8 FL  
 NEUTROPHILS 74 (H) 40 - 73 % LYMPHOCYTES 14 (L) 21 - 52 % MONOCYTES 10 3 - 10 % EOSINOPHILS 2 0 - 5 % BASOPHILS 0 0 - 2 %  
 ABS. NEUTROPHILS 7.1 1.8 - 8.0 K/UL ABS. LYMPHOCYTES 1.3 0.9 - 3.6 K/UL  
 ABS. MONOCYTES 0.9 0.05 - 1.2 K/UL  
 ABS. EOSINOPHILS 0.2 0.0 - 0.4 K/UL  
 ABS. BASOPHILS 0.0 0.0 - 0.1 K/UL  
 DF AUTOMATED MAGNESIUM Collection Time: 02/26/19 10:36 AM  
Result Value Ref Range Magnesium 2.3 1.6 - 2.6 mg/dL LIPASE Collection Time: 02/26/19 10:36 AM  
Result Value Ref Range Lipase 133 73 - 393 U/L  
HEPATIC FUNCTION PANEL Collection Time: 02/26/19 10:36 AM  
Result Value Ref Range Protein, total 7.1 6.4 - 8.2 g/dL Albumin 3.8 3.4 - 5.0 g/dL Globulin 3.3 2.0 - 4.0 g/dL A-G Ratio 1.2 0.8 - 1.7 Bilirubin, total 0.6 0.2 - 1.0 MG/DL Bilirubin, direct 0.2 0.0 - 0.2 MG/DL Alk. phosphatase 93 45 - 117 U/L  
 AST (SGOT) 21 15 - 37 U/L  
 ALT (SGPT) 39 16 - 61 U/L  
EKG, 12 LEAD, INITIAL Collection Time: 02/26/19 11:46 AM  
Result Value Ref Range Ventricular Rate 66 BPM  
 Atrial Rate 66 BPM  
 P-R Interval 184 ms QRS Duration 100 ms Q-T Interval 422 ms QTC Calculation (Bezet) 442 ms Calculated P Axis 55 degrees Calculated R Axis -30 degrees Calculated T Axis 54 degrees Diagnosis Normal sinus rhythm Left axis deviation Abnormal ECG When compared with ECG of 15-JUL-2018 02:13, No significant change was found Radiologic Studies -  
XR ABD ACUTE W 1 V CHEST Final Result IMPRESSION:  
  
No acute chest disease. Nonspecific multiple small small bowel air-fluid levels without dilatation. Follow-up may be of benefit as clinically indicated. Debby Calvin CT ABD PELV W CONT    (Results Pending) Chest x-ray - 1:52 PM Scaring of right base. CT Abd- 1:52 PM Non specific bowel pattern Medical Decision Making I am the first provider for this patient. I reviewed the vital signs, available nursing notes, past medical history, past surgical history, family history and social history. Vital Signs-Reviewed the patient's vital signs. Pulse Oximetry Analysis -  100% on room air (Interpretation) WNL Cardiac Monitor: 
Rate: 70 bpm  
 
EKG: Interpreted by the EP. Time Interpreted:  
 Rate: 66 Rhythm: Normal Sinus Rhythm Interpretation:1146 A Records Reviewed: Nursing Notes and Old Medical Records (Time of Review: 11:23 AM) ED Course: Progress Notes, Reevaluation, and Consults: 
Reevaluation: 5:42 PM Pt is feeling better and is requesting one more dose of nausea medication before he goes home. Reviewed pt's CAT scan results at length with him and his wife. Instructed him to follow up with GI and PCP for cancer concern and pt verbalized his understanding. Pt is tolerating PO and will go home on Phenergan and have him return if symptoms worsen. Provider Notes (Medical Decision Making): MDM Number of Diagnoses or Management Options Anxiety state:  
Gastritis without bleeding, unspecified chronicity, unspecified gastritis type:  
Nausea and vomiting, intractability of vomiting not specified, unspecified vomiting type:  
Diagnosis management comments: Pt is 75yo female with a hx of HTN, lung mass, PUD with hiatal hernia per patient, CTS, chronic knee pain, hx of ace induced angioedema requiring admission presents with complaint of LUQ pain with vomiting and chills that worsened overnight. He notes that this has happened in the past and usually gets better. He has been taking bismuth salicylate and maalox without relief. Upon a record search I found no recent abdominal imaging. Will send cardiac labs, AXR, CT AP, lfts, lipase, hydrated, pain control then reevaluate. Feliciana Councilman, DO 12:32 PM 
 
 
 
 
Diagnosis Clinical Impression: 1. Nausea and vomiting, intractability of vomiting not specified, unspecified vomiting type 2. Gastritis without bleeding, unspecified chronicity, unspecified gastritis type 3. Anxiety state Disposition: DC Follow-up Information None Medication List  
  
 ASK your doctor about these medications ACETAMINOPHEN PM PO ADVIL LIQUI-GEL PO 
  
albuterol sulfate 2.5 mg/0.5 mL Nebu nebulizer solution Commonly known as:  PROVENTIL;VENTOLIN 
  
amLODIPine 10 mg tablet Commonly known as:  Raquel Brianch Take 1 Tab by mouth daily. BENADRYL ALLERGY PO 
  
buPROPion  mg SR tablet Commonly known as:  WELLBUTRIN SR 
  
DULERA 200-5 mcg/actuation HFA inhaler Generic drug:  mometasone-formoterol 
  
fluocinoNIDE 0.05 % topical cream 
Commonly known as:  LIDEX 
  
magnesium oxide 400 mg tablet Commonly known as:  MAG-OX PriLOSEC 20 mg capsule Generic drug:  omeprazole 
  
promethazine 25 mg tablet Commonly known as:  PHENERGAN Take 1 Tab by mouth every six (6) hours as needed for Nausea. terazosin 10 mg capsule Commonly known as:  HYTRIN 
  
tiZANidine 4 mg tablet Commonly known as:  Phoebe Silver Take 2-3 Tabs by mouth three (3) times daily as needed (spasm). Dose change 
  
triamcinolone acetonide 0.1 % ointment Commonly known as:  KENALOG 
  
VITAMIN D2 50,000 unit capsule Generic drug:  ergocalciferol ZOLOFT 100 mg tablet Generic drug:  sertraline 
  
  
 
_______________________________ Scribe Attestation I-Arlet Csear acting as a scribe for and in the presence of Nash Escobedo MD     
February 26, 2019 at 11:23 AM 
    
Provider Attestation:     
I personally performed the services described in the documentation, reviewed the documentation, as recorded by the scribe in my presence, and it accurately and completely records my words and actions. February 26, 2019 at 11:23 AM - Nash Escobedo MD   
_______________________________

## 2019-02-26 NOTE — DISCHARGE INSTRUCTIONS
Patient Education        Learning About Generalized Anxiety Disorder  What is generalized anxiety disorder? We all worry. It's a normal part of life. But when you have generalized anxiety disorder, you worry about lots of things and have a hard time stopping your worry. This worry or anxiety interferes with your relationships, work, and life. What causes it? The cause is not known. But it may be passed down through families. What are the symptoms? You may feel anxious or worry most days about things like work, relationships, health, or money. You may worry about things that are unlikely to happen. You find it hard to stop or control the worry. Because you worry a lot and try hard to stop worrying, you may feel restless, tired, tense, or cranky. You may also find it hard to think or sleep. And you may have headaches or an upset stomach. How is it diagnosed? Your doctor will ask about your health and how often you worry or feel anxious. He or she may ask about other symptoms, like whether you:  · Feel restless. · Feel tired. · Have a hard time thinking or feel that your mind goes blank. · Feel cranky. · Have tense muscles. · Have sleep problems. A physical exam and tests can help make sure that your symptoms aren't caused by a different condition, such as a thyroid problem. How is it treated? Counseling and medicine can both work to treat anxiety. The two are often used along with lifestyle changes. Cognitive-behavioral therapy (CBT) is a type of counseling that's used to help treat anxiety. In CBT, you learn how to notice and replace thoughts that make you feel worried. It also can help you learn how to relax when you worry. Medicines can help. These medicines are often also used for depression. Selective serotonin reuptake inhibitors (SSRIs) are often tried first. But there are other medicines that your doctor may use. You may need to try a few medicines to find one that works well.   Many people feel better by getting regular exercise, eating healthy meals, and getting good sleep. Mindfulness--focusing on things that happen in the present moment--also can help reduce your anxiety. What can you expect when you have it? Having anxiety can be upsetting. Some people might feel less worried and stressed after a couple of months of treatment. But for other people, it might take longer to feel better. Reaching out to people for help is important. Try not to isolate yourself. Let your family and friends help you. Find someone you can trust and confide in. Talk to that person. When you know what anxiety is--and how you can get help for it--you can start to learn new ways of thinking. This can help you cope and work through your anxiety. Follow-up care is a key part of your treatment and safety. Be sure to make and go to all appointments, and call your doctor if you are having problems. It's also a good idea to know your test results and keep a list of the medicines you take. Where can you learn more? Go to http://ravi-salvador.info/. Enter G110 in the search box to learn more about \"Learning About Generalized Anxiety Disorder. \"  Current as of: September 11, 2018  Content Version: 11.9  © 7888-6580 [employer]. Care instructions adapted under license by [employer] (which disclaims liability or warranty for this information). If you have questions about a medical condition or this instruction, always ask your healthcare professional. [place] [address] any warranty or liability for your use of this information. Patient Education        Gastritis: Care Instructions  Your Care Instructions    Gastritis is a sore and upset stomach. It happens when something irritates the stomach lining. Many things can cause it. These include an infection such as the flu or something you ate or drank.  Medicines or a sore on the lining of the stomach (ulcer) also can cause it. Your belly may bloat and ache. You may belch, vomit, and feel sick to your stomach. You should be able to relieve the problem by taking medicine. And it may help to change your diet. If gastritis lasts, your doctor may prescribe medicine. Follow-up care is a key part of your treatment and safety. Be sure to make and go to all appointments, and call your doctor if you are having problems. It's also a good idea to know your test results and keep a list of the medicines you take. How can you care for yourself at home? · If your doctor prescribed antibiotics, take them as directed. Do not stop taking them just because you feel better. You need to take the full course of antibiotics. · Be safe with medicines. If your doctor prescribed medicine to decrease stomach acid, take it as directed. Call your doctor if you think you are having a problem with your medicine. · Do not take any other medicine, including over-the-counter pain relievers, without talking to your doctor first.  · If your doctor recommends over-the-counter medicine to reduce stomach acid, such as Pepcid AC, Prilosec, Tagamet HB, or Zantac 75, follow the directions on the label. · Drink plenty of fluids (enough so that your urine is light yellow or clear like water) to prevent dehydration. Choose water and other caffeine-free clear liquids. If you have kidney, heart, or liver disease and have to limit fluids, talk with your doctor before you increase the amount of fluids you drink. · Limit how much alcohol you drink. · Avoid coffee, tea, cola drinks, chocolate, and other foods with caffeine. They increase stomach acid. When should you call for help? Call 911 anytime you think you may need emergency care.  For example, call if:    · You vomit blood or what looks like coffee grounds.     · You pass maroon or very bloody stools.    Call your doctor now or seek immediate medical care if:    · You start breathing fast and have not produced urine in the last 8 hours.     · You cannot keep fluids down.    Watch closely for changes in your health, and be sure to contact your doctor if:    · You do not get better as expected. Where can you learn more? Go to http://ravi-salvador.info/. Enter [id number] in the search box to learn more about \"Gastritis: Care Instructions. \"  Current as of: March 27, 2018  Content Version: 11.9  © 0780-2613 [employer]. Care instructions adapted under license by [employer] (which disclaims liability or warranty for this information). If you have questions about a medical condition or this instruction, always ask your healthcare professional. [address] any warranty or liability for your use of this information.

## 2019-02-26 NOTE — ED NOTES
Pt arrived to ED via EMS from home with c/o n/v/d and 10/10 pain starting x1wk. Pt states today is the worse . After assessment Pt vitals stable, dry heeving. Pt states having a hernia in LUQ and taking promethazine 25mg for nausea Pt denies chest pain, shortness of breath. Pt is AOx4, able to transfer, speaking in complete sentences and able to answer questions without difficulty.

## 2019-03-04 ENCOUNTER — HOSPITAL ENCOUNTER (OUTPATIENT)
Age: 69
Discharge: HOME | End: 2019-03-04
Attending: FAMILY MEDICINE
Payer: MEDICARE

## 2019-03-04 DIAGNOSIS — R16.0 HEPATOMEGALY: ICD-10-CM

## 2019-03-04 PROCEDURE — A9577 INJ MULTIHANCE: HCPCS

## 2019-03-04 PROCEDURE — 74011250636 HC RX REV CODE- 250/636

## 2019-03-04 PROCEDURE — 74183 MRI ABD W/O CNTR FLWD CNTR: CPT

## 2019-03-04 RX ADMIN — GADOBENATE DIMEGLUMINE 25 ML: 529 INJECTION, SOLUTION INTRAVENOUS at 12:00

## 2019-06-14 ENCOUNTER — APPOINTMENT (OUTPATIENT)
Dept: GENERAL RADIOLOGY | Age: 69
End: 2019-06-14
Attending: EMERGENCY MEDICINE
Payer: MEDICARE

## 2019-06-14 ENCOUNTER — HOSPITAL ENCOUNTER (EMERGENCY)
Age: 69
Discharge: HOME | End: 2019-06-14
Attending: EMERGENCY MEDICINE
Payer: MEDICARE

## 2019-06-14 VITALS
RESPIRATION RATE: 16 BRPM | DIASTOLIC BLOOD PRESSURE: 82 MMHG | OXYGEN SATURATION: 95 % | TEMPERATURE: 96.6 F | WEIGHT: 280 LBS | HEART RATE: 66 BPM | SYSTOLIC BLOOD PRESSURE: 150 MMHG | HEIGHT: 75 IN | BODY MASS INDEX: 34.82 KG/M2

## 2019-06-14 DIAGNOSIS — S82.891A CLOSED FRACTURE OF RIGHT ANKLE, INITIAL ENCOUNTER: Primary | ICD-10-CM

## 2019-06-14 PROCEDURE — 75810000053 HC SPLINT APPLICATION

## 2019-06-14 PROCEDURE — 99283 EMERGENCY DEPT VISIT LOW MDM: CPT

## 2019-06-14 PROCEDURE — 73610 X-RAY EXAM OF ANKLE: CPT

## 2019-06-14 RX ORDER — HYDROCODONE BITARTRATE AND ACETAMINOPHEN 5; 325 MG/1; MG/1
1 TABLET ORAL
Qty: 10 TAB | Refills: 0 | Status: SHIPPED | OUTPATIENT
Start: 2019-06-14 | End: 2019-06-17

## 2019-06-14 NOTE — DISCHARGE INSTRUCTIONS
Patient Education        Broken Ankle: Care Instructions  Your Care Instructions    An ankle may break (fracture) during sports, a fall, or other accidents. Fractures can range from a small, hairline crack, to a bone or bones broken into two or more pieces. Your treatment depends on how bad the break is. Your doctor may have put your ankle in a splint or cast to allow it to heal or to keep it stable until you see another doctor. It may take weeks or months for your ankle to heal. You can help your ankle heal with some care at home. You heal best when you take good care of yourself. Eat a variety of healthy foods, and don't smoke. You may have had a sedative to help you relax. You may be unsteady after having sedation. It can take a few hours for the medicine's effects to wear off. Common side effects of sedation include nausea, vomiting, and feeling sleepy or tired. The doctor has checked you carefully, but problems can develop later. If you notice any problems or new symptoms,  get medical treatment right away. Follow-up care is a key part of your treatment and safety. Be sure to make and go to all appointments, and call your doctor if you are having problems. It's also a good idea to know your test results and keep a list of the medicines you take. How can you care for yourself at home? · If the doctor gave you a sedative:  ? For 24 hours, don't do anything that requires attention to detail. It takes time for the medicine's effects to completely wear off.  ? For your safety, do not drive or operate any machinery that could be dangerous. Wait until the medicine wears off and you can think clearly and react easily. · Put ice or a cold pack on your ankle for 10 to 20 minutes at a time. Try to do this every 1 to 2 hours for the next 3 days (when you are awake). Put a thin cloth between the ice and your cast or splint. Keep your cast or splint dry. · Follow the cast care instructions your doctor gives you.  If you have a splint, do not take it off unless your doctor tells you to. · Be safe with medicines. Take pain medicines exactly as directed. ? If the doctor gave you a prescription medicine for pain, take it as prescribed. ? If you are not taking a prescription pain medicine, ask your doctor if you can take an over-the-counter medicine. · Prop up your leg on pillows in the first few days after the injury. Keep the ankle higher than the level of your heart. This will help reduce swelling. · Do not put weight on your ankle unless your doctor tells you to. Use crutches to walk. · Follow instructions for exercises to keep your leg strong. · Wiggle your toes often to reduce swelling and stiffness. When should you call for help? Call 911 anytime you think you may need emergency care. For example, call if:    · You have chest pain, are short of breath, or you cough up blood.     · You are very sleepy and you have trouble waking up.    Call your doctor now or seek immediate medical care if:    · You have new or worse nausea or vomiting.     · You have new or worse pain.     · Your foot is cool or pale or changes color.     · You have tingling, weakness, or numbness in your toes.     · Your cast or splint feels too tight.     · You have signs of a blood clot in your leg (called a deep vein thrombosis), such as:  ? Pain in your calf, back of the knee, thigh, or groin. ? Redness or swelling in your leg.    Watch closely for changes in your health, and be sure to contact your doctor if:    · You have a problem with your splint or cast.     · You do not get better as expected. Where can you learn more? Go to http://ravi-salvador.info/. Enter P763 in the search box to learn more about \"Broken Ankle: Care Instructions. \"  Current as of: September 20, 2018  Content Version: 11.9  © 4922-3952 [employer].  Care instructions adapted under license by [employer] (which disclaims liability or warranty for this information). If you have questions about a medical condition or this instruction, always ask your healthcare professional. [place] [address] any warranty or liability for your use of this information.

## 2019-06-14 NOTE — ED TRIAGE NOTES
\"I was coming off of the ladder and I think I might have twisted my ankle. This happened two weeks ago. It's been hurting and the swelling is worse. I normally have swelling to my ankles, but it's worse since then. \"

## 2019-06-14 NOTE — ED PROVIDER NOTES
EMERGENCY DEPARTMENT HISTORY AND PHYSICAL EXAM    Date: 6/14/2019  Patient Name: Alexandra Salguero    History of Presenting Illness     Chief Complaint   Patient presents with    Ankle Pain         History Provided By: Patient and Patient's Wife    Additional History (Context): Alexandra Salguero is a 79 y.o. male with hypertension, osteoarthritis and COPD who presents with right ankle pain after miss-stepping as he came off a ladder approximately 5 days ago. Feels like he rolled his ankle. Pain worsens when he is ambulating and feels as if his ankle swells up when he tries to bear weight. Denies numbness weakness knee pain or foot pain. He does have occasional heel pain as well. PCP: Agapito Barrios MD    Current Outpatient Medications   Medication Sig Dispense Refill    HYDROcodone-acetaminophen (NORCO) 5-325 mg per tablet Take 1 Tab by mouth every six (6) hours as needed for Pain for up to 3 days. Max Daily Amount: 4 Tabs. Take 1 tablet PO every 6 hours as needed for pain control. 10 Tab 0    promethazine (PHENERGAN) 25 mg tablet Take 1 Tab by mouth every six (6) hours as needed for Nausea. 12 Tab 0    amLODIPine (NORVASC) 10 mg tablet Take 1 Tab by mouth daily. 30 Tab 0    terazosin (HYTRIN) 10 mg capsule Take 10 mg by mouth nightly.  mometasone-formoterol (DULERA) 200-5 mcg/actuation HFA inhaler Take 2 Puffs by inhalation two (2) times a day.  tiZANidine (ZANAFLEX) 4 mg tablet Take 2-3 Tabs by mouth three (3) times daily as needed (spasm). Dose change 720 Tab 5    DIPHENHYDRAMINE HCL (BENADRYL ALLERGY PO) Take  by mouth.  ergocalciferol (VITAMIN D) 50,000 unit capsule Take 50,000 Units by mouth as directed.  buPROPion SR (WELLBUTRIN SR) 150 mg SR tablet Take  by mouth two (2) times a day.  magnesium oxide (MAG-OX) 400 mg tablet Take 400 mg by mouth daily.  omeprazole (PRILOSEC) 20 mg capsule Take 20 mg by mouth two (2) times daily (with meals).       albuterol sulfate (PROVENTIL;VENTOLIN) 2.5 mg/0.5 mL Nebu 2.5 mg by Nebulization route once as needed.  sertraline (ZOLOFT) 100 mg tablet Take 100 mg by mouth daily.  triamcinolone acetonide (KENALOG) 0.1 % ointment Apply  to affected area two (2) times a day. use thin layer          Past History     Past Medical History:  Past Medical History:   Diagnosis Date    Arthritis     Chronic obstructive pulmonary disease (HCC)     CTS (carpal tunnel syndrome)     DJD (degenerative joint disease) of knee     Bilateral; endstage degenerative arthritis of right knee    Gastric ulcer, unspecified as acute or chronic, without mention of hemorrhage or perforation     patient listed stomach problems or ulcers    Hypertension     Knee pain     Lumbar pain     Medial meniscus tear May 2011    Right knee       Past Surgical History:  Past Surgical History:   Procedure Laterality Date    HX HEENT      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    HX KNEE ARTHROSCOPY  5/26/2011    Right knee with partial medial meniscectomy of right knee    HX ORTHOPAEDIC      left thumb joint     HX TONSILLECTOMY      SC RMVL LUNG OTHER THAN PNEUMONECTOMY 1 LOBE LOBECT      right for cancer in 2001 - no chemo        Family History:  Family History   Problem Relation Age of Onset    Hypertension Father     Asthma Father     Arthritis-osteo Father        Social History:  Social History     Tobacco Use    Smoking status: Never Smoker    Smokeless tobacco: Never Used   Substance Use Topics    Alcohol use: No    Drug use: No       Allergies: Allergies   Allergen Reactions    Aspirin Other (comments)     Stomach bleed    Benazepril Angioedema    Metoprolol Other (comments)     Sexual dysfunction    Pcn [Penicillins] Rash         Review of Systems   Review of Systems   Musculoskeletal: Positive for arthralgias and gait problem. Negative for joint swelling. Neurological: Negative for weakness and numbness.      All Other Systems Negative  Physical Exam     Vitals:    06/14/19 1507   BP: 150/82   Pulse: 66   Resp: 16   Temp: 96.6 °F (35.9 °C)   SpO2: 95%   Weight: 127 kg (280 lb)   Height: 6' 3\" (1.905 m)     Physical Exam   Constitutional: Vital signs are normal. He appears well-developed and well-nourished. He is active. Non-toxic appearance. He does not appear ill. No distress. HENT:   Head: Normocephalic and atraumatic. Neck: Normal range of motion. Neck supple. Carotid bruit is not present. No tracheal deviation present. No thyromegaly present. Cardiovascular: Normal rate, regular rhythm and normal heart sounds. Exam reveals no gallop and no friction rub. No murmur heard. Pulmonary/Chest: Effort normal and breath sounds normal. No stridor. No respiratory distress. He has no wheezes. He has no rales. He exhibits no tenderness. Abdominal: Soft. He exhibits no distension and no mass. There is no tenderness. There is no rebound, no guarding and no CVA tenderness. Musculoskeletal: Normal range of motion. He exhibits tenderness. Right ankle: Diffuse medial joint line tenderness. Nontender lateral aspect of medial or lateral malleoli or calcaneus. Remainder of foot and knee nontender. DP PT pulses palpable. No Libia us brevis or longus distribution tenderness to palpation. Neurological: He is alert. Skin: Skin is warm, dry and intact. He is not diaphoretic. No pallor. Psychiatric: He has a normal mood and affect. His speech is normal and behavior is normal. Judgment and thought content normal.   Nursing note and vitals reviewed. Diagnostic Study Results     Labs -   No results found for this or any previous visit (from the past 12 hour(s)). Radiologic Studies -   XR ANKLE RT MIN 3 V   Final Result   IMPRESSION:  Nondisplaced fracture right medial malleolus. The ankle mortise is symmetric and intact. Soft tissue swelling over the medial malleolus.  .                    CT Results  (Last 48 hours)    None        CXR Results  (Last 48 hours)    None            Medical Decision Making   I am the first provider for this patient. I reviewed the vital signs, available nursing notes, past medical history, past surgical history, family history and social history. Vital Signs-Reviewed the patient's vital signs. Records Reviewed: Nursing Notes    Procedures:  Procedures   Provider Notes (Medical Decision Making): Treat pain splint and refer to Ortho. Splint applied by tech to right ankle; excellent position. N/v intact before and after application. MED RECONCILIATION:  No current facility-administered medications for this encounter. Current Outpatient Medications   Medication Sig    HYDROcodone-acetaminophen (NORCO) 5-325 mg per tablet Take 1 Tab by mouth every six (6) hours as needed for Pain for up to 3 days. Max Daily Amount: 4 Tabs. Take 1 tablet PO every 6 hours as needed for pain control.  promethazine (PHENERGAN) 25 mg tablet Take 1 Tab by mouth every six (6) hours as needed for Nausea.  amLODIPine (NORVASC) 10 mg tablet Take 1 Tab by mouth daily.  terazosin (HYTRIN) 10 mg capsule Take 10 mg by mouth nightly.  mometasone-formoterol (DULERA) 200-5 mcg/actuation HFA inhaler Take 2 Puffs by inhalation two (2) times a day.  tiZANidine (ZANAFLEX) 4 mg tablet Take 2-3 Tabs by mouth three (3) times daily as needed (spasm). Dose change    DIPHENHYDRAMINE HCL (BENADRYL ALLERGY PO) Take  by mouth.  ergocalciferol (VITAMIN D) 50,000 unit capsule Take 50,000 Units by mouth as directed.  buPROPion SR (WELLBUTRIN SR) 150 mg SR tablet Take  by mouth two (2) times a day.  magnesium oxide (MAG-OX) 400 mg tablet Take 400 mg by mouth daily.  omeprazole (PRILOSEC) 20 mg capsule Take 20 mg by mouth two (2) times daily (with meals).  albuterol sulfate (PROVENTIL;VENTOLIN) 2.5 mg/0.5 mL Nebu 2.5 mg by Nebulization route once as needed.     sertraline (ZOLOFT) 100 mg tablet Take 100 mg by mouth daily.    triamcinolone acetonide (KENALOG) 0.1 % ointment Apply  to affected area two (2) times a day. use thin layer        Disposition:  home    DISCHARGE NOTE:   4:05 PM    Pt has been reexamined. Patient has no new complaints, changes, or physical findings. Care plan outlined and precautions discussed. Results of x-rays were reviewed with the patient. All medications were reviewed with the patient; will d/c home with vicodin. All of pt's questions and concerns were addressed. Patient was instructed and agrees to follow up with ortho, as well as to return to the ED upon further deterioration. Patient is ready to go home. Follow-up Information     Follow up With Specialties Details Why Contact Info    Donice Favre, MD Orthopedic Surgery Schedule an appointment as soon as possible for a visit in 3 days  [address] [place] [address] [address] [facility].      [facility] EMERGENCY DEPT Emergency Medicine  If symptoms worsen return immediately [address]  [phone]          Current Discharge Medication List      START taking these medications    Details   HYDROcodone-acetaminophen (NORCO) 5-325 mg per tablet Take 1 Tab by mouth every six (6) hours as needed for Pain for up to 3 days. Max Daily Amount: 4 Tabs. Take 1 tablet PO every 6 hours as needed for pain control. Qty: 10 Tab, Refills: 0    Associated Diagnoses: Closed fracture of right ankle, initial encounter             Diagnosis     Clinical Impression:   1.  Closed fracture of right ankle, initial encounter

## 2019-06-14 NOTE — ED NOTES
Qasim Maciel reviewed discharge instructions with the patient. The patient verbalized understanding.

## 2019-06-18 ENCOUNTER — OFFICE VISIT (OUTPATIENT)
Dept: ORTHOPEDIC SURGERY | Age: 69
End: 2019-06-18

## 2019-06-18 VITALS
HEART RATE: 98 BPM | OXYGEN SATURATION: 96 % | HEIGHT: 75 IN | SYSTOLIC BLOOD PRESSURE: 144 MMHG | BODY MASS INDEX: 33.94 KG/M2 | WEIGHT: 273 LBS | TEMPERATURE: 97.8 F | DIASTOLIC BLOOD PRESSURE: 86 MMHG

## 2019-06-18 DIAGNOSIS — M25.471 ANKLE SWELLING, RIGHT: ICD-10-CM

## 2019-06-18 DIAGNOSIS — M25.571 ACUTE RIGHT ANKLE PAIN: ICD-10-CM

## 2019-06-18 DIAGNOSIS — S82.891A CLOSED FRACTURE OF RIGHT ANKLE, INITIAL ENCOUNTER: Primary | ICD-10-CM

## 2019-06-18 DIAGNOSIS — M19.071 PRIMARY OSTEOARTHRITIS OF RIGHT ANKLE: ICD-10-CM

## 2019-06-18 NOTE — PROGRESS NOTES
Patient: Philip Adames                     MRN: [id number]       SSN: [id number]  YOB: 1950                       AGE: 79 y.o. SEX: male    PCP:Siri Holman MD    DOI: 6/9/19    Chief Complaint:   Chief Complaint   Patient presents with    Ankle Pain     right, twisted         HPI:     Philip Adames is a 79 y.o. male who presents today for evaluation of right ankle pain. Patient states that he miss-stepping as he came off a ladder stepping down on a metal plate and rolled his ankle. Patient states that he felt/heard a pop in his ankle. His condition was not improving so the patient was seen in the  ED on 6/14/19. X-rays were performed which revealed a non-displaced medial malleolar fracture. Patient was placed in a posterior splint, given crutches and advised to follow up with our office. He has had continued pain and swelling to the medial aspect of his ankle. He describes the pain as throbbing pulsing and is rated 10/10 when he attempts to ambulate without the crutches. Pain worsens when he is ambulating and feels better when he is at rest. He denies any radiating pain. He has not taken any medication for pain. Patient has been driving in the splint as he states his wife cannot drive. Patient has been advised to not drive. REVIEW OF SYSTEMS:                       Review of Systems: Chest pain: no  Shortness of breath: no  Fever: no  Night sweats: no  Weight loss: no  Constitutional signs: no    PHYSICAL EXAM:     Visit Vitals  /86 (BP 1 Location: Left arm, BP Patient Position: Sitting)   Pulse 98   Temp 97.8 °F (36.6 °C) (Oral)   Ht 6' 3\" (1.905 m)   Wt 273 lb (123.8 kg)   SpO2 96%   BMI 34.12 kg/m²         Pain Scale: 10 - Worst pain ever/10    Appearance: Philip Adames is an alert, well appearing 79 y.o. male who is oriented to person, place/time, and who follows commands. Seated comfortably in no acute distress.  Speech normal in context and clarity. Psychiatric: Affect, mood, judgement, behavior and conduct are appropriate. Memory grossly intact, no dementia  Patient arrives to office via: with assistive device: crutches   Patient accompanied in the examination room  by: spouse  HEENT: Head normocephalic & atraumatic. Both pupils are round, non icteric sclera   The EOM are intact. External ear normal appearance, hearing intact, no                hearing aid device. External nose normal appearing. Mouth/Throat:                Oropharynx is clear and moist, able to handle oral secretions w/out difficulty,               airway patent. NECK: Supple. Normal ROM, No lymphadenopathy. Trachea is midline. No bruising, swelling or deformity              Respiratory: Normal effort and breath sounds. Breathing is unlabored without accessory chest muscle use. No audible wheezing from mouth. No respiratory distress. Abdomen: Non-distended, soft and non-tender, no rebound and no guarding, no abnormal bowel sounds  Cardiovascular/Peripheral Vascular: Normal pulses to each hand and foot. No varicosities noted. No apparent venous status noted. Integumentary: Skin intact, warm and normal color. No abrasions, blisters, wounds, ulceration, or rash. Irritation to forefoot from his shoe  BACK: No CVA or spinal tenderness    MUSCULOSKELETAL EXAM of:  right foot/ankle       FULLNESS OR SWELLING:  moderate edema to medial ankle, no erythema, no ecchymosis noted. TENDERNESS:  moderate tenderness to palpation of the medial > lateral ankle       DVT ASSESSMENT:  Calf is soft and non-tender. There is no evidence of DVT or infection       ROM: not tested       STABILITY: Not tested       DEFORMITY: not present        MOTOR/STRENGTH/TONE: strength WNL in tested muscle groups       no fasciculations or no involuntary movements noted         SENSORY EXAM:  Grossly intact, no sensory deficits noted       NEUROVASCULAR: Neurovascular status is grossly intact.  No significant edema        or embolic phenomena to foot/toes, hand/fingers. Extremities are warm and appear well perfused. Positive distal pulses and capillary refill      IMPRESSION:     1. Closed fracture of right ankle, initial encounter    2. Acute right ankle pain    3. Ankle swelling, right    4. Primary osteoarthritis of right ankle          PLAN:         Orders Placed This Encounter    Generic Supply Order    HI CLOSED TREATMENT PST MALLEOLUS FRACTURE W/O MANIP             Wear CAM boot and instructed - no weight bearing. Do not drive in the CAM boot  Follow RICE protocol listed below  Follow up next week          Patient understands treatment plan and has been provided with patient education. PAST MEDICAL HISTORY:     Past Medical History:   Diagnosis Date    Arthritis     Chronic obstructive pulmonary disease ([facility] [address]     CTS (carpal tunnel syndrome)     DJD (degenerative joint disease) of knee     Bilateral; endstage degenerative arthritis of right knee    Gastric ulcer, unspecified as acute or chronic, without mention of hemorrhage or perforation     patient listed stomach problems or ulcers    Hypertension     Knee pain     Lumbar pain     Medial meniscus tear May 2011    Right knee       MEDICATIONS:     Current Outpatient Medications   Medication Sig    promethazine (PHENERGAN) 25 mg tablet Take 1 Tab by mouth every six (6) hours as needed for Nausea.  amLODIPine (NORVASC) 10 mg tablet Take 1 Tab by mouth daily.  terazosin (HYTRIN) 10 mg capsule Take 10 mg by mouth nightly.  mometasone-formoterol (DULERA) 200-5 mcg/actuation HFA inhaler Take 2 Puffs by inhalation two (2) times a day.  tiZANidine (ZANAFLEX) 4 mg tablet Take 2-3 Tabs by mouth three (3) times daily as needed (spasm). Dose change    DIPHENHYDRAMINE HCL (BENADRYL ALLERGY PO) Take  by mouth.  ergocalciferol (VITAMIN D) 50,000 unit capsule Take 50,000 Units by mouth as directed.     buPROPion SR (WELLBUTRIN SR) 150 mg SR tablet Take  by mouth two (2) times a day.  magnesium oxide (MAG-OX) 400 mg tablet Take 400 mg by mouth daily.  omeprazole (PRILOSEC) 20 mg capsule Take 20 mg by mouth two (2) times daily (with meals).  albuterol sulfate (PROVENTIL;VENTOLIN) 2.5 mg/0.5 mL Nebu 2.5 mg by Nebulization route once as needed.  sertraline (ZOLOFT) 100 mg tablet Take 100 mg by mouth daily.  triamcinolone acetonide (KENALOG) 0.1 % ointment Apply  to affected area two (2) times a day. use thin layer      No current facility-administered medications for this visit.         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    HX KNEE ARTHROSCOPY  5/26/2011    Right knee with partial medial meniscectomy of right knee    HX ORTHOPAEDIC      left thumb joint     HX TONSILLECTOMY      MO RMVL LUNG OTHER THAN PNEUMONECTOMY 1 LOBE LOBECT      right for cancer in 2001 - no chemo        SOCIAL HISTORY:     Social History     Socioeconomic History    Marital status:      Spouse name: Not on file    Number of children: Not on file    Years of education: Not on file    Highest education level: Not on file   Occupational History    Not on file   Social Needs    Financial resource strain: Not on file    Food insecurity:     Worry: Not on file     Inability: Not on file    Transportation needs:     Medical: Not on file     Non-medical: Not on file   Tobacco Use    Smoking status: Never Smoker    Smokeless tobacco: Never Used   Substance and Sexual Activity    Alcohol use: No    Drug use: No    Sexual activity: Not on file   Lifestyle    Physical activity:     Days per week: Not on file     Minutes per session: Not on file    Stress: Not on file   Relationships    Social connections:     Talks on phone: Not on file     Gets together: Not on file     Attends Rastafarian service: Not on file     Active member of club or organization: Not on file     Attends meetings of clubs or organizations: Not on file     Relationship status: Not on file    Intimate partner violence:     Fear of current or ex partner: Not on file     Emotionally abused: Not on file     Physically abused: Not on file     Forced sexual activity: Not on file   Other Topics Concern    Not on file   Social History Narrative    Not on file       FAMILY HISTORY:     Family History   Problem Relation Age of Onset    Hypertension Father     Asthma Father     Arthritis-osteo Father          RADIOGRAPHS & DIAGNOSTIC STUDIES     . XR Results (most recent):  Results from Hospital Encounter encounter on 06/14/19   XR ANKLE RT MIN 3 V    Narrative EXAM: XR ANKLE RT MIN 3 V    INDICATION: Pain    COMPARISON: None. FINDINGS: Three views of the right ankle demonstrate a nondisplaced fracture of  the medial malleolus. The ankle mortise is symmetric and intact. The distal  fibula is unremarkable. Soft tissue swelling is seen over the medial malleolus. .  No other osseous, articular or soft tissue abnormalities are identified. Impression IMPRESSION:  Nondisplaced fracture right medial malleolus. The ankle mortise is symmetric and intact. Soft tissue swelling over the medial malleolus.  Nicole Johansen PA-C  6/18/2019   9:04 PM

## 2019-06-18 NOTE — PATIENT INSTRUCTIONS
Wear CAM boot and instructed - no weight bearing. Do not drive in the CAM boot  Follow RICE protocol listed below  Follow up next week          Learning About RICE (Rest, Ice, Compression, and Elevation)  What is RICE? RICE is a way to care for an injury. RICE helps relieve pain and swelling. It may also help with healing and flexibility. RICE stands for:  · Rest and protect the injured or sore area. · Ice or a cold pack used as soon as possible. · Compression, or wrapping the injured or sore area with an elastic bandage. · Elevation (propping up) the injured or sore area. How do you do RICE? You can use RICE for home treatment when you have general aches and pains or after an injury or surgery. Rest  · Do not put weight on the injury for at least 24 to 48 hours. · Use crutches for a badly sprained knee or ankle. · Support a sprained wrist, elbow, or shoulder with a sling. Ice  · Put ice or a cold pack on the injury right away to reduce pain and swelling. Frozen vegetables will also work as an ice pack. Put a thin cloth between the ice or cold pack and your skin. The cloth protects the injured area from getting too cold. · Use ice for 10 to 15 minutes at a time for the first 48 to 72 hours. Compression  · Use compression for sprains, strains, and surgeries of the arms and legs. · Wrap the injured area with an elastic bandage or compression sleeve to reduce swelling. · Don't wrap it too tightly. If the area below it feels numb, tingles, or feels cool, loosen the wrap. Elevation  · Use elevation for areas of the body that can be propped up, such as arms and legs. · Prop up the injured area on pillows whenever you use ice. Keep it propped up anytime you sit or lie down. · Try to keep the injured area at or above the level of your heart. This will help reduce swelling and bruising. Where can you learn more? Go to http://farhat.info/.   Enter R744 in the search box to learn more about \"Learning About RICE (Rest, Ice, Compression, and Elevation). \"  Current as of: September 20, 2018  Content Version: 11.9  © 3737-7485 [employer]. Care instructions adapted under license by [employer] (which disclaims liability or warranty for this information). If you have questions about a medical condition or this instruction, always ask your healthcare professional. [address] any warranty or liability for your use of this information. Broken Ankle: Care Instructions  Your Care Instructions    An ankle may break (fracture) during sports, a fall, or other accidents. Fractures can range from a small, hairline crack, to a bone or bones broken into two or more pieces. Your treatment depends on how bad the break is. Your doctor may have put your ankle in a splint or cast to allow it to heal or to keep it stable until you see another doctor. It may take weeks or months for your ankle to heal. You can help your ankle heal with some care at home. You heal best when you take good care of yourself. Eat a variety of healthy foods, and don't smoke. You may have had a sedative to help you relax. You may be unsteady after having sedation. It can take a few hours for the medicine's effects to wear off. Common side effects of sedation include nausea, vomiting, and feeling sleepy or tired. The doctor has checked you carefully, but problems can develop later. If you notice any problems or new symptoms,  get medical treatment right away. Follow-up care is a key part of your treatment and safety. Be sure to make and go to all appointments, and call your doctor if you are having problems. It's also a good idea to know your test results and keep a list of the medicines you take. How can you care for yourself at home? · If the doctor gave you a sedative:  ? For 24 hours, don't do anything that requires attention to detail.  It takes time for the medicine's effects to completely wear off.  ? For your safety, do not drive or operate any machinery that could be dangerous. Wait until the medicine wears off and you can think clearly and react easily. · Put ice or a cold pack on your ankle for 10 to 20 minutes at a time. Try to do this every 1 to 2 hours for the next 3 days (when you are awake). Put a thin cloth between the ice and your cast or splint. Keep your cast or splint dry. · Follow the cast care instructions your doctor gives you. If you have a splint, do not take it off unless your doctor tells you to. · Be safe with medicines. Take pain medicines exactly as directed. ? If the doctor gave you a prescription medicine for pain, take it as prescribed. ? If you are not taking a prescription pain medicine, ask your doctor if you can take an over-the-counter medicine. · Prop up your leg on pillows in the first few days after the injury. Keep the ankle higher than the level of your heart. This will help reduce swelling. · Do not put weight on your ankle unless your doctor tells you to. Use crutches to walk. · Follow instructions for exercises to keep your leg strong. · Wiggle your toes often to reduce swelling and stiffness. When should you call for help? Call 911 anytime you think you may need emergency care. For example, call if:    · You have chest pain, are short of breath, or you cough up blood.     · You are very sleepy and you have trouble waking up.    Call your doctor now or seek immediate medical care if:    · You have new or worse nausea or vomiting.     · You have new or worse pain.     · Your foot is cool or pale or changes color.     · You have tingling, weakness, or numbness in your toes.     · Your cast or splint feels too tight.     · You have signs of a blood clot in your leg (called a deep vein thrombosis), such as:  ? Pain in your calf, back of the knee, thigh, or groin. ?  Redness or swelling in your leg.    Watch closely for changes in your health, and be sure to contact your doctor if:    · You have a problem with your splint or cast.     · You do not get better as expected. Where can you learn more? Go to http://ravi-salvador.info/. Enter P763 in the search box to learn more about \"Broken Ankle: Care Instructions. \"  Current as of: September 20, 2018  Content Version: 11.9  © 7005-7018 [email or web address]. Care instructions adapted under license by [employer] (which disclaims liability or warranty for this information). If you have questions about a medical condition or this instruction, always ask your healthcare professional. [address] any warranty or liability for your use of this information.

## 2019-06-25 ENCOUNTER — OFFICE VISIT (OUTPATIENT)
Dept: ORTHOPEDIC SURGERY | Age: 69
End: 2019-06-25

## 2019-06-25 VITALS
TEMPERATURE: 98.1 F | WEIGHT: 273 LBS | SYSTOLIC BLOOD PRESSURE: 144 MMHG | OXYGEN SATURATION: 98 % | BODY MASS INDEX: 33.94 KG/M2 | DIASTOLIC BLOOD PRESSURE: 61 MMHG | HEART RATE: 60 BPM | HEIGHT: 75 IN

## 2019-06-25 DIAGNOSIS — S82.54XD CLOSED NONDISPLACED FRACTURE OF MEDIAL MALLEOLUS OF RIGHT TIBIA WITH ROUTINE HEALING, SUBSEQUENT ENCOUNTER: Primary | ICD-10-CM

## 2019-06-25 DIAGNOSIS — Z01.818 PRE-OP EXAM: ICD-10-CM

## 2019-06-25 DIAGNOSIS — M25.571 ACUTE RIGHT ANKLE PAIN: ICD-10-CM

## 2019-06-25 DIAGNOSIS — B35.3 TINEA PEDIS OF RIGHT FOOT: ICD-10-CM

## 2019-06-25 RX ORDER — TOLNAFTATE 10 MG/G
CREAM TOPICAL 2 TIMES DAILY
Qty: 15 G | Refills: 0 | Status: SHIPPED | OUTPATIENT
Start: 2019-06-25 | End: 2019-06-27

## 2019-06-25 NOTE — PATIENT INSTRUCTIONS
Dr. Tirso Luong Pre-operative Instructions:      Patient: John Santillan   :  1950     I understand I am to stop taking oral birth control pills, hormonal replacement therapy and all Aspirin, Aspirin containing medications, Non-Steroidal Anti-Inflammatory medications (such as Advil, Aleve, Motrin, Ibuprofen) and or Blood thinner medication such as Coumadin, Plavix, Heparin or others 5 days prior to surgery. I understand I am to STOP taking these Medications 5 days prior to surgery:  I am to get instructions from my prescribing physician. 1. __As listed above_______________________  2. _____________________________________  3. _____________________________________  4. _____________________________________    I understand that if I am taking daily medications for high blood pressure, I can take them the morning of surgery with a small sip of water. I will consult my prescribing physician or call [facility] with specific questions. I also understand that:     I am to report important observations or changes that may occur prior to surgery. If I have any changes in my physical condition, such as a rash, a fever, sore throat, abscess, ulcers, nausea, vomiting, or diarrhea. I am to call the office and I am to consult my primary care physician to assess and treat the problem.  I am not to eat or drink anything after midnight the night before my surgery.  I am not to drink alcoholic beverages 24 hours prior to surgery.  I am not to do any illegal drugs prior to surgery.  I am not to smoke at least 24 hours prior to surgery.  I am able and will shower or bathe before surgery. I will use the Hibiclens solution on my surgical site only. The hibiclens directions are one packet a day starting two days before surgery.  I will remove any nail polish, make-up or jewelry prior to arriving for my surgery.       If I wear glasses, contact lenses or dentures they must be removed prior to going to the operating room.  All body piercing and artifical eye-lashes must be removed prior to surgery     I will not wear any aerosol sprays, perfumes or skin creams.  I am to make arrangements for a family member or friend to accompany me to surgery and take me home after my surgery as I will not be allowed to leave the hospital alone. A cab or bus will not be acceptable. Please make arrange for someone to stay with you for 24 hours after surgery.  Patient has expressed understanding of the diagnosis, treatment and planned surgery        Surgery: What to Expect at [address] [place] [address]  This care sheet gives you a general idea about how long it will take for you to recover from your surgery. But each person recovers at a different pace. How can you care for yourself at home? Activity  · Allow your body to heal. Don't move quickly or lift anything heavy until you are feeling better. · Rest when you feel tired. · Your doctor may give you specific instructions on when you can do your normal activities again, such as driving and going back to work. · Be active. Walking is a good choice. Diet  · You can eat your normal diet when you feel well. If your stomach is upset, try bland, low-fat foods like plain rice, broiled chicken, toast, and yogurt. · If your bowel movements are not regular right after surgery, try to avoid constipation and straining. Drink plenty of water. Your doctor may suggest fiber, a stool softener, or a mild laxative. Medicines  · Your doctor will tell you if and when you can restart your medicines. He or she will also give you instructions about taking any new medicines. · If you take blood thinners, such as warfarin (Coumadin), clopidogrel (Plavix), or aspirin, be sure to talk to your doctor. He or she will tell you if and when to start taking those medicines again. Make sure that you understand exactly what your doctor wants you to do. · Be safe with medicines. Read and follow all instructions on the label. ¨ If the doctor gave you a prescription medicine for pain, take it as prescribed. ¨ If you are not taking a prescription pain medicine, ask your doctor if you can take an over-the-counter medicine. Incision care  · You will have a dressing over the cut (incision). A dressing helps the incision heal and protects it. Your doctor will tell you how to take care of this. · If you have strips of tape on the cut the doctor made, leave the tape on for a week or until it falls off. · If you had stitches, your doctor will tell you when to come back to have them removed. · If you have skin adhesive on the cut, leave it on until it falls off. Skin adhesive is also called liquid stitches. · Change the bandage every day. · Wash the area daily with warm, soapy water, and pat it dry. Don't use hydrogen peroxide or alcohol. They can slow healing. · You may cover the area with a gauze bandage if it oozes fluid or rubs against clothing. · You may shower 24 to 48 hours after surgery. Pat the incision dry. Don't swim or take a bath for the first 2 weeks, or until your doctor tells you it is okay. Follow-up care is a key part of your treatment and safety. Be sure to make and go to all appointments, and call your doctor if you are having problems. It's also a good idea to know your test results and keep a list of the medicines you take. When should you call for help? Call 911 anytime you think you may need emergency care. For example, call if:  · You passed out (lost consciousness). · You have severe trouble breathing. · You have sudden chest pain and shortness of breath, or you cough up blood. Call your doctor now or seek immediate medical care if:  · You have pain that does not get better after you take pain medicine. · You have loose stitches, or your incision comes open. · You are bleeding through your dressing.  A small amount of blood is normal.  · You have signs of infection, such as:  ¨ Increased pain, swelling, warmth, or redness. ¨ Red streaks leading from the incision. ¨ Pus draining from the incision. ¨ A fever. · You have symptoms of a blood clot in your arm or leg (called a deep vein thrombosis). These may include:  ¨ Pain in your calf, back of the knee, thigh, or groin. ¨ Redness and swelling in the arm, leg, or groin. Watch closely for any changes in your health, and be sure to contact your doctor if:  · You do not have a bowel movement after taking a laxative. Where can you learn more? Go to http://ravi-salvador.info/  Enter U456 in the search box to learn more about \"Surgery: What to Expect at Home. \"  © 3893-7257 [employer]. Care instructions adapted under license by [employer] (which disclaims liability or warranty for this information). This care instruction is for use with your licensed healthcare professional. If you have questions about a medical condition or this instruction, always ask your healthcare professional. [address] any warranty or liability for your use of this information. Content Version: 73.8.467182; Current as of: November 20, 2015          Acute Pain After Surgery: Care Instructions  Your Care Instructions      It's common to have some pain after surgery. Pain doesn't mean that something is wrong or that the surgery didn't go well. But when the pain is severe, it's important to work with your doctor to manage it. It's also important to be aware of a few facts about pain and pain medicine. · You are the only person who knows what your pain feels like. So be sure to tell your doctor when you are in pain or when the pain changes. Then he or she will know how to adjust your medicines. · Pain is often easier to control right after it starts. So it may be better to take regular doses of pain medicine and not wait until the pain gets bad. · Medicine can help control pain. But this doesn't mean you'll have no pain. Medicine works to keep the pain at a level you can live with. With time, you will feel better. Follow-up care is a key part of your treatment and safety. Be sure to make and go to all appointments, and call your doctor if you are having problems. It's also a good idea to know your test results and keep a list of the medicines you take. How can you care for yourself at home? · Be safe with medicines. Read and follow all instructions on the label. ¨ If the doctor gave you a prescription medicine for pain, take it as prescribed. ¨ If you are not taking a prescription pain medicine, ask your doctor if you can take an over-the-counter medicine. · If you take an over-the-counter pain medicine, such as acetaminophen (Tylenol), ibuprofen (Advil, Motrin), or naproxen (Aleve), read and follow all instructions on the label. · Do not take two or more pain medicines at the same time unless the doctor told you to. · Do not drink alcohol while you are taking pain medicines. · Try to walk each day if your doctor recommends it. Start by walking a little more than you did the day before. Bit by bit, increase the amount you walk. Walking increases blood flow. It also helps prevent pneumonia and constipation. · To prevent constipation from opioid pain medicines:  ¨ Talk to your doctor about a laxative. ¨ Include fruits, vegetables, beans, and whole grains in your diet each day. These foods are high in fiber. ¨ Drink plenty of fluids, enough so that your urine is light yellow or clear like water. Drink water, fruit juice, or other drinks that do not contain caffeine or alcohol. If you have kidney, heart, or liver disease and have to limit fluids, talk with your doctor before you increase the amount of fluids you drink. ¨ Take a fiber supplement, such as Citrucel or Metamucil, every day if needed. Read and follow all instructions on the label.  If you take pain medicine for more than a few days, talk to your doctor before you take fiber. When should you call for help? Call your doctor now or seek immediate medical care if:  ? · Your pain gets worse. ? · Your pain is not controlled by medicine. ? Watch closely for changes in your health, and be sure to contact your doctor if you have any problems. Where can you learn more? Go to http://ravi-salvador.info/. Enter (64) 768-480 in the search box to learn more about \"Acute Pain After Surgery: Care Instructions. \"  Current as of: March 20, 2017  Content Version: 11.4  © 5615-3653 [employer]. Care instructions adapted under license by [employer] (which disclaims liability or warranty for this information). If you have questions about a medical condition or this instruction, always ask your healthcare professional. [address] any warranty or liability for your use of this information.

## 2019-06-25 NOTE — H&P (VIEW-ONLY)
FOOT AND ANKLE HISTORY AND PHYSICAL Patient: Flor Marin                   MRN: [id number]         SSN: [id number] YOB: 1950                     AGE: 79 y.o. SEX: male Patient scheduled for:  Open reduction internal fixation of right medial malleolar fracture, possible bone grafting Date of surgery: 6/28/19 [facility] Surgeon: Darwin Del Cid MD 
ANESTHESIA TYPE:  General,  Popliteal block PRESCRIPTIONS AND/OR ORDERS PROVIDED DURING H&P: 
 
Orders Placed This Encounter  AMB SUPPLY ORDER  
 CULTURE, URINE  
 [81590] Ankle Min 3V  CT ANKLE RT WO  CONT  XR CHEST PA LAT  CBC WITH AUTOMATED DIFF  
 METABOLIC PANEL, COMPREHENSIVE  
 URINALYSIS W/ RFLX MICROSCOPIC  
 PROTHROMBIN TIME + INR  
 EKG, 12 LEAD, INITIAL  tolnaftate (TINACTIN) 1 % topical cream  
  
  
  
 
HISTORY:  
 
The patient was seen in the office today for a preoperative history and physical for an upcoming above listed surgery. The patient is a pleasant 79 y.o. male who has a history of a right medial malleolar fracture which was sustained on 6/9/19. Patient did not seek medical attention until 6/14/19. Patient states that he miss-stepping as he came off a ladder stepping down on a metal plate and rolled his ankle. Patient states that he felt/heard a pop in his ankle. When his condition did not improve over the next 6 days, he presented to the  ED on 6/14/19. X-rays were performed which revealed a non-displaced medial malleolar fracture. Patient was placed in a posterior splint, given crutches and advised to follow up with our office. He has had continued pain and swelling to the medial aspect of his ankle.  He describes the pain as throbbing pulsing and is rated 10/10 when he attempts to ambulate without the crutches.  Pain worsens when he is ambulating and feels better when he is at rest. He denies any radiating pain.  
  
 Due to the current findings, affected activity of daily living and continued pain and discomfort, surgical intervention is indicated. The alternatives, risks, and complications, including but not limited to infection, blood loss, need for blood transfusion, neurovascular damage, coty-incisional numbness, subcutaneous hematoma, bone fracture, anesthetic complications, DVT, PE, death, RSD, postoperative stiffness and pain, possible surgical scar, delayed healing and nonhealing, reflexive sympathetic dystrophy, damage to blood vessels and nerves, need for more surgery, MI, and stroke have been discussed. The patient understands and wishes to proceed with surgery. Patient will require clearance from his PCP and Cardiology. Patient states that he has had a history of right lung lobectomy in 1990 and he occasionally has some SOB when he gets warm. He also has a hiatal hernia, and history of anxiety. Patient states the he has a history of blood clots in his left arm and hands about 6-7 years ago and was placed on Coumadin for one year. He has had a cardiac cath procedure in the past and is followed by Dr. Ham Coppola. PAST MEDICAL HISTORY:  
 
Past Medical History:  
Diagnosis Date  Anxiety  Arthritis  Bilateral leg edema  Chronic obstructive pulmonary disease ([facility] [address]  CTS (carpal tunnel syndrome)  DJD (degenerative joint disease) of knee Bilateral; endstage degenerative arthritis of right knee  Gastric ulcer, unspecified as acute or chronic, without mention of hemorrhage or perforation   
 patient listed stomach problems or ulcers  GERD (gastroesophageal reflux disease)  Hiatal hernia  History of blood clots 2012 Treated with Coumadin for 1 year  Hypertension  Knee pain  Lumbar pain  Medial meniscus tear May 2011 Right knee  SOB (shortness of breath)  Venous stasis of lower extremity CURRENT MEDICATIONS:  
 
Current Outpatient Medications Medication Sig Dispense Refill  tolnaftate (TINACTIN) 1 % topical cream Apply  to affected area two (2) times a day. 15 g 0  
 promethazine (PHENERGAN) 25 mg tablet Take 1 Tab by mouth every six (6) hours as needed for Nausea. 12 Tab 0  
 amLODIPine (NORVASC) 10 mg tablet Take 1 Tab by mouth daily. 30 Tab 0  
 terazosin (HYTRIN) 10 mg capsule Take 10 mg by mouth nightly.  mometasone-formoterol (DULERA) 200-5 mcg/actuation HFA inhaler Take 2 Puffs by inhalation two (2) times a day.  tiZANidine (ZANAFLEX) 4 mg tablet Take 2-3 Tabs by mouth three (3) times daily as needed (spasm). Dose change 720 Tab 5  DIPHENHYDRAMINE HCL (BENADRYL ALLERGY PO) Take  by mouth.  ergocalciferol (VITAMIN D) 50,000 unit capsule Take 50,000 Units by mouth as directed.  buPROPion SR (WELLBUTRIN SR) 150 mg SR tablet Take  by mouth two (2) times a day.  magnesium oxide (MAG-OX) 400 mg tablet Take 400 mg by mouth daily.  omeprazole (PRILOSEC) 20 mg capsule Take 20 mg by mouth two (2) times daily (with meals).  albuterol sulfate (PROVENTIL;VENTOLIN) 2.5 mg/0.5 mL Nebu 2.5 mg by Nebulization route once as needed.  sertraline (ZOLOFT) 100 mg tablet Take 100 mg by mouth daily.  triamcinolone acetonide (KENALOG) 0.1 % ointment Apply  to affected area two (2) times a day. use thin layer ALLERGIES:  
 
Allergies Allergen Reactions  Aspirin Other (comments) Stomach bleed  Benazepril Angioedema  Metoprolol Other (comments) Sexual dysfunction  Pcn [Penicillins] Rash SURGICAL HISTORY:  
 
Past Surgical History:  
Procedure Laterality Date  HX HEART CATHETERIZATION    
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  
 partial  
 HX ORTHOPAEDIC    
 left thumb joint  HX TONSILLECTOMY  MD RMVL LUNG OTHER THAN PNEUMONECTOMY 1 LOBE LOBECT    
 right for cancer in 2001 - no chemo SOCIAL HISTORY:  
 
Social History Socioeconomic History  Marital status:  Spouse name: Not on file  Number of children: Not on file  Years of education: Not on file  Highest education level: Not on file Tobacco Use  Smoking status: Never Smoker  Smokeless tobacco: Never Used Substance and Sexual Activity  Alcohol use: No  
 Drug use: No  
 
 
FAMILY HISTORY:  
 
Family History Problem Relation Age of Onset  Hypertension Father  Asthma Father  Arthritis-osteo Father REVIEW OF SYSTEMS:  
 
Negative for fevers, chills, chest pain, shortness of breath, weight loss, recent illness General: Negative for fever and chills. No unexpected change in weight. Denies fatigue. No change in appetite. Skin: Negative for rash or itching. HEENT: Negative for congestion, sore throat, neck pain and neck stiffness. No change in vision or hearing. Hasn't noted any enlarged lymph nodes in the neck. Cardiovascular:  Negative for chest pain and palpitations. Has not noted pedal edema. Respiratory: Negative for cough, colds, sinus, hemoptysis, shortness of breath and wheezing. Gastrointestinal: Negative for nausea and vomiting, rectal bleeding, coffee ground emesis, abdominal pain, diarrhea and constipation. Genitourinary: Negative for dysuria, frequency urgency, or burning on micturition. No flank pain, no foul smelling urine, no difficulty with initiating urination. Hematological: Negative for bleeding or easy bruising. Musculoskeletal: Negative  for arthralgias, back pain or neck pain. Neurological: Negative for dizziness, seizures or syncopal episodes. Denies headaches. Endocrine: Denies excessive thirst.  No heat/cold intolerance. Psychiatric: Negative for depression or insomnia. PHYSICAL EXAMINATION:  
 
VITALS:  
Visit Vitals /61 Pulse 60  
 Temp 98.1 °F (36.7 °C) (Oral) Ht 6' 3\" (1.905 m) Wt 273 lb (123.8 kg) SpO2 98% BMI 34.12 kg/m² GEN:  Well developed, well nourished 79 y.o. male in no acute distress. PSYCH: Alert an oriented to person, place and time. Mood, memory, affect, behavior and judgment normal 
HEENT: Normocephalic and atraumatic. Eyes: Conjunctivae and EOM are normal.Pupils are equal, round, and reactive to light. External ear normal appearance, external nose normal appearing. Mouth/Throat: Oropharynx is clear and moist, able to handle oral secretions w/out difficulty, airway patent. NECK: Supple. Normal ROM, No lymphadenopathy. Trachea is midline. No bruising, swelling or deformity RESP: Clear to auscultation bilaterally. No wheezes, rales, rhonchi. Normal effort and breath sounds. No respiratory distress CARDIO: Bradycardia noted. No MGR. ABDOMEN: Soft, non-tender, non-distended, normoactive bowel sounds in all four quadrants. There is no tenderness. There is no rebound and no guarding. BACK: No CVA or spinal tenderness BREAST:  Deferred PELVIC:    Deferred RECTAL:  Deferred :           Deferred EXTREMITIES: EXAMINATION OF:  ANKLE/FOOT right 
  
Gait: in CAM boot, using crutches Tenderness: Tenderness to the medial and lateral ankle is present Cutaneous: Discoloration to the lower leg indicative of venous stasis disease. Skin irritation in the 1st web space and on the #2 toe . Joint Motion: Not tested at this time. Joint / Tendon Stability: No Ankle or Subtalar instability or joint laxity. No peroneal sublux ability or dislocation Alignment: Forefoot, Midfoot, Hindfoot WNL. Varus alignment of the knee. Neuro Motor/Sensory: NL/NL. Vascular: NL foot/ankle pulses. Numerous varicose veins to the lower legs. 2+ pitting edema. Decreased monofilament testing. Lymphatics: No extremity lymphedema, No calf swelling, no tenderness to calf muscles. RADIOGRAPHS & DIAGNOSTIC STUDIES:  
 
XR Results (most recent): 
Results from Hospital Encounter encounter on 06/14/19 XR ANKLE RT MIN 3 V Narrative EXAM: XR ANKLE RT MIN 3 V 
 
INDICATION: Pain COMPARISON: None. FINDINGS: Three views of the right ankle demonstrate a nondisplaced fracture of 
the medial malleolus. The ankle mortise is symmetric and intact. The distal 
fibula is unremarkable. Soft tissue swelling is seen over the medial malleolus. Veena Sonam No other osseous, articular or soft tissue abnormalities are identified. Impression IMPRESSION:  Nondisplaced fracture right medial malleolus. The ankle mortise is symmetric and intact. Soft tissue swelling over the medial malleolus. Veena Sonam LABS:  
Pending ASSESSMENT:  
 
 
Encounter Diagnoses Name Primary?  Closed nondisplaced fracture of medial malleolus of right tibia with routine healing, subsequent encounter Yes  Acute right ankle pain  Tinea pedis of right foot  Pre-op exam   
 
 
PLAN:  
 
Again, the alternatives, risks, and complications, as well as expected outcome were discussed. The patient understands and agrees to proceed with the above listed surgery pending completion of preoperative labs and clearance from patients PCP and cardiology. Patient has been given Hibiclens wash with instructions and/or prescriptions or orders listed above.  
 
Jie Gaviria PA-C 
6/25/2019 
4:06 PM

## 2019-06-25 NOTE — PROGRESS NOTES
AMBULATORY PROGRESS NOTE      Patient: Jn Anderson             MRN: [id number]     SSN: [id number] Body mass index is 34.12 kg/m². YOB: 1950     AGE: 79 y.o. SEX: male    PCP: Mila Mercado MD     IMPRESSION/DIAGNOSIS AND TREATMENT PLAN     DIAGNOSES  1. Closed nondisplaced fracture of medial malleolus of right tibia with routine healing, subsequent encounter    2. Acute right ankle pain    3. Tinea pedis of right foot    4. Pre-op exam        Orders Placed This Encounter    AMB SUPPLY ORDER    CULTURE, URINE    [94270] Ankle Min 3V    CT ANKLE RT WO  CONT    XR CHEST PA LAT    CBC WITH AUTOMATED DIFF    METABOLIC PANEL, COMPREHENSIVE    URINALYSIS W/ RFLX MICROSCOPIC    PROTHROMBIN TIME + INR    EKG, 12 LEAD, INITIAL    tolnaftate (TINACTIN) 1 % topical cream      Jn Anderson understands his diagnoses and the proposed plan. Plan:    1) Tinactin 1% topical cream: BID; 1 bottle, 0 refills. 2) CT scan without IV Contrast of the right ankle. 3) Continue using the CAM walker boot as directed. 4) Continue to remain NWB to the RLE with the boot and crutches as directed. 5) Continue activity modification as directed. 6) Pre-Op Labs: CBC w/ diff, CMP, PT INR, CXR, EKG, UA w/ reflex, Urine Culture. RTO - after CT     HPI AND EXAMINATION     Michele Harp IS A 79 y.o. male who presents to my outpatient office for follow up of a closed fracture of the right ankle. At the last visit, Samreen Andino PA-C, provided an order for a short right CAM walker boot, instructed the patient to continue activity modification as directed, to remain NWB to the RLE, and to use the RICE protocol. Since we saw him last, Mr. Melissa Mojica reports that he has not been experiencing too much pain. He recalls that he fractured his ankle in the beginning of July when he missed a step while climbing down a ladder.  He continued to walk on his ankle for two weeks, because he thought he just sprained his ankle, however, he was seen at the  ED on July 14th due to persistent pain. He was diagnosed with an ankle fracture on July 14th and was given crutches and placed in a splint. The patient has h/o nerve damage in his back. Date of injury: 06/01/2019. Visit Vitals  /61   Pulse 60   Temp 98.1 °F (36.7 °C) (Oral)   Ht 6' 3\" (1.905 m)   Wt 273 lb (123.8 kg)   SpO2 98%   BMI 34.12 kg/m²     Appearance: Alert, well appearing and pleasant patient who is in no distress, oriented to person, place/time, and who follows commands. This patient is accompanied in the examination room by his wife. Dementia: no dementia  Psychiatric: Affect and mood are appropriate. Patient arrives to office via: with assistive device: CAM boot and crutches  HEENT: Head normocephalic & atraumatic. Both pupils are round, non icteric sclera   Eye: EOM are intact and sclera are clear    Neck: ROM WNL and JVD neck is not present     Hearings Intact, does not require hearing aid device  Respiratory: Breathing is unlabored without accessory chest muscle use  Cardiovascular/Peripheral Vascular: Normal Pulses to each foot    ANKLE/FOOT right    Gait: in CAM boot, using crutches  Tenderness: Tenderness to the medial and lateral ankle is present  Cutaneous: Discoloration to the lower leg indicative of venous stasis disease. Skin irritation in the 1st web space and on the #2 toe . Joint Motion: Not tested at this time. Joint / Tendon Stability: No Ankle or Subtalar instability or joint laxity. No peroneal sublux ability or dislocation  Alignment: Forefoot, Midfoot, Hindfoot WNL. Varus alignment of the knee. Neuro Motor/Sensory: NL/NL. Vascular: NL foot/ankle pulses. Numerous varicose veins to the lower legs. 2+ pitting edema. Decreased monofilament testing. Lymphatics: No extremity lymphedema, No calf swelling, no tenderness to calf muscles.     CHART REVIEW     Past Medical History: Diagnosis Date    Anxiety     Arthritis     Bilateral leg edema     Chronic obstructive pulmonary disease (HCC)     CTS (carpal tunnel syndrome)     DJD (degenerative joint disease) of knee     Bilateral; endstage degenerative arthritis of right knee    Gastric ulcer, unspecified as acute or chronic, without mention of hemorrhage or perforation     patient listed stomach problems or ulcers    GERD (gastroesophageal reflux disease)     Hiatal hernia     History of blood clots 2012    Treated with Coumadin for 1 year    Hypertension     Knee pain     Lumbar pain     Medial meniscus tear May 2011    Right knee    SOB (shortness of breath)     Venous stasis of lower extremity      Current Outpatient Medications   Medication Sig    tolnaftate (TINACTIN) 1 % topical cream Apply  to affected area two (2) times a day.  promethazine (PHENERGAN) 25 mg tablet Take 1 Tab by mouth every six (6) hours as needed for Nausea.  amLODIPine (NORVASC) 10 mg tablet Take 1 Tab by mouth daily.  terazosin (HYTRIN) 10 mg capsule Take 10 mg by mouth nightly.  mometasone-formoterol (DULERA) 200-5 mcg/actuation HFA inhaler Take 2 Puffs by inhalation two (2) times a day.  tiZANidine (ZANAFLEX) 4 mg tablet Take 2-3 Tabs by mouth three (3) times daily as needed (spasm). Dose change    DIPHENHYDRAMINE HCL (BENADRYL ALLERGY PO) Take  by mouth.  ergocalciferol (VITAMIN D) 50,000 unit capsule Take 50,000 Units by mouth as directed.  buPROPion SR (WELLBUTRIN SR) 150 mg SR tablet Take  by mouth two (2) times a day.  magnesium oxide (MAG-OX) 400 mg tablet Take 400 mg by mouth daily.  omeprazole (PRILOSEC) 20 mg capsule Take 20 mg by mouth two (2) times daily (with meals).  albuterol sulfate (PROVENTIL;VENTOLIN) 2.5 mg/0.5 mL Nebu 2.5 mg by Nebulization route once as needed.  sertraline (ZOLOFT) 100 mg tablet Take 100 mg by mouth daily.     triamcinolone acetonide (KENALOG) 0.1 % ointment Apply  to affected area two (2) times a day. use thin layer      No current facility-administered medications for this visit. 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    HX KNEE ARTHROSCOPY  5/26/2011    Right knee with partial medial meniscectomy of right knee    HX LOBECTOMY Right 1990    partial    HX ORTHOPAEDIC      left thumb joint     HX TONSILLECTOMY      AR RMVL LUNG OTHER THAN PNEUMONECTOMY 1 LOBE LOBECT      right for cancer in 2001 - no chemo      Social History     Occupational History    Not on file   Tobacco Use    Smoking status: Never Smoker    Smokeless tobacco: Never Used   Substance and Sexual Activity    Alcohol use: No    Drug use: No    Sexual activity: Not on file     Family History   Problem Relation Age of Onset    Hypertension Father     Asthma Father     Arthritis-osteo Father         REVIEW OF SYSTEMS : 6/25/2019  ALL BELOW ARE Negative except : SEE HPI     Constitutional: Negative for fever, chills and weight loss. Neg Weight Loss  Cardiovascular: Negative for chest pain, claudication and leg swelling. SOB, SHAH   Gastrointestinal/Urological: Negative for  pain, N/V/D/C, Blood in stool or urine,dysuria                         Hematuria, Incontinence, pelvic pain  Musculoskeletal: see HPI. Neurological: Negative for dizziness and weakness, headaches,Visual Changes             Confusion,  Or Seizures,   Psychiatric/Behavioral: Negative for depression, memory loss and substance abuse. Extremities:  Negative for hair changes, rash or skin lesion changes. Hematologic: Negative for Bleeding problems, bruising, pallor or swollen lymph nodes.   Peripheral Vascular: No calf pain, vascular vein tenderness to calf pain              No calf throbbing, posterior knee throbbing pain     DIAGNOSTIC IMAGING     No notes on file    XR Results (most recent):  Results from Hospital Encounter encounter on 06/14/19   XR ANKLE RT MIN 3 V    Narrative EXAM: XR ANKLE RT MIN 3 V    INDICATION: Pain    COMPARISON: None. FINDINGS: Three views of the right ankle demonstrate a nondisplaced fracture of  the medial malleolus. The ankle mortise is symmetric and intact. The distal  fibula is unremarkable. Soft tissue swelling is seen over the medial malleolus. .  No other osseous, articular or soft tissue abnormalities are identified. Impression IMPRESSION:  Nondisplaced fracture right medial malleolus. The ankle mortise is symmetric and intact. Soft tissue swelling over the medial malleolus. .                Please see above section of this report. I have personally reviewed the results of the above study. The interpretation of this study is my professional opinion. Written by Omar Bethea, as dictated by Dr. Paris Roper. I, Dr. Paris Roper, confirm that all documentation is accurate.

## 2019-06-25 NOTE — H&P
FOOT AND ANKLE HISTORY AND PHYSICAL      Patient: Case Dawson                   MRN: [id number]         SSN: [id number]  YOB: 1950                     AGE: 79 y.o. SEX: male    Patient scheduled for:  Open reduction internal fixation of right medial malleolar fracture, possible bone grafting   Date of surgery: 6/28/19   Location of Surgery: [facility]   Surgeon: Denae Hall. MD Nehemias  ANESTHESIA TYPE:  General,  Popliteal block          PRESCRIPTIONS AND/OR ORDERS PROVIDED DURING H&P:    Orders Placed This Encounter    AMB SUPPLY ORDER    CULTURE, URINE    [[id number]] Ankle Min 3V    CT ANKLE RT WO  CONT    XR CHEST PA LAT    CBC WITH AUTOMATED DIFF    METABOLIC PANEL, COMPREHENSIVE    URINALYSIS W/ RFLX MICROSCOPIC    PROTHROMBIN TIME + INR    EKG, 12 LEAD, INITIAL    tolnaftate (TINACTIN) 1 % topical cream              HISTORY:     The patient was seen in the office today for a preoperative history and physical for an upcoming above listed surgery. The patient is a pleasant 79 y.o. male who has a history of a right medial malleolar fracture which was sustained on 6/9/19. Patient did not seek medical attention until 6/14/19. Patient states that he miss-stepping as he came off a ladder stepping down on a metal plate and rolled his ankle. Patient states that he felt/heard a pop in his ankle. When his condition did not improve over the next 6 days, he presented to the  ED on 6/14/19. X-rays were performed which revealed a non-displaced medial malleolar fracture. Patient was placed in a posterior splint, given crutches and advised to follow up with our office. He has had continued pain and swelling to the medial aspect of his ankle.  He describes the pain as throbbing pulsing and is rated 10/10 when he attempts to ambulate without the crutches.  Pain worsens when he is ambulating and feels better when he is at rest. He denies any radiating pain.      Due to the current findings, affected activity of daily living and continued pain and discomfort, surgical intervention is indicated. The alternatives, risks, and complications, including but not limited to infection, blood loss, need for blood transfusion, neurovascular damage, coty-incisional numbness, subcutaneous hematoma, bone fracture, anesthetic complications, DVT, PE, death, RSD, postoperative stiffness and pain, possible surgical scar, delayed healing and nonhealing, reflexive sympathetic dystrophy, damage to blood vessels and nerves, need for more surgery, MI, and stroke have been discussed. The patient understands and wishes to proceed with surgery. Patient will require clearance from his PCP and Cardiology. Patient states that he has had a history of right lung lobectomy in 1990 and he occasionally has some SOB when he gets warm. He also has a hiatal hernia, and history of anxiety. Patient states the he has a history of blood clots in his left arm and hands about 6-7 years ago and was placed on Coumadin for one year. He has had a cardiac cath procedure in the past and is followed by Dr. Jennifer Hall.        PAST MEDICAL HISTORY:     Past Medical History:   Diagnosis Date    Anxiety     Arthritis     Bilateral leg edema     Chronic obstructive pulmonary disease (HCC)     CTS (carpal tunnel syndrome)     DJD (degenerative joint disease) of knee     Bilateral; endstage degenerative arthritis of right knee    Gastric ulcer, unspecified as acute or chronic, without mention of hemorrhage or perforation     patient listed stomach problems or ulcers    GERD (gastroesophageal reflux disease)     Hiatal hernia     History of blood clots 2012    Treated with Coumadin for 1 year    Hypertension     Knee pain     Lumbar pain     Medial meniscus tear May 2011    Right knee    SOB (shortness of breath)     Venous stasis of lower extremity        CURRENT MEDICATIONS:     Current Outpatient Medications   Medication Sig Dispense Refill    tolnaftate (TINACTIN) 1 % topical cream Apply  to affected area two (2) times a day. 15 g 0    promethazine (PHENERGAN) 25 mg tablet Take 1 Tab by mouth every six (6) hours as needed for Nausea. 12 Tab 0    amLODIPine (NORVASC) 10 mg tablet Take 1 Tab by mouth daily. 30 Tab 0    terazosin (HYTRIN) 10 mg capsule Take 10 mg by mouth nightly.  mometasone-formoterol (DULERA) 200-5 mcg/actuation HFA inhaler Take 2 Puffs by inhalation two (2) times a day.  tiZANidine (ZANAFLEX) 4 mg tablet Take 2-3 Tabs by mouth three (3) times daily as needed (spasm). Dose change 720 Tab 5    DIPHENHYDRAMINE HCL (BENADRYL ALLERGY PO) Take  by mouth.  ergocalciferol (VITAMIN D) 50,000 unit capsule Take 50,000 Units by mouth as directed.  buPROPion SR (WELLBUTRIN SR) 150 mg SR tablet Take  by mouth two (2) times a day.  magnesium oxide (MAG-OX) 400 mg tablet Take 400 mg by mouth daily.  omeprazole (PRILOSEC) 20 mg capsule Take 20 mg by mouth two (2) times daily (with meals).  albuterol sulfate (PROVENTIL;VENTOLIN) 2.5 mg/0.5 mL Nebu 2.5 mg by Nebulization route once as needed.  sertraline (ZOLOFT) 100 mg tablet Take 100 mg by mouth daily.  triamcinolone acetonide (KENALOG) 0.1 % ointment Apply  to affected area two (2) times a day.  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    HX KNEE ARTHROSCOPY  5/26/2011    Right knee with partial medial meniscectomy of right knee    HX LOBECTOMY Right 1990    partial    HX ORTHOPAEDIC      left thumb joint     HX TONSILLECTOMY      AK RMVL LUNG OTHER THAN PNEUMONECTOMY 1 LOBE LOBECT right for cancer in 2001 - no chemo        SOCIAL HISTORY:     Social History     Socioeconomic History    Marital status:      Spouse name: Not on file    Number of children: Not on file    Years of education: Not on file    Highest education level: Not on file   Tobacco Use    Smoking status: Never Smoker    Smokeless tobacco: Never Used   Substance and Sexual Activity    Alcohol use: No    Drug use: No       FAMILY HISTORY:     Family History   Problem Relation Age of Onset    Hypertension Father     Asthma Father     Arthritis-osteo Father        REVIEW OF SYSTEMS:     Negative for fevers, chills, chest pain, shortness of breath, weight loss, recent illness     General: Negative for fever and chills. No unexpected change in weight. Denies fatigue. No change in appetite. Skin: Negative for rash or itching. HEENT: Negative for congestion, sore throat, neck pain and neck stiffness. No change in vision or hearing. Hasn't noted any enlarged lymph nodes in the neck. Cardiovascular:  Negative for chest pain and palpitations. Has not noted pedal edema. Respiratory: Negative for cough, colds, sinus, hemoptysis, shortness of breath and wheezing. Gastrointestinal: Negative for nausea and vomiting, rectal bleeding, coffee ground emesis, abdominal pain, diarrhea and constipation. Genitourinary: Negative for dysuria, frequency urgency, or burning on micturition. No flank pain, no foul smelling urine, no difficulty with initiating urination. Hematological: Negative for bleeding or easy bruising. Musculoskeletal: Negative  for arthralgias, back pain or neck pain. Neurological: Negative for dizziness, seizures or syncopal episodes. Denies headaches. Endocrine: Denies excessive thirst.  No heat/cold intolerance. Psychiatric: Negative for depression or insomnia.     PHYSICAL EXAMINATION:     VITALS:   Visit Vitals  /61   Pulse 60   Temp 98.1 °F (36.7 °C) (Oral)   Ht 6' 3\" (1.905 m)   Wt 273 lb (123.8 kg)   SpO2 98%   BMI 34.12 kg/m²       GEN:  Well developed, well nourished 79 y.o. male in no acute distress. PSYCH: Alert an oriented to person, place and time. Mood, memory, affect, behavior and judgment normal  HEENT: Normocephalic and atraumatic. Eyes: Conjunctivae and EOM are normal.Pupils are equal, round, and reactive to light. External ear normal appearance, external nose normal appearing. Mouth/Throat: Oropharynx is clear and moist, able to handle oral secretions w/out difficulty, airway patent. NECK: Supple. Normal ROM, No lymphadenopathy. Trachea is midline. No bruising, swelling or deformity  RESP: Clear to auscultation bilaterally. No wheezes, rales, rhonchi. Normal effort and breath sounds. No respiratory distress  CARDIO: Bradycardia noted. No MGR. ABDOMEN: Soft, non-tender, non-distended, normoactive bowel sounds in all four quadrants. There is no tenderness. There is no rebound and no guarding. BACK: No CVA or spinal tenderness  BREAST:  Deferred  PELVIC:    Deferred   RECTAL:  Deferred   :           Deferred  EXTREMITIES: EXAMINATION OF:  ANKLE/FOOT right     Gait: in CAM boot, using crutches  Tenderness: Tenderness to the medial and lateral ankle is present  Cutaneous: Discoloration to the lower leg indicative of venous stasis disease. Skin irritation in the 1st web space and on the #2 toe . Joint Motion: Not tested at this time. Joint / Tendon Stability: No Ankle or Subtalar instability or joint laxity. No peroneal sublux ability or dislocation  Alignment: Forefoot, Midfoot, Hindfoot WNL. Varus alignment of the knee. Neuro Motor/Sensory: NL/NL. Vascular: NL foot/ankle pulses. Numerous varicose veins to the lower legs. 2+ pitting edema. Decreased monofilament testing.    Lymphatics: No extremity lymphedema, No calf swelling, no tenderness to calf muscles. RADIOGRAPHS & DIAGNOSTIC STUDIES:     XR Results (most recent):  Results from Hospital Encounter encounter on 06/14/19   XR ANKLE RT MIN 3 V    Narrative EXAM: XR ANKLE RT MIN 3 V    INDICATION: Pain    COMPARISON: None. FINDINGS: Three views of the right ankle demonstrate a nondisplaced fracture of  the medial malleolus. The ankle mortise is symmetric and intact. The distal  fibula is unremarkable. Soft tissue swelling is seen over the medial malleolus. .  No other osseous, articular or soft tissue abnormalities are identified. Impression IMPRESSION:  Nondisplaced fracture right medial malleolus. The ankle mortise is symmetric and intact. Soft tissue swelling over the medial malleolus. [facility] LABS:     Pending    ASSESSMENT:       Encounter Diagnoses   Name Primary?  Closed nondisplaced fracture of medial malleolus of right tibia with routine healing, subsequent encounter Yes    Acute right ankle pain     Tinea pedis of right foot     Pre-op exam        PLAN:     Again, the alternatives, risks, and complications, as well as expected outcome were discussed. The patient understands and agrees to proceed with the above listed surgery pending completion of preoperative labs and clearance from patients PCP and cardiology. Patient has been given Hibiclens wash with instructions and/or prescriptions or orders listed above.     Karoline Montero PA-C  6/25/2019  4:06 PM

## 2019-06-26 NOTE — PROGRESS NOTES
Patient: Amber Ramos                     MRN: [id number]       SSN: [id number] YOB: 1950                       AGE: 79 y.o. SEX: male PCP:Scotty Holman MD 
 
DOI: 6/9/19 Chief Complaint:  
Chief Complaint Patient presents with  Ankle Pain  
  right HPI:  
 
Amber Ramos is a 79 y.o. male who presents today for follow up evaluation of right medial malleolar fracture. Patient has tried to limit WB and elevate the RLE. His swelling has decreased some and his pain continues. Patient has been seen by Dr. Ngozi Cha in the office today and Dr. Ngozi Cha has recommended surgical fixation of the medial malleolar fracture. H&P was completed during this encounter. REVIEW OF SYSTEMS:  
                 
 
Review of Systems: Chest pain: no Shortness of breath: no 
Fever: no 
Night sweats: no 
Weight loss: no 
Constitutional signs: no 
 
PHYSICAL EXAM:  
 
Visit Vitals /61 Pulse 60 Temp 98.1 °F (36.7 °C) (Oral) Ht 6' 3\" (1.905 m) Wt 273 lb (123.8 kg) SpO2 98% BMI 34.12 kg/m² Pain Scale: 4/10 Appearance: Amber Ramos is an alert, well appearing 79 y.o. male who is oriented to person, place/time, and who follows commands. Seated comfortably in no acute distress. Speech normal in context and clarity. Psychiatric: Affect, mood, judgement, behavior and conduct are appropriate. Memory grossly intact, no dementia Patient arrives to office via: with assistive device: crutches Patient accompanied in the examination room  by: spouse HEENT: Head normocephalic & atraumatic. Both pupils are round, non icteric sclera The EOM are intact. External ear normal appearance, hearing intact, no   
            hearing aid device. External nose normal appearing. Mouth/Throat:   
            Oropharynx is clear and moist, able to handle oral secretions w/out difficulty,  
            airway patent. NECK: Supple. Normal ROM, No lymphadenopathy. Trachea is midline. No bruising, swelling or deformity Respiratory: Normal effort and breath sounds. Breathing is unlabored without accessory chest muscle use. No audible wheezing from mouth. No respiratory distress. Abdomen: Non-distended, soft and non-tender, no rebound and no guarding, no abnormal bowel sounds Cardiovascular/Peripheral Vascular: Normal pulses to each hand and foot. No varicosities noted. No apparent venous status noted. Integumentary: Skin intact, warm and normal color. No abrasions, blisters, wounds, ulceration, or rash. Irritation to forefoot from his shoe BACK: No CVA or spinal tenderness MUSCULOSKELETAL EXAM of:  right foot/ankle FULLNESS OR SWELLING:  moderate edema to medial ankle, no erythema, no ecchymosis noted. TENDERNESS:  moderate tenderness to palpation of the medial > lateral ankle DVT ASSESSMENT:  Calf is soft and non-tender. There is no evidence of DVT or infection ROM: not tested STABILITY: Not tested DEFORMITY: not present MOTOR/STRENGTH/TONE: strength WNL in tested muscle groups 
     no fasciculations or no involuntary movements noted SENSORY EXAM:  Grossly intact, no sensory deficits noted NEUROVASCULAR: Neurovascular status is grossly intact. No significant edema  
     or embolic phenomena to foot/toes, hand/fingers. Extremities are warm and appear well perfused. Positive distal pulses and capillary refill IMPRESSION:  
 
1. Closed nondisplaced fracture of medial malleolus of right tibia with routine healing, subsequent encounter 2. Acute right ankle pain 3. Tinea pedis of right foot 4. Pre-op exam   
 
 
 
PLAN:  
 
 
 
Orders Placed This Encounter  AMB SUPPLY ORDER  
 CULTURE, URINE  
 [[id number]] Ankle Min 3V  CT ANKLE RT WO  CONT  XR CHEST PA LAT  CBC WITH AUTOMATED DIFF  
 METABOLIC PANEL, COMPREHENSIVE  
  URINALYSIS W/ RFLX MICROSCOPIC  
 PROTHROMBIN TIME + INR  
 EKG, 12 LEAD, INITIAL  tolnaftate (TINACTIN) 1 % topical cream  
  
  
 
 
Wear CAM boot and instructed - no weight bearing. Do not drive in the CAM boot Follow RICE protocol listed below Surgery scheduled for Friday, 6/28 at  Patient will require clearance from his PCP and Cardiology. Patient states that he has had a history of right lung lobectomy in 1990 and he occasionally has some SOB when he gets warm. He also has a hiatal hernia, and history of anxiety. Patient states the he has a history of blood clots in his left arm and hands about 6-7 years ago. He states that he was placed on Coumadin for one year. He has had a cardiac cath procedure in the past and is followed by Dr. Jennifer Hall. PAST MEDICAL HISTORY:  
 
Past Medical History:  
Diagnosis Date  Arthritis  Chronic obstructive pulmonary disease ([facility] [address]  CTS (carpal tunnel syndrome)  DJD (degenerative joint disease) of knee Bilateral; endstage degenerative arthritis of right knee  Gastric ulcer, unspecified as acute or chronic, without mention of hemorrhage or perforation   
 patient listed stomach problems or ulcers  Hypertension  Knee pain  Lumbar pain  Medial meniscus tear May 2011 Right knee MEDICATIONS:  
 
Current Outpatient Medications Medication Sig  tolnaftate (TINACTIN) 1 % topical cream Apply  to affected area two (2) times a day.  promethazine (PHENERGAN) 25 mg tablet Take 1 Tab by mouth every six (6) hours as needed for Nausea.  amLODIPine (NORVASC) 10 mg tablet Take 1 Tab by mouth daily.  terazosin (HYTRIN) 10 mg capsule Take 10 mg by mouth nightly.  mometasone-formoterol (DULERA) 200-5 mcg/actuation HFA inhaler Take 2 Puffs by inhalation two (2) times a day.  tiZANidine (ZANAFLEX) 4 mg tablet Take 2-3 Tabs by mouth three (3) times daily as needed (spasm). Dose change  DIPHENHYDRAMINE HCL (BENADRYL ALLERGY PO) Take  by mouth.  ergocalciferol (VITAMIN D) 50,000 unit capsule Take 50,000 Units by mouth as directed.  buPROPion SR (WELLBUTRIN SR) 150 mg SR tablet Take  by mouth two (2) times a day.  magnesium oxide (MAG-OX) 400 mg tablet Take 400 mg by mouth daily.  omeprazole (PRILOSEC) 20 mg capsule Take 20 mg by mouth two (2) times daily (with meals).  albuterol sulfate (PROVENTIL;VENTOLIN) 2.5 mg/0.5 mL Nebu 2.5 mg by Nebulization route once as needed.  sertraline (ZOLOFT) 100 mg tablet Take 100 mg by mouth daily.  triamcinolone acetonide (KENALOG) 0.1 % ointment Apply  to affected area two (2) times a day. use thin layer No current facility-administered medications for this visit. ALLERGIES:  
 
Allergies Allergen Reactions  Aspirin Other (comments) Stomach bleed  Benazepril Angioedema  Metoprolol Other (comments) Sexual dysfunction  Pcn [Penicillins] Rash PAST SURGICAL HISTORY:  
 
Past Surgical History:  
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    
 left thumb joint  HX TONSILLECTOMY  IN RMVL LUNG OTHER THAN PNEUMONECTOMY 1 LOBE LOBECT    
 right for cancer in 2001 - no chemo SOCIAL HISTORY:  
 
Social History Socioeconomic History  Marital status:  Spouse name: Not on file  Number of children: Not on file  Years of education: Not on file  Highest education level: Not on file Occupational History  Not on file Social Needs  Financial resource strain: Not on file  Food insecurity:  
  Worry: Not on file Inability: Not on file  Transportation needs:  
  Medical: Not on file Non-medical: Not on file Tobacco Use  Smoking status: Never Smoker  Smokeless tobacco: Never Used Substance and Sexual Activity  Alcohol use: No  
 Drug use: No  
 Sexual activity: Not on file Lifestyle  Physical activity:  
  Days per week: Not on file Minutes per session: Not on file  Stress: Not on file Relationships  Social connections:  
  Talks on phone: Not on file Gets together: Not on file Attends Tenriism service: Not on file Active member of club or organization: Not on file Attends meetings of clubs or organizations: Not on file Relationship status: Not on file  Intimate partner violence:  
  Fear of current or ex partner: Not on file Emotionally abused: Not on file Physically abused: Not on file Forced sexual activity: Not on file Other Topics Concern  Not on file Social History Narrative  Not on file FAMILY HISTORY:  
 
Family History Problem Relation Age of Onset  Hypertension Father  Asthma Father  Arthritis-osteo Father RADIOGRAPHS & DIAGNOSTIC STUDIES Lerry Shepherd XR Results (most recent): 
Results from Hospital Encounter encounter on 06/14/19 XR ANKLE RT MIN 3 V Narrative EXAM: XR ANKLE RT MIN 3 V 
 
INDICATION: Pain COMPARISON: None. FINDINGS: Three views of the right ankle demonstrate a nondisplaced fracture of 
the medial malleolus. The ankle mortise is symmetric and intact. The distal 
fibula is unremarkable. Soft tissue swelling is seen over the medial malleolus. Lerry Jacque No other osseous, articular or soft tissue abnormalities are identified. Impression IMPRESSION:  Nondisplaced fracture right medial malleolus. The ankle mortise is symmetric and intact. Soft tissue swelling over the medial malleolus. Andrew Marshall PA-C 
6/25/2019

## 2019-06-27 ENCOUNTER — HOSPITAL ENCOUNTER (OUTPATIENT)
Dept: GENERAL RADIOLOGY | Age: 69
Discharge: HOME | End: 2019-06-27
Attending: PHYSICIAN ASSISTANT
Payer: MEDICARE

## 2019-06-27 ENCOUNTER — HOSPITAL ENCOUNTER (OUTPATIENT)
Dept: PREADMISSION TESTING | Age: 69
Discharge: HOME | End: 2019-06-27
Attending: PHYSICIAN ASSISTANT
Payer: MEDICARE

## 2019-06-27 ENCOUNTER — HOSPITAL ENCOUNTER (OUTPATIENT)
Dept: CT IMAGING | Age: 69
Discharge: HOME | End: 2019-06-27
Attending: PHYSICIAN ASSISTANT
Payer: MEDICARE

## 2019-06-27 ENCOUNTER — OFFICE VISIT (OUTPATIENT)
Dept: CARDIOLOGY CLINIC | Age: 69
End: 2019-06-27

## 2019-06-27 VITALS
HEIGHT: 75 IN | WEIGHT: 289 LBS | SYSTOLIC BLOOD PRESSURE: 151 MMHG | DIASTOLIC BLOOD PRESSURE: 76 MMHG | BODY MASS INDEX: 35.93 KG/M2 | HEART RATE: 57 BPM

## 2019-06-27 DIAGNOSIS — C34.91 CARCINOMA OF RIGHT LUNG (HCC): ICD-10-CM

## 2019-06-27 DIAGNOSIS — Z01.818 PRE-OP EXAM: ICD-10-CM

## 2019-06-27 DIAGNOSIS — Q21.12 PFO (PATENT FORAMEN OVALE): ICD-10-CM

## 2019-06-27 DIAGNOSIS — J44.9 CHRONIC OBSTRUCTIVE PULMONARY DISEASE, UNSPECIFIED COPD TYPE (HCC): ICD-10-CM

## 2019-06-27 DIAGNOSIS — S82.54XD CLOSED NONDISPLACED FRACTURE OF MEDIAL MALLEOLUS OF RIGHT TIBIA WITH ROUTINE HEALING, SUBSEQUENT ENCOUNTER: ICD-10-CM

## 2019-06-27 DIAGNOSIS — Z01.818 PREOPERATIVE CLEARANCE: Primary | ICD-10-CM

## 2019-06-27 DIAGNOSIS — M25.571 ACUTE RIGHT ANKLE PAIN: ICD-10-CM

## 2019-06-27 DIAGNOSIS — I10 ESSENTIAL HYPERTENSION: ICD-10-CM

## 2019-06-27 LAB
ALBUMIN SERPL-MCNC: 3.5 G/DL (ref 3.4–5)
ALBUMIN/GLOB SERPL: 1.1 {RATIO} (ref 0.8–1.7)
ALP SERPL-CCNC: 94 U/L (ref 45–117)
ALT SERPL-CCNC: 30 U/L (ref 16–61)
ANION GAP SERPL CALC-SCNC: 5 MMOL/L (ref 3–18)
APPEARANCE UR: CLEAR
AST SERPL-CCNC: 15 U/L (ref 15–37)
BACTERIA URNS QL MICRO: ABNORMAL /HPF
BASOPHILS # BLD: 0.1 K/UL (ref 0–0.1)
BASOPHILS NFR BLD: 1 % (ref 0–2)
BILIRUB SERPL-MCNC: 0.3 MG/DL (ref 0.2–1)
BILIRUB UR QL: NEGATIVE
BUN SERPL-MCNC: 16 MG/DL (ref 7–18)
BUN/CREAT SERPL: 17 (ref 12–20)
CALCIUM SERPL-MCNC: 8.6 MG/DL (ref 8.5–10.1)
CHLORIDE SERPL-SCNC: 106 MMOL/L (ref 100–108)
CO2 SERPL-SCNC: 29 MMOL/L (ref 21–32)
COLOR UR: YELLOW
CREAT SERPL-MCNC: 0.94 MG/DL (ref 0.6–1.3)
DIFFERENTIAL METHOD BLD: ABNORMAL
EOSINOPHIL # BLD: 0.7 K/UL (ref 0–0.4)
EOSINOPHIL NFR BLD: 7 % (ref 0–5)
EPITH CASTS URNS QL MICRO: ABNORMAL /LPF (ref 0–5)
ERYTHROCYTE [DISTWIDTH] IN BLOOD BY AUTOMATED COUNT: 13.6 % (ref 11.6–14.5)
GLOBULIN SER CALC-MCNC: 3.2 G/DL (ref 2–4)
GLUCOSE SERPL-MCNC: 95 MG/DL (ref 74–99)
GLUCOSE UR STRIP.AUTO-MCNC: NEGATIVE MG/DL
HCT VFR BLD AUTO: 37.9 % (ref 36–48)
HGB BLD-MCNC: 12.8 G/DL (ref 13–16)
HGB UR QL STRIP: ABNORMAL
INR PPP: 1 (ref 0.8–1.2)
KETONES UR QL STRIP.AUTO: NEGATIVE MG/DL
LEUKOCYTE ESTERASE UR QL STRIP.AUTO: NEGATIVE
LYMPHOCYTES # BLD: 1.9 K/UL (ref 0.9–3.6)
LYMPHOCYTES NFR BLD: 19 % (ref 21–52)
MCH RBC QN AUTO: 30.5 PG (ref 24–34)
MCHC RBC AUTO-ENTMCNC: 33.8 G/DL (ref 31–37)
MCV RBC AUTO: 90.2 FL (ref 74–97)
MONOCYTES # BLD: 1 K/UL (ref 0.05–1.2)
MONOCYTES NFR BLD: 10 % (ref 3–10)
NEUTS SEG # BLD: 6.2 K/UL (ref 1.8–8)
NEUTS SEG NFR BLD: 63 % (ref 40–73)
NITRITE UR QL STRIP.AUTO: NEGATIVE
PH UR STRIP: 6 [PH] (ref 5–8)
PLATELET # BLD AUTO: 258 K/UL (ref 135–420)
PMV BLD AUTO: 10.6 FL (ref 9.2–11.8)
POTASSIUM SERPL-SCNC: 4.6 MMOL/L (ref 3.5–5.5)
PROT SERPL-MCNC: 6.7 G/DL (ref 6.4–8.2)
PROT UR STRIP-MCNC: NEGATIVE MG/DL
PROTHROMBIN TIME: 12.7 SEC (ref 11.5–15.2)
RBC # BLD AUTO: 4.2 M/UL (ref 4.7–5.5)
RBC #/AREA URNS HPF: ABNORMAL /HPF (ref 0–5)
SODIUM SERPL-SCNC: 140 MMOL/L (ref 136–145)
SP GR UR REFRACTOMETRY: 1.02 (ref 1–1.03)
UROBILINOGEN UR QL STRIP.AUTO: 0.2 EU/DL (ref 0.2–1)
WBC # BLD AUTO: 9.7 K/UL (ref 4.6–13.2)
WBC URNS QL MICRO: ABNORMAL /HPF (ref 0–4)

## 2019-06-27 PROCEDURE — 81001 URINALYSIS AUTO W/SCOPE: CPT

## 2019-06-27 PROCEDURE — 80053 COMPREHEN METABOLIC PANEL: CPT

## 2019-06-27 PROCEDURE — 71046 X-RAY EXAM CHEST 2 VIEWS: CPT

## 2019-06-27 PROCEDURE — 85610 PROTHROMBIN TIME: CPT

## 2019-06-27 PROCEDURE — 36415 COLL VENOUS BLD VENIPUNCTURE: CPT

## 2019-06-27 PROCEDURE — 73700 CT LOWER EXTREMITY W/O DYE: CPT

## 2019-06-27 PROCEDURE — 85025 COMPLETE CBC W/AUTO DIFF WBC: CPT

## 2019-06-27 PROCEDURE — 87086 URINE CULTURE/COLONY COUNT: CPT

## 2019-06-27 NOTE — PROGRESS NOTES
HISTORY OF PRESENT ILLNESS  Benny Dela Cruz is a 79 y.o. male. Patient seen here as a new patient for preoperative assessment. New Patient   The history is provided by the patient. This is a new problem. Pertinent negatives include no chest pain, no abdominal pain, no headaches and no shortness of breath. Shortness of Breath   The history is provided by the patient. This is a recurrent problem. The problem occurs frequently. The problem has not changed since onset. Pertinent negatives include no fever, no headaches, no cough, no sputum production, no hemoptysis, no wheezing, no PND, no orthopnea, no chest pain, no vomiting, no abdominal pain, no rash, no leg swelling and no claudication. Associated medical issues include COPD. Review of Systems   Constitutional: Negative for chills and fever. HENT: Negative for nosebleeds. Eyes: Negative for blurred vision and double vision. Respiratory: Negative for cough, hemoptysis, sputum production, shortness of breath and wheezing. Cardiovascular: Negative for chest pain, palpitations, orthopnea, claudication, leg swelling and PND. Gastrointestinal: Negative for abdominal pain, heartburn, nausea and vomiting. Musculoskeletal: Negative for myalgias. Skin: Negative for rash. Neurological: Negative for dizziness, weakness and headaches. Endo/Heme/Allergies: Does not bruise/bleed easily.      Family History   Problem Relation Age of Onset    Hypertension Father     Asthma Father     Arthritis-osteo Father        Past Medical History:   Diagnosis Date    Anxiety     Arthritis     Bilateral leg edema     Chronic obstructive pulmonary disease (HCC)     CTS (carpal tunnel syndrome)     DJD (degenerative joint disease) of knee     Bilateral; endstage degenerative arthritis of right knee    Gastric ulcer, unspecified as acute or chronic, without mention of hemorrhage or perforation     patient listed stomach problems or ulcers    GERD (gastroesophageal reflux disease)     Hiatal hernia     History of blood clots 2012    Treated with Coumadin for 1 year    Hypertension     Knee pain     Lumbar pain     Medial meniscus tear May 2011    Right knee    SOB (shortness of breath)     Venous stasis of lower extremity        Past Surgical History:   Procedure Laterality Date    HX HEART CATHETERIZATION      HX HEENT      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    HX KNEE ARTHROSCOPY  5/26/2011    Right knee with partial medial meniscectomy of right knee    HX LOBECTOMY Right 1990    partial    HX ORTHOPAEDIC      left thumb joint     HX TONSILLECTOMY      [place] RMVL LUNG OTHER THAN PNEUMONECTOMY 1 LOBE LOBECT      right for cancer in 2001 - no chemo        Social History     Tobacco Use    Smoking status: Never Smoker    Smokeless tobacco: Never Used   Substance Use Topics    Alcohol use: No       Allergies   Allergen Reactions    Aspirin Other (comments)     Stomach bleed    Benazepril Angioedema    Metoprolol Other (comments)     Sexual dysfunction    Pcn [Penicillins] Rash       Prior to Admission medications    Medication Sig Start Date End Date Taking? Authorizing Provider   promethazine (PHENERGAN) 25 mg tablet Take 1 Tab by mouth every six (6) hours as needed for Nausea. 2/26/19  Yes Koko Collado MD   amLODIPine (NORVASC) 10 mg tablet Take 1 Tab by mouth daily. 7/17/18  Yes Christian Eller MD   terazosin (HYTRIN) 10 mg capsule Take 10 mg by mouth nightly. Yes Provider, Historical   mometasone-formoterol (DULERA) 200-5 mcg/actuation HFA inhaler Take 2 Puffs by inhalation two (2) times a day. Yes Provider, Historical   tiZANidine (ZANAFLEX) 4 mg tablet Take 2-3 Tabs by mouth three (3) times daily as needed (spasm). Dose change 8/29/16  Yes LINO Flores   DIPHENHYDRAMINE HCL (BENADRYL ALLERGY PO) Take  by mouth.    Yes Provider, Historical   ergocalciferol (VITAMIN D) 50,000 unit capsule Take 50,000 Units by mouth as directed. 9/22/10  Yes Provider, Historical   buPROPion SR (WELLBUTRIN SR) 150 mg SR tablet Take  by mouth two (2) times a day. Yes Provider, Historical   magnesium oxide (MAG-OX) 400 mg tablet Take 400 mg by mouth daily. Yes Provider, Historical   omeprazole (PRILOSEC) 20 mg capsule Take 20 mg by mouth two (2) times daily (with meals). 6/9/10  Yes Provider, Historical   albuterol sulfate (PROVENTIL;VENTOLIN) 2.5 mg/0.5 mL Nebu 2.5 mg by Nebulization route once as needed. 6/9/10  Yes Provider, Historical   sertraline (ZOLOFT) 100 mg tablet Take 100 mg by mouth daily. Yes Provider, Historical   triamcinolone acetonide (KENALOG) 0.1 % ointment Apply  to affected area two (2) times a day. use thin layer    Yes Provider, Historical         Visit Vitals  /76   Pulse (!) 57   Ht 6' 3\" (1.905 m)   Wt 131.1 kg (289 lb)   BMI 36.12 kg/m²       Physical Exam   Constitutional: He is oriented to person, place, and time. He appears well-developed and well-nourished. HENT:   Head: Normocephalic and atraumatic. Eyes: Conjunctivae are normal.   Neck: Neck supple. No JVD present. No tracheal deviation present. No thyromegaly present. Cardiovascular: Normal rate, regular rhythm and normal heart sounds. Exam reveals no gallop and no friction rub. No murmur heard. Pulmonary/Chest: Breath sounds normal. No respiratory distress. He has no wheezes. He has no rales. He exhibits no tenderness. Abdominal: Soft. There is no tenderness. Musculoskeletal: He exhibits no edema. Neurological: He is alert and oriented to person, place, and time. Skin: Skin is warm and dry. Psychiatric: He has a normal mood and affect. Mr. Drake Acevedo has a reminder for a \"due or due soon\" health maintenance. I have asked that he contact his primary care provider for follow-up on this health maintenance. No flowsheet data found.        I have personally reviewed patient's records available from hospital and other providers and incorporated findings in patient care. I Have personally reviewed recent relevant labs available and discussed with patient  I have personally reviewed patients ekg done at other facility. IMPRESSION:[id number]   Moderate airflow obstruction by GOLD criteria without improvement with   inhaled bronchodilators. ___________________________________________    PROCEDURES PERFORMED 2014  1. Percutaneous access to the right femoral artery. 2. Placement of catheter into aorta. 3. Aortic arch angiogram with radiographic interpretation. 4. Placement of catheter into left subclavian. 5. Left subclavian selective angiogram with radiographic interpretation. 6. Selective placement of catheter into left ulnar artery. 7. Left ulnar and palmar angiogram with radiographic interpretation. SUMMARY: [id number]  Left ventricle: Systolic function was normal by visual assessment. Ejection fraction was estimated to be 55 %. Left atrial appendage: No thrombus was identified. Atrial septum: Contrast injection was performed. There was a very small  right-to-left shunt, with provocative maneuvers to increase right atrial  pressure consistent with very small PFO. Assessment         ICD-10-CM ICD-9-CM    1. Preoperative clearance Z01.818 V72.84 AMB POC EKG ROUTINE W/ 12 LEADS, INTER & REP    For tibia fracture surgery. Cardiac status stable. Okay to proceed with moderate risk   2. Essential hypertension I10 401.9     Stable continue treatment   3. Chronic obstructive pulmonary disease, unspecified COPD type ([address] J44.9 496     Stable on treatment. Follow-up with PCP. Exertional shortness of breath continue treatment. Also likely has sleep apnea   4. PFO (patent foramen ovale) Q21.1 745.5     Small PFO documented on [facility] in 2014 asymptomatic   5. Carcinoma of right lung ([address] C34.91 162.9     Status post lobectomy many years ago     X 2019  Seen today for preoperative assessment. Cardiac status is stable.   EKG is normal.  No anginal symptoms no significant valvular disease clinically. Okay to proceed with surgery as planned with moderate cardiac risk. Has COPD and likely sleep apnea. History of PFO by BRYAN in 2014. No recent concerns. Medications Discontinued During This Encounter   Medication Reason    tolnaftate (TINACTIN) 1 % topical cream Not A Current Medication       Orders Placed This Encounter    AMB POC EKG ROUTINE W/ 12 LEADS, INTER & REP     Order Specific Question:   Reason for Exam:     Answer:   Surgical Clearance       Follow-up and Dispositions    · Return in about 3 months (around 9/27/2019) for Cleared for planned surgery, moderate risk.

## 2019-06-28 RX ORDER — SULFAMETHOXAZOLE AND TRIMETHOPRIM 800; 160 MG/1; MG/1
1 TABLET ORAL 2 TIMES DAILY
Qty: 20 TAB | Refills: 0 | Status: SHIPPED | OUTPATIENT
Start: 2019-06-28 | End: 2019-07-24 | Stop reason: SDUPTHER

## 2019-06-28 NOTE — PROGRESS NOTES
Patients urinalysis revealed a few bacteria. Rx sent to patient pharmacy for Bactrim. Please have patient  rx from pharmacy.     Russell Gaspar PA-C  6/28/2019   8:56 AM

## 2019-06-29 LAB
BACTERIA SPEC CULT: NORMAL
SERVICE CMNT-IMP: NORMAL

## 2019-07-01 ENCOUNTER — OFFICE VISIT (OUTPATIENT)
Dept: ORTHOPEDIC SURGERY | Age: 69
End: 2019-07-01

## 2019-07-01 VITALS
OXYGEN SATURATION: 93 % | BODY MASS INDEX: 35.93 KG/M2 | HEART RATE: 64 BPM | SYSTOLIC BLOOD PRESSURE: 126 MMHG | DIASTOLIC BLOOD PRESSURE: 74 MMHG | WEIGHT: 289 LBS | RESPIRATION RATE: 14 BRPM | HEIGHT: 75 IN | TEMPERATURE: 96.3 F

## 2019-07-01 DIAGNOSIS — B35.3 TINEA PEDIS OF RIGHT FOOT: ICD-10-CM

## 2019-07-01 DIAGNOSIS — S82.54XD CLOSED NONDISPLACED FRACTURE OF MEDIAL MALLEOLUS OF RIGHT TIBIA WITH ROUTINE HEALING, SUBSEQUENT ENCOUNTER: Primary | ICD-10-CM

## 2019-07-01 RX ORDER — ONDANSETRON 4 MG/1
4 TABLET, FILM COATED ORAL
Qty: 20 TAB | Refills: 0 | Status: SHIPPED | OUTPATIENT
Start: 2019-07-01

## 2019-07-01 RX ORDER — POLYETHYLENE GLYCOL 3350 17 G/17G
17 POWDER, FOR SOLUTION ORAL DAILY
Qty: 510 G | Refills: 0 | Status: SHIPPED | OUTPATIENT
Start: 2019-07-01 | End: 2019-07-31

## 2019-07-01 RX ORDER — HYDROCODONE BITARTRATE AND ACETAMINOPHEN 7.5; 325 MG/1; MG/1
1-2 TABLET ORAL
Qty: 40 TAB | Refills: 0 | Status: SHIPPED | OUTPATIENT
Start: 2019-07-01 | End: 2019-07-04

## 2019-07-01 NOTE — PROGRESS NOTES
AMBULATORY PROGRESS NOTE      Patient: Indigo Hidalgo             MRN: [id number]     SSN: [id number] Body mass index is 36.12 kg/m². YOB: 1950     AGE: 79 y.o. SEX: male    PCP: Robi Bradford MD     IMPRESSION/DIAGNOSIS AND TREATMENT PLAN     DIAGNOSES  1. Closed nondisplaced fracture of medial malleolus of right tibia with routine healing, subsequent encounter    2. Tinea pedis of right foot        Orders Placed This Encounter    polyethylene glycol (MIRALAX) 17 gram/dose powder    rivaroxaban (XARELTO) 10 mg tablet    HYDROcodone-acetaminophen (NORCO) 7.5-325 mg per tablet    ondansetron hcl (ZOFRAN) 4 mg tablet      Indigo Hidalgo understands his diagnoses and the proposed plan. We are getting him ready for surgery. I showed him the CT scan results, and it shows that he has a fracture to the medial malleolus. Recommendation is to stabilize this in order to give him the best chance of healing. Plan:    1) Continue using the CAM walker boot and crutches. 2) Continue to remain NWB to the RLE as directed. 3) Proceed with surgery as planned. 4) Norco 7.5 m-2 PO Q6HS; 40 tablets, 0 refills. 5) Zofran 4 m PO Q6HS PRN nausea; 20 tablets, 0 refills. 6) Miralax 17  PO every day; 30 days, 0 refills. 7) Xarelto 10 m PO QD; 21 tablets, 1 refills. RTO - after surgery     HPI AND EXAMINATION     Sean Harp IS A 79 y.o. male who presents to my outpatient office for follow up of a closed fracture of the right ankle. At the last visit, Yadira Huizar PA-C, performed an H&P, ordered pre-op labs, prescribed Tinactin 1% cream, ordered a CT scan of the right ankle, instructed the patient to continue activity modification as directed, to continue using the CAM boot, and to remain NWB to the RLE with the boot and crutches. Date of injury: 2019 (approximately). Since we saw him last, Mr. Tiffanie Quigley reports that he is still experiencing pain in his right ankle.  He states that he has already has his H&P with Ever Sandy PA-C, for his scheduled surgery. He presents to the office today with his CAM boot and his crutches. He is accompanied to the office today by his wife. He states that he has not received any prescriptions for pain yet. The patient has h/o blood clot in his arm. Visit Vitals  /74   Pulse 64   Temp 96.3 °F (35.7 °C) (Oral)   Resp 14   Ht 6' 3\" (1.905 m)   Wt 289 lb (131.1 kg)   SpO2 93%   BMI 36.12 kg/m²     Appearance: Alert, well appearing and pleasant patient who is in no distress, oriented to person, place/time, and who follows commands. This patient is accompanied in the examination room by his wife. Dementia: no dementia  Psychiatric: Affect and mood are appropriate. Patient arrives to office via: with assistive device: CAM boot and crutches  HEENT: Head normocephalic & atraumatic. Both pupils are round, non icteric sclera   Eye: EOM are intact and sclera are clear    Neck: ROM WNL and JVD neck is not present     Hearings Intact, does not require hearing aid device  Respiratory: Breathing is unlabored without accessory chest muscle use  Cardiovascular/Peripheral Vascular: Normal Pulses to each foot    ANKLE/FOOT right    Gait: in CAM boot, using crutches  Tenderness: Tenderness to the medial and lateral ankle is present  Cutaneous: Discoloration to the lower leg indicative of venous stasis disease. Skin irritation in the 1st web space and on the #2 toe . Joint Motion: Not tested at this time. Joint / Tendon Stability: No Ankle or Subtalar instability or joint laxity. No peroneal sublux ability or dislocation  Alignment: Forefoot, Midfoot, Hindfoot WNL. Varus alignment of the knee. Neuro Motor/Sensory: NL/NL. Vascular: NL foot/ankle pulses. Numerous varicose veins to the lower legs. 2+ pitting edema. Decreased monofilament testing.    Lymphatics: No extremity lymphedema, No calf swelling, no tenderness to calf muscles. CHART REVIEW     Past Medical History:   Diagnosis Date    Anxiety     Arthritis     Bilateral leg edema     Cancer ([facility] [address] 1999    lung    Chronic obstructive pulmonary disease (HCC)     CTS (carpal tunnel syndrome)     DJD (degenerative joint disease) of knee     Bilateral; endstage degenerative arthritis of right knee    Gastric ulcer, unspecified as acute or chronic, without mention of hemorrhage or perforation     patient listed stomach problems or ulcers    GERD (gastroesophageal reflux disease)     Hiatal hernia     History of blood clots 2012    Treated with Coumadin for 1 year    Hypertension     Ill-defined condition 2014    left ulnar artery aneurysm    Knee pain     Lumbar pain     Medial meniscus tear May 2011    Right knee    Sleep apnea     no cpap    SOB (shortness of breath)     Venous stasis of lower extremity      Current Outpatient Medications   Medication Sig    polyethylene glycol (MIRALAX) 17 gram/dose powder Take 17 g by mouth daily for 30 days.  rivaroxaban (XARELTO) 10 mg tablet Take 1 Tab by mouth daily.  HYDROcodone-acetaminophen (NORCO) 7.5-325 mg per tablet Take 1-2 Tabs by mouth every six (6) hours as needed for Pain for up to 3 days. Max Daily Amount: 8 Tabs.  ondansetron hcl (ZOFRAN) 4 mg tablet Take 1 Tab by mouth every eight (8) hours as needed for Nausea.  trimethoprim-sulfamethoxazole (BACTRIM DS) 160-800 mg per tablet Take 1 Tab by mouth two (2) times a day.  promethazine (PHENERGAN) 25 mg tablet Take 1 Tab by mouth every six (6) hours as needed for Nausea.  amLODIPine (NORVASC) 10 mg tablet Take 1 Tab by mouth daily.  terazosin (HYTRIN) 10 mg capsule Take 10 mg by mouth nightly.  mometasone-formoterol (DULERA) 200-5 mcg/actuation HFA inhaler Take 2 Puffs by inhalation two (2) times a day.  tiZANidine (ZANAFLEX) 4 mg tablet Take 2-3 Tabs by mouth three (3) times daily as needed (spasm).  Dose change    DIPHENHYDRAMINE HCL (BENADRYL ALLERGY PO) Take  by mouth.  ergocalciferol (VITAMIN D) 50,000 unit capsule Take 50,000 Units by mouth as directed.  buPROPion SR (WELLBUTRIN SR) 150 mg SR tablet Take  by mouth two (2) times a day.  magnesium oxide (MAG-OX) 400 mg tablet Take 400 mg by mouth daily.  omeprazole (PRILOSEC) 20 mg capsule Take 20 mg by mouth two (2) times daily (with meals).  albuterol sulfate (PROVENTIL;VENTOLIN) 2.5 mg/0.5 mL Nebu 2.5 mg by Nebulization route once as needed.  sertraline (ZOLOFT) 100 mg tablet Take 100 mg by mouth daily.  triamcinolone acetonide (KENALOG) 0.1 % ointment Apply  to affected area two (2) times a day. use thin layer      No current facility-administered medications for this visit. 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    HX KNEE ARTHROSCOPY  5/26/2011    Right knee with partial medial meniscectomy of right knee    HX LOBECTOMY Right 1990    partial    HX ORTHOPAEDIC      left thumb joint     HX TONSILLECTOMY      [place] RMVL LUNG OTHER THAN PNEUMONECTOMY 1 LOBE LOBECT      right for cancer in 2001 - no chemo      Social History     Occupational History    Not on file   Tobacco Use    Smoking status: Never Smoker    Smokeless tobacco: Never Used   Substance and Sexual Activity    Alcohol use: No    Drug use: No    Sexual activity: Not on file     Family History   Problem Relation Age of Onset    Hypertension Father     Asthma Father     Arthritis-osteo Father         REVIEW OF SYSTEMS : 7/1/2019  ALL BELOW ARE Negative except : SEE HPI     Constitutional: Negative for fever, chills and weight loss.  Neg Weight Loss  Cardiovascular: Negative for chest pain, claudication and leg swelling. SOB, SHAH   Gastrointestinal/Urological: Negative for  pain, N/V/D/C, Blood in stool or urine,dysuria                         Hematuria, Incontinence, pelvic pain  Musculoskeletal: see HPI. Neurological: Negative for dizziness and weakness, headaches,Visual Changes             Confusion,  Or Seizures,   Psychiatric/Behavioral: Negative for depression, memory loss and substance abuse. Extremities:  Negative for hair changes, rash or skin lesion changes. Hematologic: Negative for Bleeding problems, bruising, pallor or swollen lymph nodes. Peripheral Vascular: No calf pain, vascular vein tenderness to calf pain              No calf throbbing, posterior knee throbbing pain     DIAGNOSTIC IMAGING     No notes on file    CT Results (most recent):  Results from Hospital Encounter encounter on 06/27/19   CT ANKLE RT WO  CONT    Narrative EXAM:  CT Right Ankle without Contrast    CLINICAL INDICATION:     - Acute right ankle pain (M25.571)  - Closed nondisplaced fracture of medial malleolus of right tibia with routine  healing, subsequent encounter (S82.54XD)     COMPARISON:  Plain films 06/14/19    TECHNIQUE:      - Helical volumetric CT imaging of the right ankle is performed without  intravenous contrast administration. From these volumetric source imaging data,  coronal and sagittal multiplanar reconstruction images are generated for  improved anatomic delineation.  - Radiation dose optimization techniques are utilized as appropriate to the  exam, with combination of automated exposure control, adjustment of the mA  and/or kV according to patient's size (Including appropriate matching for  site-specific examinations), or use of iterative reconstruction technique.     FINDINGS:      Bones, Joints:    - Acute nondisplaced fracture of the medial malleolus in nearly vertical fashion  across the base of the medial malleolus is demonstrated, best appreciated on the  coronal reconstruction images, similar to the plain film findings.  - No associated lateral malleolar or posterior malleolar fracture is  demonstrated. - Mild marginal osteophytes are noted at the tibiotalar joint margin suggestive  of developing mild osteoarthrosis. Subtle rim osteophytes are also identified  at the subtalar joint particularly at the middle facet but also identifiable at  the anterior and posterior facets. - Moderate plantar and posterior heel spurs are observed. Slight associated  Achilles tendon thickening at the tendon insertion site. The plantar fascia  appears focally thickened as well adjacent to the plantar fascia insertion site. Soft Tissue:    - Ligaments, tendons and muscles are evaluated to better advantage with MR  imaging modality. If clinical suspicion is high for disruption of these types  of structures, MR may be considered for further delineation.  - There is suggestion of focal disruption in the posterior medial extension of  the superior retinaculum adjacent to the Achilles tendon, several which a  tubular structure appears to be protruding (axial #57-47, coronal #41-45). This  protuberant tubular structure probably represents the posterior tibial artery  with adjacent vein. - Mild diffuse subcutaneous edema in the medial and to lesser extent lateral  hilar region. Impression IMPRESSION:      1. Nondisplaced acute medial malleolar fracture. No convincing evidence of  associated lateral or posterior malleolar fractures. 2.  Slight osteoarthrosis at the tibiotalar joint and subtalar joint. 3.  Focal protrusion of the posterior tibial artery forming a hairpin loop,  approximately 3.2 cm above the level of the medial talar dome. Suggestive of  probable focal disruption in the retinacular covering of the posterior medial  ankle. 4.  Slight Achilles tendon thickening. Suggestive of mild tendinopathy. 5.  Plantar fascial thickening.   Suggestive of plantar fasciitis. Please see above section of this report. I have personally reviewed the results of the above study. The interpretation of this study is my professional opinion. Written by Lord Fabian, as dictated by Dr. Rebecca Farris. I, Dr. Rebecca Farris, confirm that all documentation is accurate.

## 2019-07-01 NOTE — PROGRESS NOTES
1. Have you been to the ER, urgent care clinic since your last visit? Hospitalized since your last visit? No    2. Have you seen or consulted any other health care providers outside of the [address] [place] [address] since your last visit? Include any pap smears or colon screening.  No

## 2019-07-02 RX ORDER — SULFAMETHOXAZOLE AND TRIMETHOPRIM 800; 160 MG/1; MG/1
TABLET ORAL
Qty: 20 TAB | Refills: 0 | Status: SHIPPED | OUTPATIENT
Start: 2019-07-02 | End: 2019-07-10 | Stop reason: SDUPTHER

## 2019-07-03 ENCOUNTER — ANESTHESIA EVENT (OUTPATIENT)
Dept: SURGERY | Age: 69
End: 2019-07-03
Payer: MEDICARE

## 2019-07-04 NOTE — BRIEF OP NOTE
BRIEF OPERATIVE NOTE    Date of Procedure: 7/5/2019   Preoperative Diagnosis: S82.54XD CLOSED NONDISPLACED FRACTURE OF MEDIAL MALLEOLUS OF RIGHT TIBIA WITH ROUTINE HEALING, SUBSEQUENT ENCOUNTER  Postoperative Diagnosis:  As above   Procedure(s):  OPEN REDUCTION INTERNAL FIXATION RIGHT ANKLE MEDIAL MALLEOLAR FRACTURE/POSSIBLE BONE GRAFT/C-ARM/ARTHREX/NERVE BLOCK  Surgeon(s) and Role:     * Mayra Frias MD - Primary         Surgical Assistant: Surgical Assistant:  Veronica Izquierdo [facility]    Surgical Staff:  Circ-1: Jacki Mcadams  Radiology Technician: (Unknown)  Scrub Tech-1: Malu Zepeda  Surg Asst-1: Oswaldo Drain PA:  ASSISTANT    Luis Yang MPA, MA, PA-C was medically necessary for the procedure. She assisted in patient positioning, retraction, placement of hardware, wound closure, placement of DME, and transfer of the patient to the PACU. No case tracking events are documented in the log. Anesthesia: General   Estimated Blood Loss:  5 ml  IV FLUIDS:  200 ml IVF  Tourniquet Time:  12 minutes at  300  Mm HG   Specimens: * No specimens in log *   Findings: medial malleolar vertical shear type Fx (nondisplaced)   Complications: none  Implants:   Implant Name Type Inv.  Item Serial No.  Lot No. LRB No. Used Action   4.0 x40 mm compression screw    ARTHREX N/A Right 2 Implanted

## 2019-07-05 ENCOUNTER — APPOINTMENT (OUTPATIENT)
Dept: GENERAL RADIOLOGY | Age: 69
End: 2019-07-05
Attending: ORTHOPAEDIC SURGERY
Payer: MEDICARE

## 2019-07-05 ENCOUNTER — HOSPITAL ENCOUNTER (OUTPATIENT)
Age: 69
Setting detail: OUTPATIENT SURGERY
Discharge: HOME | End: 2019-07-05
Attending: ORTHOPAEDIC SURGERY | Admitting: ORTHOPAEDIC SURGERY
Payer: MEDICARE

## 2019-07-05 ENCOUNTER — ANESTHESIA (OUTPATIENT)
Dept: SURGERY | Age: 69
End: 2019-07-05
Payer: MEDICARE

## 2019-07-05 VITALS
HEART RATE: 62 BPM | RESPIRATION RATE: 14 BRPM | DIASTOLIC BLOOD PRESSURE: 79 MMHG | WEIGHT: 234 LBS | SYSTOLIC BLOOD PRESSURE: 154 MMHG | BODY MASS INDEX: 29.09 KG/M2 | OXYGEN SATURATION: 96 % | HEIGHT: 75 IN | TEMPERATURE: 98.1 F

## 2019-07-05 PROCEDURE — 74011000258 HC RX REV CODE- 258: Performed by: PHYSICIAN ASSISTANT

## 2019-07-05 PROCEDURE — C1713 ANCHOR/SCREW BN/BN,TIS/BN: HCPCS | Performed by: ORTHOPAEDIC SURGERY

## 2019-07-05 PROCEDURE — 77030008462 HC STPLR SKN PROX J&J -A: Performed by: ORTHOPAEDIC SURGERY

## 2019-07-05 PROCEDURE — 74011250636 HC RX REV CODE- 250/636

## 2019-07-05 PROCEDURE — 76942 ECHO GUIDE FOR BIOPSY: CPT | Performed by: ANESTHESIOLOGY

## 2019-07-05 PROCEDURE — 77030020782 HC GWN BAIR PAWS FLX 3M -B: Performed by: ORTHOPAEDIC SURGERY

## 2019-07-05 PROCEDURE — 64450 NJX AA&/STRD OTHER PN/BRANCH: CPT | Performed by: ANESTHESIOLOGY

## 2019-07-05 PROCEDURE — 74011250636 HC RX REV CODE- 250/636: Performed by: NURSE ANESTHETIST, CERTIFIED REGISTERED

## 2019-07-05 PROCEDURE — 74011250637 HC RX REV CODE- 250/637: Performed by: ANESTHESIOLOGY

## 2019-07-05 PROCEDURE — 74011250636 HC RX REV CODE- 250/636: Performed by: PHYSICIAN ASSISTANT

## 2019-07-05 PROCEDURE — 74011000250 HC RX REV CODE- 250: Performed by: PHYSICIAN ASSISTANT

## 2019-07-05 PROCEDURE — 77030010509 HC AIRWY LMA MSK TELE -A: Performed by: ANESTHESIOLOGY

## 2019-07-05 PROCEDURE — 77030020274 HC MISC IMPL ORTHOPEDIC: Performed by: ORTHOPAEDIC SURGERY

## 2019-07-05 PROCEDURE — 76210000006 HC OR PH I REC 0.5 TO 1 HR: Performed by: ORTHOPAEDIC SURGERY

## 2019-07-05 PROCEDURE — C1769 GUIDE WIRE: HCPCS | Performed by: ORTHOPAEDIC SURGERY

## 2019-07-05 PROCEDURE — 76060000032 HC ANESTHESIA 0.5 TO 1 HR: Performed by: ORTHOPAEDIC SURGERY

## 2019-07-05 PROCEDURE — 77030018836 HC SOL IRR NACL ICUM -A: Performed by: ORTHOPAEDIC SURGERY

## 2019-07-05 PROCEDURE — 73600 X-RAY EXAM OF ANKLE: CPT

## 2019-07-05 PROCEDURE — 77030031139 HC SUT VCRL2 J&J -A: Performed by: ORTHOPAEDIC SURGERY

## 2019-07-05 PROCEDURE — 76210000022 HC REC RM PH II 1.5 TO 2 HR: Performed by: ORTHOPAEDIC SURGERY

## 2019-07-05 PROCEDURE — 77030003666 HC NDL SPINAL BD -A: Performed by: ORTHOPAEDIC SURGERY

## 2019-07-05 PROCEDURE — 64450 NJX AA&/STRD OTHER PN/BRANCH: CPT

## 2019-07-05 PROCEDURE — 77030003748: Performed by: ORTHOPAEDIC SURGERY

## 2019-07-05 PROCEDURE — 77030002933 HC SUT MCRYL J&J -A: Performed by: ORTHOPAEDIC SURGERY

## 2019-07-05 PROCEDURE — 77030032490 HC SLV COMPR SCD KNE COVD -B: Performed by: ORTHOPAEDIC SURGERY

## 2019-07-05 PROCEDURE — 77030019605: Performed by: ORTHOPAEDIC SURGERY

## 2019-07-05 PROCEDURE — 77030020268 HC MISC GENERAL SUPPLY: Performed by: ORTHOPAEDIC SURGERY

## 2019-07-05 PROCEDURE — 76010000138 HC OR TIME 0.5 TO 1 HR: Performed by: ORTHOPAEDIC SURGERY

## 2019-07-05 RX ORDER — FENTANYL CITRATE 50 UG/ML
INJECTION, SOLUTION INTRAMUSCULAR; INTRAVENOUS AS NEEDED
Status: DISCONTINUED | OUTPATIENT
Start: 2019-07-05 | End: 2019-07-05 | Stop reason: HOSPADM

## 2019-07-05 RX ORDER — MAGNESIUM SULFATE 100 %
4 CRYSTALS MISCELLANEOUS AS NEEDED
Status: DISCONTINUED | OUTPATIENT
Start: 2019-07-05 | End: 2019-07-05 | Stop reason: HOSPADM

## 2019-07-05 RX ORDER — MIDAZOLAM HYDROCHLORIDE 1 MG/ML
INJECTION, SOLUTION INTRAMUSCULAR; INTRAVENOUS AS NEEDED
Status: DISCONTINUED | OUTPATIENT
Start: 2019-07-05 | End: 2019-07-05 | Stop reason: HOSPADM

## 2019-07-05 RX ORDER — LIDOCAINE HYDROCHLORIDE 20 MG/ML
INJECTION, SOLUTION EPIDURAL; INFILTRATION; INTRACAUDAL; PERINEURAL AS NEEDED
Status: DISCONTINUED | OUTPATIENT
Start: 2019-07-05 | End: 2019-07-05 | Stop reason: HOSPADM

## 2019-07-05 RX ORDER — SODIUM CHLORIDE 0.9 % (FLUSH) 0.9 %
5-40 SYRINGE (ML) INJECTION AS NEEDED
Status: DISCONTINUED | OUTPATIENT
Start: 2019-07-05 | End: 2019-07-05 | Stop reason: HOSPADM

## 2019-07-05 RX ORDER — ONDANSETRON 2 MG/ML
4 INJECTION INTRAMUSCULAR; INTRAVENOUS ONCE
Status: DISCONTINUED | OUTPATIENT
Start: 2019-07-05 | End: 2019-07-05 | Stop reason: HOSPADM

## 2019-07-05 RX ORDER — ROPIVACAINE HYDROCHLORIDE 5 MG/ML
30 INJECTION, SOLUTION EPIDURAL; INFILTRATION; PERINEURAL
Status: COMPLETED | OUTPATIENT
Start: 2019-07-05 | End: 2019-07-05

## 2019-07-05 RX ORDER — SODIUM CHLORIDE, SODIUM LACTATE, POTASSIUM CHLORIDE, CALCIUM CHLORIDE 600; 310; 30; 20 MG/100ML; MG/100ML; MG/100ML; MG/100ML
100 INJECTION, SOLUTION INTRAVENOUS CONTINUOUS
Status: DISCONTINUED | OUTPATIENT
Start: 2019-07-05 | End: 2019-07-05 | Stop reason: HOSPADM

## 2019-07-05 RX ORDER — DEXAMETHASONE SODIUM PHOSPHATE 4 MG/ML
INJECTION, SOLUTION INTRA-ARTICULAR; INTRALESIONAL; INTRAMUSCULAR; INTRAVENOUS; SOFT TISSUE AS NEEDED
Status: DISCONTINUED | OUTPATIENT
Start: 2019-07-05 | End: 2019-07-05 | Stop reason: HOSPADM

## 2019-07-05 RX ORDER — SODIUM CHLORIDE, SODIUM LACTATE, POTASSIUM CHLORIDE, CALCIUM CHLORIDE 600; 310; 30; 20 MG/100ML; MG/100ML; MG/100ML; MG/100ML
50 INJECTION, SOLUTION INTRAVENOUS CONTINUOUS
Status: DISCONTINUED | OUTPATIENT
Start: 2019-07-05 | End: 2019-07-05 | Stop reason: HOSPADM

## 2019-07-05 RX ORDER — MIDAZOLAM HYDROCHLORIDE 1 MG/ML
2 INJECTION, SOLUTION INTRAMUSCULAR; INTRAVENOUS
Status: DISCONTINUED | OUTPATIENT
Start: 2019-07-05 | End: 2019-07-05 | Stop reason: HOSPADM

## 2019-07-05 RX ORDER — ONDANSETRON 2 MG/ML
INJECTION INTRAMUSCULAR; INTRAVENOUS AS NEEDED
Status: DISCONTINUED | OUTPATIENT
Start: 2019-07-05 | End: 2019-07-05 | Stop reason: HOSPADM

## 2019-07-05 RX ORDER — FENTANYL CITRATE 50 UG/ML
100 INJECTION, SOLUTION INTRAMUSCULAR; INTRAVENOUS
Status: DISCONTINUED | OUTPATIENT
Start: 2019-07-05 | End: 2019-07-05 | Stop reason: HOSPADM

## 2019-07-05 RX ORDER — PROPOFOL 10 MG/ML
INJECTION, EMULSION INTRAVENOUS AS NEEDED
Status: DISCONTINUED | OUTPATIENT
Start: 2019-07-05 | End: 2019-07-05 | Stop reason: HOSPADM

## 2019-07-05 RX ORDER — DEXTROSE 50 % IN WATER (D50W) INTRAVENOUS SYRINGE
25-50 AS NEEDED
Status: DISCONTINUED | OUTPATIENT
Start: 2019-07-05 | End: 2019-07-05 | Stop reason: HOSPADM

## 2019-07-05 RX ORDER — FAMOTIDINE 20 MG/1
20 TABLET, FILM COATED ORAL ONCE
Status: COMPLETED | OUTPATIENT
Start: 2019-07-05 | End: 2019-07-05

## 2019-07-05 RX ORDER — SODIUM CHLORIDE 0.9 % (FLUSH) 0.9 %
5-40 SYRINGE (ML) INJECTION EVERY 8 HOURS
Status: DISCONTINUED | OUTPATIENT
Start: 2019-07-05 | End: 2019-07-05 | Stop reason: HOSPADM

## 2019-07-05 RX ORDER — LIDOCAINE HYDROCHLORIDE 10 MG/ML
0.1 INJECTION, SOLUTION EPIDURAL; INFILTRATION; INTRACAUDAL; PERINEURAL AS NEEDED
Status: DISCONTINUED | OUTPATIENT
Start: 2019-07-05 | End: 2019-07-05 | Stop reason: HOSPADM

## 2019-07-05 RX ORDER — FENTANYL CITRATE 50 UG/ML
50 INJECTION, SOLUTION INTRAMUSCULAR; INTRAVENOUS
Status: DISCONTINUED | OUTPATIENT
Start: 2019-07-05 | End: 2019-07-05 | Stop reason: HOSPADM

## 2019-07-05 RX ORDER — INSULIN LISPRO 100 [IU]/ML
INJECTION, SOLUTION INTRAVENOUS; SUBCUTANEOUS ONCE
Status: DISCONTINUED | OUTPATIENT
Start: 2019-07-05 | End: 2019-07-05 | Stop reason: HOSPADM

## 2019-07-05 RX ADMIN — FENTANYL CITRATE 25 MCG: 50 INJECTION, SOLUTION INTRAMUSCULAR; INTRAVENOUS at 11:23

## 2019-07-05 RX ADMIN — DEXAMETHASONE SODIUM PHOSPHATE 4 MG: 4 INJECTION, SOLUTION INTRA-ARTICULAR; INTRALESIONAL; INTRAMUSCULAR; INTRAVENOUS; SOFT TISSUE at 11:23

## 2019-07-05 RX ADMIN — SODIUM CHLORIDE, SODIUM LACTATE, POTASSIUM CHLORIDE, AND CALCIUM CHLORIDE: 600; 310; 30; 20 INJECTION, SOLUTION INTRAVENOUS at 10:30

## 2019-07-05 RX ADMIN — ONDANSETRON 4 MG: 2 INJECTION INTRAMUSCULAR; INTRAVENOUS at 11:52

## 2019-07-05 RX ADMIN — SODIUM CHLORIDE, SODIUM LACTATE, POTASSIUM CHLORIDE, AND CALCIUM CHLORIDE 50 ML/HR: 600; 310; 30; 20 INJECTION, SOLUTION INTRAVENOUS at 09:43

## 2019-07-05 RX ADMIN — ROPIVACAINE HYDROCHLORIDE 150 MG: 5 INJECTION, SOLUTION EPIDURAL; INFILTRATION; PERINEURAL at 10:42

## 2019-07-05 RX ADMIN — FENTANYL CITRATE 100 MCG: 50 INJECTION INTRAMUSCULAR; INTRAVENOUS at 10:36

## 2019-07-05 RX ADMIN — LIDOCAINE HYDROCHLORIDE 60 MG: 20 INJECTION, SOLUTION EPIDURAL; INFILTRATION; INTRACAUDAL; PERINEURAL at 11:17

## 2019-07-05 RX ADMIN — FENTANYL CITRATE 25 MCG: 50 INJECTION, SOLUTION INTRAMUSCULAR; INTRAVENOUS at 11:16

## 2019-07-05 RX ADMIN — CLINDAMYCIN 900 MG: 150 INJECTION, SOLUTION INTRAMUSCULAR; INTRAVENOUS at 11:22

## 2019-07-05 RX ADMIN — FENTANYL CITRATE 25 MCG: 50 INJECTION, SOLUTION INTRAMUSCULAR; INTRAVENOUS at 11:41

## 2019-07-05 RX ADMIN — PROPOFOL 200 MG: 10 INJECTION, EMULSION INTRAVENOUS at 11:17

## 2019-07-05 RX ADMIN — LIDOCAINE HYDROCHLORIDE 2 ML: 10 INJECTION, SOLUTION EPIDURAL; INFILTRATION; INTRACAUDAL; PERINEURAL at 10:38

## 2019-07-05 RX ADMIN — MIDAZOLAM HYDROCHLORIDE 2 MG: 1 INJECTION, SOLUTION INTRAMUSCULAR; INTRAVENOUS at 11:07

## 2019-07-05 RX ADMIN — FENTANYL CITRATE 25 MCG: 50 INJECTION, SOLUTION INTRAMUSCULAR; INTRAVENOUS at 11:52

## 2019-07-05 RX ADMIN — MIDAZOLAM HYDROCHLORIDE 2 MG: 2 INJECTION, SOLUTION INTRAMUSCULAR; INTRAVENOUS at 10:36

## 2019-07-05 RX ADMIN — FAMOTIDINE 20 MG: 20 TABLET, FILM COATED ORAL at 10:09

## 2019-07-05 NOTE — DISCHARGE INSTRUCTIONS
DISCHARGE PLAN     1. DISCHARGE DISPOSITION:  Home  2. FOLLOW UP RETURN TO OFFICE: 5 days    Call [phone] to confirm your appointment to see Dr. Chintan Mendoza. Kurt Murillo MD.   Follow up with Primary Care Provider in 7 to 10 days. 3. WEIGHT BEARING STATUS/ROM:    WEIGHT BEARING  AS TOLERATED TO   to the Right LOWER EXTREMITY  4. ELEVATE the Right lower extremity on 1 pillow. Place the pillow horizontal so that no pressure is on the back of your heel    Please leave your current dressings and in place. Keep your dressings clean and dry to the: Right lower extremity      Please call [phone] ([address]) if any: fever, shakes, chills, intractable pain, or for any questions you have regarding your care/medical condition   1. If you experience any calf pain or swelling, or are having any shortness of  breath, chest pain, or extremity swelling, or bleeding thru any surgical dressings,  or Bleeding at any body location while you are taking on any blood thinners. ie (mouth,nose, skin sites:)   2. Please go to closest ER  ASAP for assessment to rule out a leg clot and to  assess any bleeding. 3. After general anesthesia or intravenous sedation, for 24 hours or while taking  prescription Narcotics:      [x]  Limit your activities     [x]   Do not drive and operate hazardous machinery    [x]   Do not make important personal or business decisions    [x]   Do  not drink alcoholic beverages    [x]  If you have not urinated within 8 hours after discharge, please contact    your surgeon on call.      Report the following to your surgeon: If any below is true         [x]   Excessive pain, swelling, redness or odor of or around the surgical area       [x]   Temperature over 100.5       [x]  Nausea and vomiting lasting longer than 4 hours or if unable to take medications       [x]    Any signs of decreased circulation or nerve impairment to extremity:    Change in color, persistent  numbness, tingling, coldness or increase pain          [x]  No smoking/ No tobacco products/ Avoid exposure to second hand smoke    5. DIET:  Cardiac Diet  OTC (Nutritional supplements/multivitamins/calcium w/ Vitamin  D   6. ACTIVITY:   // No lifting, twisting, squatting, deep bending. 7. INCISION CARE/DRESSINGS: Reinforce dressing PRN ,ACE Wraps    Keep all pets away from  any wound present in order to prevent infection. 8. PAIN CONTROL: Prescriptions written: Narcotics     Start taking your pain medications when you get home  9. VTE prophylaxis : Start XARELTO 10 MG ONE PO EACH DAY  date 7/5/2019 . 10. ANTIBIOTICS:  None at this time       Mini Cha MD  7/5/2019  12:03 PM      DISCHARGE SUMMARY from Nurse    PATIENT INSTRUCTIONS:    After general anesthesia or intravenous sedation, for 24 hours or while taking prescription Narcotics:  · Limit your activities  · Do not drive and operate hazardous machinery  · Do not make important personal or business decisions  · Do  not drink alcoholic beverages  · If you have not urinated within 8 hours after discharge, please contact your surgeon on call. Report the following to your surgeon:  · Excessive pain, swelling, redness or odor of or around the surgical area  · Temperature over 100.5  · Nausea and vomiting lasting longer than 4 hours or if unable to take medications  · Any signs of decreased circulation or nerve impairment to extremity: change in color, persistent  numbness, tingling, coldness or increase pain  · Any questions    What to do at Home:  Recommended activity: Activity as tolerated, with light weight bearing to right foot. Wear boot when ambulating. *  Please give a list of your current medications to your Primary Care Provider. *  Please update this list whenever your medications are discontinued, doses are      changed, or new medications (including over-the-counter products) are added.     *  Please carry medication information at all times in case of emergency situations. These are general instructions for a healthy lifestyle:    No smoking/ No tobacco products/ Avoid exposure to second hand smoke  Surgeon General's Warning:  Quitting smoking now greatly reduces serious risk to your health. Obesity, smoking, and sedentary lifestyle greatly increases your risk for illness    A healthy diet, regular physical exercise & weight monitoring are important for maintaining a healthy lifestyle    You may be retaining fluid if you have a history of heart failure or if you experience any of the following symptoms:  Weight gain of 3 pounds or more overnight or 5 pounds in a week, increased swelling in our hands or feet or shortness of breath while lying flat in bed. Please call your doctor as soon as you notice any of these symptoms; do not wait until your next office visit. The discharge information has been reviewed with the patient and spouse. The patient and spouse verbalized understanding. Discharge medications reviewed with the patient and spouse and appropriate educational materials and side effects teaching were provided.       ___________________________________________________________________________________________________________________________________

## 2019-07-05 NOTE — ANESTHESIA POSTPROCEDURE EVALUATION
Procedure(s):  OPEN REDUCTION INTERNAL FIXATION RIGHT ANKLE MEDIAL MALLEOLAR FRACTURE/POSSIBLE BONE GRAFT/C-ARM/ARTHREX/NERVE BLOCK.    general    Anesthesia Post Evaluation      Multimodal analgesia: multimodal analgesia used between 6 hours prior to anesthesia start to PACU discharge  Patient location during evaluation: bedside  Patient participation: complete - patient participated  Level of consciousness: awake  Pain management: adequate  Airway patency: patent  Anesthetic complications: no  Cardiovascular status: stable  Respiratory status: acceptable  Hydration status: acceptable  Post anesthesia nausea and vomiting:  controlled      Vitals Value Taken Time   /80 7/5/2019 12:34 PM   Temp 36.7 °C (98.1 °F) 7/5/2019 12:05 PM   Pulse 65 7/5/2019 12:40 PM   Resp 13 7/5/2019 12:40 PM   SpO2 96 % 7/5/2019 12:40 PM   Vitals shown include unvalidated device data.

## 2019-07-05 NOTE — ANESTHESIA PREPROCEDURE EVALUATION
Relevant Problems   No relevant active problems       Anesthetic History   No history of anesthetic complications            Review of Systems / Medical History  Patient summary reviewed and pertinent labs reviewed    Pulmonary    COPD    Sleep apnea: No treatment           Neuro/Psych         Psychiatric history     Cardiovascular    Hypertension                   GI/Hepatic/Renal     GERD      PUD     Endo/Other        Obesity and arthritis     Other Findings              Physical Exam    Airway  Mallampati: II  TM Distance: 4 - 6 cm  Neck ROM: normal range of motion   Mouth opening: Normal     Cardiovascular    Rhythm: regular  Rate: normal         Dental    Dentition: Poor dentition     Pulmonary  Breath sounds clear to auscultation               Abdominal  GI exam deferred       Other Findings            Anesthetic Plan    ASA: 3  Anesthesia type: general      Post-op pain plan if not by surgeon: peripheral nerve block single    Induction: Intravenous  Anesthetic plan and risks discussed with: Patient

## 2019-07-05 NOTE — ANESTHESIA PROCEDURE NOTES
Peripheral Block    Start time: 7/5/2019 10:37 AM  End time: 7/5/2019 10:50 AM  Performed by: Mayra Eddy MD  Authorized by: Mayra Eddy MD       Pre-procedure: Indications: at surgeon's request and post-op pain management    Preanesthetic Checklist: patient identified, risks and benefits discussed, site marked, timeout performed, anesthesia consent given and patient being monitored    Timeout Time: 10:37          Block Type:   Block Type:  Popliteal  Laterality:  Right    Assessment:    Injection Assessment:     Single Shot Nerve Block Procedure Note    Patient: Norma Braden MRN: [id number]  SSN: [id number]   YOB: 1950  Age: 79 y.o. Sex: male      Cardiovascular Function/Vital Signs  There were no vitals taken for this visit. Blocks Popliteal  Referring physician:   : Thaddeus Magaña MD    Indication: Post-operative analgesia per surgeon's request  Location: Preoperative Holding area. Sedation: Midazolam 2mg, fentanyl 100 mcg  Time out performed, correct patient, side, site, and procedure verified. Patient placed in supine position. Monitors/oxygen applied; right above knee marked and prepped with chloraprep. Target nerves identified by ultrasound and nerve stimulator, 15 ml's 0.5% Ropivicaine injected in divided doses with negative aspiration through 21 gauge 10 cm Stimuplex insulated needle. Nerve stimulator set up 1.5 mA, 0.1 mS, 2 HZ. Foot/toe twitch of sciatic nerve. Block Notes:   Incremental injection    Blood aspirated:  NO    Persistent Pain with injection:  NO    Resistance to injection:  NO    Events:  None - Easy and well-tolerated:  YES       Difficult:  NO   Ultrasound guidance used for needle placement  Nerves and surrounding structures ID'd  Perineural injection   No IV injection  Patient tolerated procedure well, vital signs stable throughout, with no apparent complications.   7/5/2019  11:31 AM  Thaddeus Magaña MD

## 2019-07-05 NOTE — INTERVAL H&P NOTE
H&P Update: 
Rene Vazquez was seen and examined. History and physical has been reviewed. The patient has been examined.  There have been no significant clinical changes since the completion of the originally dated History and Physical.

## 2019-07-05 NOTE — ANESTHESIA PROCEDURE NOTES
Peripheral Block    Start time: 7/5/2019 10:50 AM  End time: 7/5/2019 10:55 AM  Performed by: Sunny Brooks MD  Authorized by: Sunny Brooks MD       Pre-procedure: Indications: at surgeon's request and post-op pain management    Preanesthetic Checklist: patient identified, risks and benefits discussed, site marked, timeout performed, anesthesia consent given and patient being monitored    Timeout Time: 10:37          Block Type:   Block Type:  Saphenous  Laterality:  Right    Assessment:    Injection Assessment:     Single Shot Nerve Block Procedure Note    Patient: Goldie Koyanagi MRN: [id number]  [id number]: [id number]   YOB: 1950  Age: 79 y.o. Sex: male      Cardiovascular Function/Vital Signs  There were no vitals taken for this visit. Blocks Saphenous  Referring physician:   : Cat Michaels MD    Indication: Post-operative analgesia per surgeon's request  Location: Preoperative Holding area. Sedation: Midazolam mg, fentanyl mcg  Time out performed, correct patient, side, site, and procedure verified. Patient placed in supine position. Monitors/oxygen applied; right mid thigh marked and prepped with chloraprep. Target nerves identified by ultrasound, 8 ml's 0.5% Ropivicaine injected in divided doses with negative aspiration through 21 gauge 10 cm Stimuplex insulated needle. Nerve stimulator set up 1.5 mA, 0.1 mS, 2 HZ. Depth of needle at time of injection 6 cm. Block Notes:   Incremental injection    Blood aspirated:  NO    Persistent Pain with injection:  NO    Resistance to injection:  NO    Events:  None - Easy and well-tolerated:  YES       Difficult:  NO   Ultrasound guidance used for needle placement  Nerves and surrounding structures ID'd  Perineural injection   No IV injection  Patient tolerated procedure well, vital signs stable throughout, with no apparent complications.   7/5/2019  11:35 AM  Cat Michaels MD

## 2019-07-05 NOTE — OP NOTES
Patient: Michelle Pinon                MRN:@           SSN: [id number]   YOB: 1950         AGE: 79 y.o. SEX: male       OPERATIVE REPORT    Date of Procedure: 7/5/2019   Preoperative Diagnosis: S82.54XD CLOSED NONDISPLACED FRACTURE OF MEDIAL MALLEOLUS OF RIGHT TIBIA WITH ROUTINE HEALING, SUBSEQUENT ENCOUNTER  Postoperative Diagnosis:  As above   Procedure(s):  OPEN REDUCTION INTERNAL FIXATION RIGHT ANKLE MEDIAL MALLEOLAR FRACTURE/POSSIBLE BONE GRAFT/C-ARM/ARTHREX/NERVE BLOCK  Surgeon(s) and Role:     * Anson Feliciano MD - Primary         Surgical Assistant: Surgical Assistant:  Rocky Montero [address] [facility]    Surgical Staff:  Circ-1: Pat Monaco  Radiology Technician: (Unknown)  Scrub Tech-1: Henderson Abbot  Surg Asst-1: Leticia Nino PA:  ASSISTANT    Jose Francisco Watson MPA, MA, PA-C was medically necessary for the procedure. She assisted in patient positioning, retraction, placement of hardware, wound closure, placement of DME, and transfer of the patient to the PACU. No case tracking events are documented in the log. Anesthesia: General   Estimated Blood Loss:  5 ml  IV FLUIDS:  200 ml IVF  Tourniquet Time:  12 minutes at  300  Mm HG   Specimens: * No specimens in log *   Findings: medial malleolar vertical shear type Fx (nondisplaced)   Complications: none  Implants:   Implant Name Type Inv. Item Serial No.  Lot No. LRB No. Used Action   4.0 x40 mm compression screw    ARTHREX N/A Right 2 Implanted         OPERATIVE REPORT              I discussed the risks and benefits and potential adverse outcomes of both operative vs non operative treatment of medial malleolar distal tibia fracture with the patient.       Risks of operative intervention include but not limited to bleeding, infection, deep vein thrombosis, pulmonary embolism, death, limb length discrepancy, reflexive sympathetic dystrophy, fat embolism syndrome,damage to blood vessels and nerves, malunion, non-union, delayed union, failure of hardware, post traumatic arthritis, stroke, heart attack, and death. Patient understands that infection may arise and may require numerous surgeries, plastic surgical intervention, and possible limb amputation. Risks of non-operative intervention includes but not limited to pain, malunion, non-union, gait disturbance, etc.  Pt understands and agrees to proceed with operative intervention. OPERATIVE NOTE    Ade De Jesus was taken to the operating room suite on this date: 7/5/2019 . General and saphenous regional anesthesia was used. A regional block yes was performed prior to taking the patient to the OR. The patient was positioned Supine and a small stack of rolled soft sheets were placed under the Right ipsilateral hip in order to gently internally rotate the Right lower extremity. The Right extremity was prepped and draped in the usual sterile fashion using a follow solution: Chlorhexidene soap and yellow prep sticks (2)     A formal verbal  time out was conducted: identifying the patient, identifying the surgical location, reading the informed written signed consent for procedure, confirmation that  the patient did receive preoperative antibiotics and that my signature on the patient was visible at the surgical field. All members of the surgical team agreed to the consent process. The  Right  ipsilateral lower extremity was then exsanguinated with an Esmarch and a tourniquet was insufflated to 300 mmHg and attention was then directed towards surgery. The medial malleolar fracture was approached via 2 small  1.5 cm incisions made through small incisions directly over the malleolus. Incision made through skin, subcutaneous soft tissues and direct exposure of the medial malleolar region of ankle Fx: The medial fracture pattern was as follows: non displaced vertical orientation fracture of there medial malleolar region.     The medial malleolar fracture was definitively fixed with following technique:    4.0 mm Arthrex cannulated screws ( 40 mm length)    Intraoperative fluoroscopic films revealed anatomic alignment of the fibula and of the ankle mortise. The  Medial malleolus fracture was fixed anatomically. The distal tibia and fibula relationship was restored and anatomically aligned. The incision was thoroughly irrigated with sterile and hand pulsatile saline and the tourniquet was deflated and selective electrocautery used for hemostasis. The tourniquet times was 12 minutes at 300 mm HG. The Incision was nice and dry no bleeders were noted at the end of this case. The fibular incision was closed in layers with the following sutures: staples. The medial malleolus incision was closed in the following technique. Each incision was closed tension-free. Sterile dressings were applied which consisted of Xeroform, 4 x 4's, sterile loosely applied Kerlix, and ace wrap (4 inch). Benja Fontana was then transferred from stable condition and no intraoperative complications. Sponge and needle counts were correct times two.         Noemy Harper MD  7/5/2019  9:40 AM

## 2019-07-10 ENCOUNTER — OFFICE VISIT (OUTPATIENT)
Dept: ORTHOPEDIC SURGERY | Age: 69
End: 2019-07-10

## 2019-07-10 VITALS
BODY MASS INDEX: 35.56 KG/M2 | HEIGHT: 75 IN | WEIGHT: 286 LBS | HEART RATE: 64 BPM | OXYGEN SATURATION: 94 % | RESPIRATION RATE: 16 BRPM | DIASTOLIC BLOOD PRESSURE: 74 MMHG | TEMPERATURE: 97.3 F | SYSTOLIC BLOOD PRESSURE: 114 MMHG

## 2019-07-10 DIAGNOSIS — S82.54XD CLOSED NONDISPLACED FRACTURE OF MEDIAL MALLEOLUS OF RIGHT TIBIA WITH ROUTINE HEALING, SUBSEQUENT ENCOUNTER: Primary | ICD-10-CM

## 2019-07-10 DIAGNOSIS — Z98.890 POST-OPERATIVE STATE: ICD-10-CM

## 2019-07-10 NOTE — PATIENT INSTRUCTIONS
· You should expect swelling and bruising in the area over the next several days. You may elevate the surgical extremity on 1 pillow. Place the pillow horizontal so that no pressure is on the back of your heel. You may apply an icepack on top of the dressing to help minimize the swelling. · Keep all pets away from  any wound present in order to prevent infection. · Continue activity modification    · Weight bearing status:  non weight bearing to the surgical extremity    · No lifting, twisting, squatting, deep bending, driving or strenuous activity. PLEASE SEEK IMMEDIATE ASSESSMENT BY ER PHYSICIAN IF ANY OF THE FOLLOWING EXIST:      Excessive pain, swelling, redness or odor of or around the surgical area    Temperature over 100.5    Nausea and vomiting lasting longer than 4 hours or if unable to take medications    Any signs of decreased circulation or nerve impairment to extremity: change in color, persistent numbness, tingling, coldness or increase pain    If any calf pain, calf tightness, shortness of breath, chest pain    Any difficulty breathing at rest or with ambulation, any chest tightness/soreness   Severe intractable pain, persistent swelling or drainage, development of a wound, incisional redness, finger/toe swelling or color changes, or CALF PAIN    · Perform ankle pumps with non-surgical/non-injured extremity to help reduce the risk of blood clots    · Please follow up in 2 weeks     · Continue taking Xarelto as directed      · Maintain proper Nutrition    · Take a multivitamin, calcium + Vitamin D supplement daily (if tolerated)    · If you are a current smoker, quit or limit smoking             Acute Pain After Surgery: Care Instructions  Your Care Instructions    It's common to have some pain after surgery. Pain doesn't mean that something is wrong or that the surgery didn't go well. But when the pain is severe, it's important to work with your doctor to manage it.  It's also important to be aware of a few facts about pain and pain medicine. · You are the only person who knows what your pain feels like. So be sure to tell your doctor when you are in pain or when the pain changes. Then he or she will know how to adjust your medicines. · Pain is often easier to control right after it starts. So it may be better to take regular doses of pain medicine and not wait until the pain gets bad. · Medicine can help control pain. But this doesn't mean you'll have no pain. Medicine works to keep the pain at a level you can live with. With time, you will feel better. Follow-up care is a key part of your treatment and safety. Be sure to make and go to all appointments, and call your doctor if you are having problems. It's also a good idea to know your test results and keep a list of the medicines you take. How can you care for yourself at home? · Be safe with medicines. Read and follow all instructions on the label. ? If the doctor gave you a prescription medicine for pain, take it as prescribed. ? If you are not taking a prescription pain medicine, ask your doctor if you can take an over-the-counter medicine. · If you take an over-the-counter pain medicine, such as acetaminophen (Tylenol), ibuprofen (Advil, Motrin), or naproxen (Aleve), read and follow all instructions on the label. · Do not take two or more pain medicines at the same time unless the doctor told you to. · Do not drink alcohol while you are taking pain medicines. · Try to walk each day if your doctor recommends it. Start by walking a little more than you did the day before. Bit by bit, increase the amount you walk. Walking increases blood flow. It also helps prevent pneumonia and constipation. · To prevent constipation from opioid pain medicines:  ? Talk to your doctor about a laxative. ? Include fruits, vegetables, beans, and whole grains in your diet each day. These foods are high in fiber.   ? Drink plenty of fluids, enough so that your urine is light yellow or clear like water. Drink water, fruit juice, or other drinks that do not contain caffeine or alcohol. If you have kidney, heart, or liver disease and have to limit fluids, talk with your doctor before you increase the amount of fluids you drink. ? Take a fiber supplement, such as Citrucel or Metamucil, every day if needed. Read and follow all instructions on the label. If you take pain medicine for more than a few days, talk to your doctor before you take fiber. When should you call for help? Call your doctor now or seek immediate medical care if:    · Your pain gets worse.     · Your pain is not controlled by medicine.    Watch closely for changes in your health, and be sure to contact your doctor if you have any problems. Where can you learn more? Go to http://ravi-salvador.info/. Enter ([phone] 926-327 in the search box to learn more about \"Acute Pain After Surgery: Care Instructions. \"  Current as of: September 23, 2018  Content Version: 11.9  © 3283-8697 [employer]. Care instructions adapted under license by [employer] (which disclaims liability or warranty for this information). If you have questions about a medical condition or this instruction, always ask your healthcare professional. [address] any warranty or liability for your use of this information.

## 2019-07-10 NOTE — PROGRESS NOTES
Patient: Flor Marin                MRN: [id number]       SSN: [id number]  YOB: 1950                  AGE: 79 y.o. SEX: male    PCP:Stan Holman MD    POST OP OFFICE NOTE  DOS: 7/5/19    Chief Complaint:   Chief Complaint   Patient presents with    Ankle Pain     right anklel pain, post op appt. HPI:     The patient is a 79 y.o. male who presents today for follow up 5 days s/p:    Open Reduction Internal Fixation Right Ankle Medial Malleolar Fracture/possible Bone Graft/c-arm/arthrex/nerve Block - Right    Patient has been primarily NWB to the right lower extremity using crutches. However, he states that he had to drive to his appointment today as there was no one else to drive him. He left the CAM boot at home and presents in soft dressing. Patient reports doing well other than post op pain. . Patient has been instructed not to drive. Patient denies any fever, chills, chest pain, shortness of breath or calf pain. There are no new illness or injuries to report since last seen in the office. Patient is on Xarelto for DVT prophylaxis. PHYSICAL EXAM:     Visit Vitals  /74 (BP 1 Location: Left arm, BP Patient Position: Sitting)   Pulse 64   Temp 97.3 °F (36.3 °C) (Oral)   Resp 16   Ht 6' 3\" (1.905 m)   Wt 286 lb (129.7 kg)   SpO2 94%   BMI 35.75 kg/m²         Pain Scale: 4/10      GEN:  Alert, well developed, well nourished, well appearing 79 y.o. male seated comfortably in no acute distress. Speech normal in context and clarity. Psychiatric: Affect, mood and conduct are appropriate.  Alert, oriented x 3 alert, oriented x 3, memory grossly intact, no dementia  Patient arrives to office via: with assistive device: crutches   Patient accompanied in the examination room by: spouse    M/S EXAMINATION OF: right foot/ankle  DRESSINGS: CDI other than mild soilage on dressing   DRAINAGE: none  INCISION: Incision looks good, skin well approximated, no dehiscence, skin staples in place without disruption. SKIN: moderate edema , no erythema, no  ecchymosis, no warmth    TENDERNESS:  mild tenderness to palpation   NEUROVASCULAR:  grossly intact. Positive distal pulses and capillary refill. DVT ASSESSMENT:  The calf is not tender to palpation. No evidence of DVT seen on physical exam.  ROM: not tested                  RADIOGRAPHS & DIAGNOSTIC STUDIES       X-rays, 3 views of the right ankle reveals post op changes s/p ORIF. Overall alignment is acceptable. Hardware is in good position with no indication of movement or failure. Soft tissue swelling noted. Degenerative changes are noted. Calcified vessels are not present. IMPRESSION:     Encounter Diagnoses     ICD-10-CM ICD-9-CM   1. Closed nondisplaced fracture of medial malleolus of right tibia with routine healing, subsequent encounter S82.54XD V54.16   2. Post-operative state Z98.890 V45.89       PLAN:         Orders Placed This Encounter    AMB POC XRAY, ANKLE; COMPLETE, 3+ VIE    rivaroxaban (XARELTO) 10 mg tablet                · Dressing: changed in the office today. Patient placed in soft dressing and he will apply CAM boot when he gets home    · Keep the current dressings on and in place. You need to keep this incision clean and dry. If you have a splint or cast on please keep this clean and dry as well. · You should expect swelling and bruising in the area over the next several days. You may elevate the surgical extremity on 1 pillow. Place the pillow horizontal so that no pressure is on the back of your heel. You may apply an icepack on top of the dressing to help minimize the swelling. · Keep all pets away from  any wound present in order to prevent infection. · Continue activity modification    · Weight bearing status:  non weight bearing to the surgical extremity    · No lifting, twisting, squatting, deep bending, driving or strenuous activity.     PLEASE SEEK IMMEDIATE ASSESSMENT BY ER PHYSICIAN IF ANY OF THE FOLLOWING EXIST:      Excessive pain, swelling, redness or odor of or around the surgical area    Temperature over 100.5    Nausea and vomiting lasting longer than 4 hours or if unable to take medications    Any signs of decreased circulation or nerve impairment to extremity: change in color, persistent numbness, tingling, coldness or increase pain    If any calf pain, calf tightness, shortness of breath, chest pain    Any difficulty breathing at rest or with ambulation, any chest tightness/soreness   Severe intractable pain, persistent swelling or drainage, development of a wound, incisional redness, finger/toe swelling or color changes, or CALF PAIN    · Perform ankle pumps with non-surgical/non-injured extremity to help reduce the risk of blood clots    · Please follow up in 2 weeks possible suture removal at ROV    · Continue taking Xarelto as directed      · Maintain proper Nutrition    · Take a multivitamin, calcium + Vitamin D supplement daily (if tolerated)    · If you are a current smoker, quit or limit smoking        Patient has been discussed with Dr. Jayjay Ponce during this visit and he agrees with the assessment and plan    Patient expresses understanding of the plan. Patient education provided on post surgical care.       REVIEW OF SYSTEMS:     Review of Systems: Chest pain: no  Shortness of breath: no  Fever: no  Night sweats: no  Weight loss: no  Constitutional signs: no  Review of all other systems is negative    Otherwise as noted in HPI      PAST MEDICAL HISTORY:     Past Medical History:   Diagnosis Date    Anxiety     Arthritis     Bilateral leg edema     Cancer ([facility] [address] 1999    lung    Chronic obstructive pulmonary disease (HCC)     CTS (carpal tunnel syndrome)     DJD (degenerative joint disease) of knee     Bilateral; endstage degenerative arthritis of right knee    Gastric ulcer, unspecified as acute or chronic, without mention of hemorrhage or perforation     patient listed stomach problems or ulcers    GERD (gastroesophageal reflux disease)     Hiatal hernia     History of blood clots 2012    Treated with Coumadin for 1 year    Hypertension     Ill-defined condition 2014    left ulnar artery aneurysm    Knee pain     Lumbar pain     Medial meniscus tear May 2011    Right knee    Sleep apnea     no cpap    SOB (shortness of breath)     Venous stasis of lower extremity        MEDICATIONS:     Current Outpatient Medications   Medication Sig    [START ON 7/21/2019] rivaroxaban (XARELTO) 10 mg tablet Take 1 Tab by mouth daily.  polyethylene glycol (MIRALAX) 17 gram/dose powder Take 17 g by mouth daily for 30 days.  ondansetron hcl (ZOFRAN) 4 mg tablet Take 1 Tab by mouth every eight (8) hours as needed for Nausea.  trimethoprim-sulfamethoxazole (BACTRIM DS) 160-800 mg per tablet Take 1 Tab by mouth two (2) times a day.  promethazine (PHENERGAN) 25 mg tablet Take 1 Tab by mouth every six (6) hours as needed for Nausea.  amLODIPine (NORVASC) 10 mg tablet Take 1 Tab by mouth daily.  terazosin (HYTRIN) 10 mg capsule Take 10 mg by mouth nightly.  mometasone-formoterol (DULERA) 200-5 mcg/actuation HFA inhaler Take 2 Puffs by inhalation two (2) times a day.  tiZANidine (ZANAFLEX) 4 mg tablet Take 2-3 Tabs by mouth three (3) times daily as needed (spasm). Dose change    DIPHENHYDRAMINE HCL (BENADRYL ALLERGY PO) Take  by mouth.  ergocalciferol (VITAMIN D) 50,000 unit capsule Take 50,000 Units by mouth as directed.  buPROPion SR (WELLBUTRIN SR) 150 mg SR tablet Take  by mouth two (2) times a day.  magnesium oxide (MAG-OX) 400 mg tablet Take 400 mg by mouth daily.  omeprazole (PRILOSEC) 20 mg capsule Take 20 mg by mouth two (2) times daily (with meals).  albuterol sulfate (PROVENTIL;VENTOLIN) 2.5 mg/0.5 mL Nebu 2.5 mg by Nebulization route once as needed.  sertraline (ZOLOFT) 100 mg tablet Take 100 mg by mouth daily.     triamcinolone acetonide (KENALOG) 0.1 % ointment Apply  to affected area two (2) times a day. use thin layer      No current facility-administered medications for this visit.         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    HX KNEE ARTHROSCOPY  5/26/2011    Right knee with partial medial meniscectomy of right knee    HX LOBECTOMY Right 1990    partial    HX ORTHOPAEDIC      left thumb joint     HX TONSILLECTOMY      MS RMVL LUNG OTHER THAN PNEUMONECTOMY 1 LOBE LOBECT      right for cancer in 2001 - no chemo        SOCIAL HISTORY:     Social History     Socioeconomic History    Marital status:      Spouse name: Not on file    Number of children: Not on file    Years of education: Not on file    Highest education level: Not on file   Occupational History    Not on file   Social Needs    Financial resource strain: Not on file    Food insecurity:     Worry: Not on file     Inability: Not on file    Transportation needs:     Medical: Not on file     Non-medical: Not on file   Tobacco Use    Smoking status: Never Smoker    Smokeless tobacco: Never Used   Substance and Sexual Activity    Alcohol use: No    Drug use: No    Sexual activity: Not on file   Lifestyle    Physical activity:     Days per week: Not on file     Minutes per session: Not on file    Stress: Not on file   Relationships    Social connections:     Talks on phone: Not on file     Gets together: Not on file     Attends Orthodox service: Not on file     Active member of club or organization: Not on file     Attends meetings of clubs or organizations: Not on file     Relationship status: Not on file    Intimate partner violence: Fear of current or ex partner: Not on file     Emotionally abused: Not on file     Physically abused: Not on file     Forced sexual activity: Not on file   Other Topics Concern    Not on file   Social History Narrative    Not on file       FAMILY HISTORY:     Family History   Problem Relation Age of Onset    Hypertension Father     Asthma Father     Arthritis-osteo Father            Aydin Zamarripa PA-C  7/10/2019

## 2019-07-10 NOTE — PROGRESS NOTES
1. Have you been to the ER, urgent care clinic since your last visit? Hospitalized since your last visit? NO    2. Have you seen or consulted any other health care providers outside of the [address] [place] [address] since your last visit? Include any pap smears or colon screening.  NO

## 2019-07-24 ENCOUNTER — OFFICE VISIT (OUTPATIENT)
Dept: ORTHOPEDIC SURGERY | Age: 69
End: 2019-07-24

## 2019-07-24 VITALS
OXYGEN SATURATION: 90 % | RESPIRATION RATE: 16 BRPM | SYSTOLIC BLOOD PRESSURE: 165 MMHG | DIASTOLIC BLOOD PRESSURE: 87 MMHG | WEIGHT: 286 LBS | HEIGHT: 75 IN | HEART RATE: 55 BPM | BODY MASS INDEX: 35.56 KG/M2 | TEMPERATURE: 97.3 F

## 2019-07-24 DIAGNOSIS — S82.54XD CLOSED NONDISPLACED FRACTURE OF MEDIAL MALLEOLUS OF RIGHT TIBIA WITH ROUTINE HEALING, SUBSEQUENT ENCOUNTER: ICD-10-CM

## 2019-07-24 DIAGNOSIS — E66.01 SEVERE OBESITY (HCC): ICD-10-CM

## 2019-07-24 DIAGNOSIS — B35.3 TINEA PEDIS OF RIGHT FOOT: ICD-10-CM

## 2019-07-24 DIAGNOSIS — Z98.890 POST-OPERATIVE STATE: Primary | ICD-10-CM

## 2019-07-24 RX ORDER — SULFAMETHOXAZOLE AND TRIMETHOPRIM 800; 160 MG/1; MG/1
1 TABLET ORAL 2 TIMES DAILY
Qty: 20 TAB | Refills: 0 | Status: SHIPPED | OUTPATIENT
Start: 2019-07-24

## 2019-07-24 NOTE — PATIENT INSTRUCTIONS
· You may shower, no submerging in any water    · Clean incision daily with Dermal . Allow area to air when in protected environment. Cover with large band aid when wearing the CAM boot    · Follow up with your PCP or ED today regarding your Hiatal Hernia flare    · Rx for Bactrim has been e-scribed to your pharmacy. Please take as directed    · Keep all pets away from  any wound present in order to prevent infection. · Continue activity modification    · Weight bearing status:  non weight bearing to the surgical extremity in CAM boot    · No lifting, twisting, squatting, deep bending, driving or strenuous activity. PLEASE SEEK IMMEDIATE ASSESSMENT BY ER PHYSICIAN IF ANY OF THE FOLLOWING EXIST:      Excessive pain, swelling, redness or odor of or around the surgical area    Temperature over 100.5    Nausea and vomiting lasting longer than 4 hours or if unable to take medications    Any signs of decreased circulation or nerve impairment to extremity: change in color, persistent numbness, tingling, coldness or increase pain    If any calf pain, calf tightness, shortness of breath, chest pain    Any difficulty breathing at rest or with ambulation, any chest tightness/soreness   Severe intractable pain, persistent swelling or drainage, development of a wound, incisional redness, finger/toe swelling or color changes, or CALF PAIN    · Perform ankle pumps with non-surgical/non-injured extremity to help reduce the risk of blood clots    · Please follow up in 3 weeks     · Continue taking Xarelto as directed      · Maintain proper Nutrition    · Take a multivitamin, calcium + Vitamin D supplement daily (if tolerated)    · If you are a current smoker, quit or limit smoking             Acute Pain After Surgery: Care Instructions  Your Care Instructions    It's common to have some pain after surgery. Pain doesn't mean that something is wrong or that the surgery didn't go well.  But when the pain is severe, it's important to work with your doctor to manage it. It's also important to be aware of a few facts about pain and pain medicine. · You are the only person who knows what your pain feels like. So be sure to tell your doctor when you are in pain or when the pain changes. Then he or she will know how to adjust your medicines. · Pain is often easier to control right after it starts. So it may be better to take regular doses of pain medicine and not wait until the pain gets bad. · Medicine can help control pain. But this doesn't mean you'll have no pain. Medicine works to keep the pain at a level you can live with. With time, you will feel better. Follow-up care is a key part of your treatment and safety. Be sure to make and go to all appointments, and call your doctor if you are having problems. It's also a good idea to know your test results and keep a list of the medicines you take. How can you care for yourself at home? · Be safe with medicines. Read and follow all instructions on the label. ? If the doctor gave you a prescription medicine for pain, take it as prescribed. ? If you are not taking a prescription pain medicine, ask your doctor if you can take an over-the-counter medicine. · If you take an over-the-counter pain medicine, such as acetaminophen (Tylenol), ibuprofen (Advil, Motrin), or naproxen (Aleve), read and follow all instructions on the label. · Do not take two or more pain medicines at the same time unless the doctor told you to. · Do not drink alcohol while you are taking pain medicines. · Try to walk each day if your doctor recommends it. Start by walking a little more than you did the day before. Bit by bit, increase the amount you walk. Walking increases blood flow. It also helps prevent pneumonia and constipation. · To prevent constipation from opioid pain medicines:  ? Talk to your doctor about a laxative.   ? Include fruits, vegetables, beans, and whole grains in your diet each day. These foods are high in fiber. ? Drink plenty of fluids, enough so that your urine is light yellow or clear like water. Drink water, fruit juice, or other drinks that do not contain caffeine or alcohol. If you have kidney, heart, or liver disease and have to limit fluids, talk with your doctor before you increase the amount of fluids you drink. ? Take a fiber supplement, such as Citrucel or Metamucil, every day if needed. Read and follow all instructions on the label. If you take pain medicine for more than a few days, talk to your doctor before you take fiber. When should you call for help? Call your doctor now or seek immediate medical care if:    · Your pain gets worse.     · Your pain is not controlled by medicine.    Watch closely for changes in your health, and be sure to contact your doctor if you have any problems. Where can you learn more? Go to http://ravi-salvador.info/. Enter [phone] [phone] in the search box to learn more about \"Acute Pain After Surgery: Care Instructions. \"  Current as of: September 23, 2018  Content Version: 11.9  © 8746-9455 [employer]. Care instructions adapted under license by [employer] (which disclaims liability or warranty for this information). If you have questions about a medical condition or this instruction, always ask your healthcare professional. [address] any warranty or liability for your use of this information.

## 2019-07-24 NOTE — PROGRESS NOTES
1. Have you been to the ER, urgent care clinic since your last visit? Hospitalized since your last visit? No    2. Have you seen or consulted any other health care providers outside of the [address] [place] [address] since your last visit? Include any pap smears or colon screening.  No

## 2019-07-24 NOTE — PROGRESS NOTES
Patient: Armond Nelson                MRN: [id number]       SSN: [id number]  YOB: 1950                  AGE: 81 y.o. SEX: male    PCP:Cyr, Elwyn Alpers, MD    POST OP OFFICE NOTE  DOS: 7/5/19    Chief Complaint:   Chief Complaint   Patient presents with    Ankle Pain     RIGHT ANKLE PAIN       HPI:     The patient is a 81 y.o. male who presents today for follow up 19 days s/p:    Open Reduction Internal Fixation Right Ankle Medial Malleolar Fracture/possible Bone Graft/c-arm/arthrex/nerve Block - Right    Patient has been primarily PWB to the right lower extremity using crutches. Patient reports doing well other than post op pain and some flare of his hiatal hernia. Patient has been previously instructed not to drive. Patient denies any fever, chills, chest pain, shortness of breath or calf pain. There are no new illness or injuries to report since last seen in the office. Patient is on Xarelto for DVT prophylaxis. PHYSICAL EXAM:     Visit Vitals  /87   Pulse (!) 55   Temp 97.3 °F (36.3 °C) (Oral)   Resp 16   Ht 6' 3\" (1.905 m)   Wt 286 lb (129.7 kg)   SpO2 90%   BMI 35.75 kg/m²         Pain Scale: 7/10      GEN:  Alert, well developed, well nourished, well appearing 81 y.o. male seated comfortably in no acute distress. Speech normal in context and clarity. Psychiatric: Affect, mood and conduct are appropriate. Alert, oriented x 3 alert, oriented x 3, memory grossly intact, no dementia  Patient arrives to office via: with assistive device: crutches   Patient accompanied in the examination room by: spouse    M/S EXAMINATION OF: right foot/ankle  DRESSINGS: CDI other than mild soilage on dressing   DRAINAGE: none  INCISION: Incision looks good, skin well approximated, no dehiscence, skin staples in place without disruption. SKIN: mild edema , no erythema, no  ecchymosis, no warmth    TENDERNESS:  mild tenderness to palpation   NEUROVASCULAR:  grossly intact.  Positive distal pulses and capillary refill. DVT ASSESSMENT:  The calf is not tender to palpation. No evidence of DVT seen on physical exam.  ROM: not tested                  RADIOGRAPHS & DIAGNOSTIC STUDIES       X-rays, 3 views of the right ankle reveals post op changes s/p ORIF. Overall alignment is acceptable. Hardware is in good position with no indication of movement or failure. Soft tissue swelling noted. Degenerative changes are noted. Calcified vessels are not present. IMPRESSION:     Encounter Diagnoses     ICD-10-CM ICD-9-CM   1. Post-operative state Z98.890 V45.89   2. Severe obesity ([facility] [address] E66.01 278.01   3. Closed nondisplaced fracture of medial malleolus of right tibia with routine healing, subsequent encounter S82.54XD V54.16   4. Tinea pedis of right foot B35.3 110.4       PLAN:         Orders Placed This Encounter    [57714] Ankle Min 3V           · You may shower, no submerging in any water    · Clean incision daily with Dermal . Allow area to air when in protected environment. Cover with large band aid when wearing the CAM boot    · Follow up with your PCP or ED today regarding your Hiatal Hernia flare    · Rx for Bactrim has been e-scribed to your pharmacy. Please take as directed    · Keep all pets away from  any wound present in order to prevent infection. · Continue activity modification    · Weight bearing status:  non weight bearing to the surgical extremity in CAM boot    · No lifting, twisting, squatting, deep bending, driving or strenuous activity.     PLEASE SEEK IMMEDIATE ASSESSMENT BY ER PHYSICIAN IF ANY OF THE FOLLOWING EXIST:      Excessive pain, swelling, redness or odor of or around the surgical area    Temperature over 100.5    Nausea and vomiting lasting longer than 4 hours or if unable to take medications    Any signs of decreased circulation or nerve impairment to extremity: change in color, persistent numbness, tingling, coldness or increase pain    If any calf pain, calf tightness, shortness of breath, chest pain    Any difficulty breathing at rest or with ambulation, any chest tightness/soreness   Severe intractable pain, persistent swelling or drainage, development of a wound, incisional redness, finger/toe swelling or color changes, or CALF PAIN    · Perform ankle pumps with non-surgical/non-injured extremity to help reduce the risk of blood clots    · Please follow up in 3 weeks     · Continue taking Xarelto as directed      · Maintain proper Nutrition    · Take a multivitamin, calcium + Vitamin D supplement daily (if tolerated)    · If you are a current smoker, quit or limit smoking        Patient has been discussed with Dr. Olivier Dorsey during this visit and he agrees with the assessment and plan    Patient expresses understanding of the plan. Patient education provided on post surgical care.       REVIEW OF SYSTEMS:     Review of Systems: Chest pain: no  Shortness of breath: no  Fever: no  Night sweats: no  Weight loss: no  Constitutional signs: no  Review of all other systems is negative    Otherwise as noted in HPI      PAST MEDICAL HISTORY:     Past Medical History:   Diagnosis Date    Anxiety     Arthritis     Bilateral leg edema     Cancer ([facility] [address] 1999    lung    Chronic obstructive pulmonary disease (HCC)     CTS (carpal tunnel syndrome)     DJD (degenerative joint disease) of knee     Bilateral; endstage degenerative arthritis of right knee    Gastric ulcer, unspecified as acute or chronic, without mention of hemorrhage or perforation     patient listed stomach problems or ulcers    GERD (gastroesophageal reflux disease)     Hiatal hernia     History of blood clots 2012    Treated with Coumadin for 1 year    Hypertension     Ill-defined condition 2014    left ulnar artery aneurysm    Knee pain     Lumbar pain     Medial meniscus tear May 2011    Right knee    Sleep apnea     no cpap    SOB (shortness of breath)     Venous stasis of lower extremity MEDICATIONS:     Current Outpatient Medications   Medication Sig    rivaroxaban (XARELTO) 10 mg tablet Take 1 Tab by mouth daily.  polyethylene glycol (MIRALAX) 17 gram/dose powder Take 17 g by mouth daily for 30 days.  ondansetron hcl (ZOFRAN) 4 mg tablet Take 1 Tab by mouth every eight (8) hours as needed for Nausea.  trimethoprim-sulfamethoxazole (BACTRIM DS) 160-800 mg per tablet Take 1 Tab by mouth two (2) times a day.  promethazine (PHENERGAN) 25 mg tablet Take 1 Tab by mouth every six (6) hours as needed for Nausea.  amLODIPine (NORVASC) 10 mg tablet Take 1 Tab by mouth daily.  terazosin (HYTRIN) 10 mg capsule Take 10 mg by mouth nightly.  mometasone-formoterol (DULERA) 200-5 mcg/actuation HFA inhaler Take 2 Puffs by inhalation two (2) times a day.  tiZANidine (ZANAFLEX) 4 mg tablet Take 2-3 Tabs by mouth three (3) times daily as needed (spasm). Dose change    DIPHENHYDRAMINE HCL (BENADRYL ALLERGY PO) Take  by mouth.  ergocalciferol (VITAMIN D) 50,000 unit capsule Take 50,000 Units by mouth as directed.  buPROPion SR (WELLBUTRIN SR) 150 mg SR tablet Take  by mouth two (2) times a day.  magnesium oxide (MAG-OX) 400 mg tablet Take 400 mg by mouth daily.  omeprazole (PRILOSEC) 20 mg capsule Take 20 mg by mouth two (2) times daily (with meals).  albuterol sulfate (PROVENTIL;VENTOLIN) 2.5 mg/0.5 mL Nebu 2.5 mg by Nebulization route once as needed.  sertraline (ZOLOFT) 100 mg tablet Take 100 mg by mouth daily.  triamcinolone acetonide (KENALOG) 0.1 % ointment Apply  to affected area two (2) times a day. use thin layer      No current facility-administered medications for this visit.         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    HX KNEE ARTHROSCOPY  5/26/2011    Right knee with partial medial meniscectomy of right knee    HX LOBECTOMY Right 1990    partial    HX ORTHOPAEDIC      left thumb joint     HX TONSILLECTOMY      NC RMVL LUNG OTHER THAN PNEUMONECTOMY 1 LOBE LOBECT      right for cancer in 2001 - no chemo        SOCIAL HISTORY:     Social History     Socioeconomic History    Marital status:      Spouse name: Not on file    Number of children: Not on file    Years of education: Not on file    Highest education level: Not on file   Occupational History    Not on file   Social Needs    Financial resource strain: Not on file    Food insecurity:     Worry: Not on file     Inability: Not on file    Transportation needs:     Medical: Not on file     Non-medical: Not on file   Tobacco Use    Smoking status: Never Smoker    Smokeless tobacco: Never Used   Substance and Sexual Activity    Alcohol use: No    Drug use: No    Sexual activity: Not on file   Lifestyle    Physical activity:     Days per week: Not on file     Minutes per session: Not on file    Stress: Not on file   Relationships    Social connections:     Talks on phone: Not on file     Gets together: Not on file     Attends Zoroastrian service: Not on file     Active member of club or organization: Not on file     Attends meetings of clubs or organizations: Not on file     Relationship status: Not on file    Intimate partner violence:     Fear of current or ex partner: Not on file     Emotionally abused: Not on file     Physically abused: Not on file     Forced sexual activity: Not on file   Other Topics Concern    Not on file   Social History Narrative    Not on file       FAMILY HISTORY:     Family History   Problem Relation Age of Onset    Hypertension Father     Asthma Father     Arthritis-osteo Father            Fabrizio Talavera PA-C  7/24/2019

## 2019-08-14 ENCOUNTER — OFFICE VISIT (OUTPATIENT)
Dept: ORTHOPEDIC SURGERY | Age: 69
End: 2019-08-14

## 2019-08-14 VITALS
RESPIRATION RATE: 17 BRPM | TEMPERATURE: 98.3 F | HEART RATE: 63 BPM | OXYGEN SATURATION: 94 % | BODY MASS INDEX: 35.56 KG/M2 | SYSTOLIC BLOOD PRESSURE: 144 MMHG | WEIGHT: 286 LBS | DIASTOLIC BLOOD PRESSURE: 80 MMHG | HEIGHT: 75 IN

## 2019-08-14 DIAGNOSIS — B35.3 TINEA PEDIS OF RIGHT FOOT: ICD-10-CM

## 2019-08-14 DIAGNOSIS — S82.54XD CLOSED NONDISPLACED FRACTURE OF MEDIAL MALLEOLUS OF RIGHT TIBIA WITH ROUTINE HEALING, SUBSEQUENT ENCOUNTER: Primary | ICD-10-CM

## 2019-08-14 DIAGNOSIS — Z98.890 POST-OPERATIVE STATE: ICD-10-CM

## 2019-08-14 RX ORDER — HYDROCODONE BITARTRATE AND ACETAMINOPHEN 7.5; 325 MG/1; MG/1
1-2 TABLET ORAL
Qty: 30 TAB | Refills: 0 | Status: SHIPPED | OUTPATIENT
Start: 2019-08-14 | End: 2019-08-24

## 2019-08-14 NOTE — PATIENT INSTRUCTIONS
Broken Lower Leg: Care Instructions Your Care Instructions Treatment for your broken leg will depend on how bad the break is. Your doctor may have put your lower leg in a splint or a cast to allow it to heal or keep it stable until you see another doctor. It may take weeks or months for your leg to heal. You can help it heal with some care at home. You heal best when you take good care of yourself. Eat a variety of healthy foods, and don't smoke. Follow-up care is a key part of your treatment and safety. Be sure to make and go to all appointments, and call your doctor if you are having problems. It's also a good idea to know your test results and keep a list of the medicines you take. How can you care for yourself at home? · Put ice or a cold pack on your lower leg for 10 to 20 minutes at a time. Try to do this every 1 to 2 hours for the next 3 days (when you are awake). Put a thin cloth between the ice and your cast or splint. Keep your cast or splint dry. · Follow the cast care instructions your doctor gives you. If you have a splint, do not take it off unless your doctor tells you to. · Be safe with medicines. Take pain medicines exactly as directed. ? If the doctor gave you a prescription medicine for pain, take it as prescribed. ? If you are not taking a prescription pain medicine, ask your doctor if you can take an over-the-counter medicine. · Do not put weight on your leg unless your doctor tells you to. Use crutches to walk. · Prop up your leg on pillows when you sit or lie down in the first few days after the injury. Keep your leg higher than the level of your heart. This will help reduce swelling. · Follow instructions for exercises to keep your leg strong. · Wiggle your toes often to reduce swelling and stiffness. When should you call for help? Call 911 anytime you think you may need emergency care. For example, call if:   · You have chest pain, are short of breath, or you cough up blood.  
  · You are very sleepy and you have trouble waking up.  
 Call your doctor now or seek immediate medical care if: 
  · You have new or worse nausea or vomiting.  
  · You have new or worse pain.  
  · Your foot is cool or pale or changes color.  
  · You have tingling, weakness, or numbness in your toes.  
  · Your cast or splint feels too tight.  
  · You have signs of a blood clot in your leg (called a deep vein thrombosis), such as: 
? Pain in your calf, back of the knee, thigh, or groin. ? Redness or swelling in your leg.  
 Watch closely for changes in your health, and be sure to contact your doctor if: 
  · You have a problem with your splint or cast.  
  · You do not get better as expected. Where can you learn more? Go to http://ravi-salvador.info/. Enter L198 in the search box to learn more about \"Broken Lower Leg: Care Instructions. \" Current as of: September 20, 2018 Content Version: 12.1 © 7760-9490 [employer]. Care instructions adapted under license by [employer] (which disclaims liability or warranty for this information). If you have questions about a medical condition or this instruction, always ask your healthcare professional. [address] any warranty or liability for your use of this information.

## 2019-08-14 NOTE — PROGRESS NOTES
AMBULATORY PROGRESS NOTE      Patient: Amber Rodriguez             MRN: [id number]     SSN: [id number] Body mass index is 35.75 kg/m². YOB: 1950     AGE: 81 y.o. SEX: male    PCP: Martínez Marie MD     IMPRESSION/DIAGNOSIS AND TREATMENT PLAN     DIAGNOSES  1. Closed nondisplaced fracture of medial malleolus of right tibia with routine healing, subsequent encounter    2. Post-operative state    3. Tinea pedis of right foot        Orders Placed This Encounter    AMB SUPPLY ORDER    [23806] Ankle Min 3V    HYDROcodone-acetaminophen (NORCO) 7.5-325 mg per tablet      Amber Rodriguez understands his diagnoses and the proposed plan. He arrives here without his CAM walker boot. He has been a bit noncompliant, so I know it is hard to have him not walk on his foot, but he needs to try a little bit more not to walk on the foot. His x-rays today show that the fracture is still not yet healed. I can still see the fracture line. I am concerned he is going to go into a delayed union or nonunion. He is 40 days out. He had been weightbearing essentially right away due to difficulty being able to be nonweightbearing. My plan is listed as below. Plan:    1) Perform ankle pumps as directed. 2) Continue using the CAM boot and the crutches as directed. 3) DME Order: Right AS/AC brace. 4) Use brace when driving. 5) Supplement with calcium and vitamin D. 6) Continue activity modification as directed. 7) Norco 7.5 m-2 PO BIDPRN; 30 tablets, 0 refills.       RTO - 3 weeks // PLEASE OBTAIN X-RAYS OF: right ankle 3 VIEWS      HPI AND EXAMINATION     Joelle Harp IS A 81 y.o. male who presents to my outpatient office for follow up 40 days s/p:     Open Reduction Internal Fixation Right Ankle Medial Malleolar Fracture/possible Bone Graft/c-arm/arthrex/nerve Block - Right    At the last visit, Portia Gannon PA-C, prescribed Bactrim, instructed the patient to continue activity modification as directed, to remain NWB to the surgical extremity in the CAM boot, to clean the incision with dermal spray, to continue taking Xarelto, to maintain nutrition, and to keep pets away from the incision. Since we saw him last, Mr. Coral Maynard reports that he worries he may have hurt his right ankle again. He notes that his mother fell and had to go to the hospital, and as such, he was in and out of the hospital. In addition to this, he had to drive his father to court, and reports that he did not wear his boot while driving his father. He states that other than this, he has been trying to wear his boot as much as possible. He reports that he has been driving quite a bit and notes that he does experience soreness when driving. He states that he also walks with a cane. He adds that he has developed a rash around his medial ankle. He reports that he finished his Bactrim and that he has 1 pill left of Xarelto. He notes that he is allergic to Aspirin. The patient denies h/o DM. He denies smoking. He notes that he was taking 50,000 units of vitamin D and has h/o osteopenia, but notes that he is no longer taking this supplement. He has been instructed to be nonweightbearing, but he arrives here each time weightbearing with crutches. Due to his weakness of his upper extremities, his heart, it is difficult and impossible to say that he can go nonweightbearing. He arrives here in his tall CAM walker boot. His x-rays today actually show that the fracture is still in good position. I do not see much healing occurring yet across this fracture, but the fracture is still secured. There is no superior migration of this fracture fragment. Visit Vitals  /80   Pulse 63   Temp 98.3 °F (36.8 °C) (Oral)   Resp 17   Ht 6' 3\" (1.905 m)   Wt 286 lb (129.7 kg)   SpO2 94%   BMI 35.75 kg/m²     GEN:  Alert, well developed, well nourished, well appearing 81 y.o. male seated comfortably in no acute distress.  Speech normal in context and clarity. Psychiatric: Affect, mood and conduct are appropriate. Alert, oriented x 3 alert, oriented x 3, memory grossly intact, no dementia  Patient arrives to office via: with assistive device: crutches   Patient accompanied in the examination room by: spouse  M/S EXAMINATION OF: right foot/ankle  DRESSINGS: none  DRAINAGE: none  INCISION: Incision looks good, skin well approximated, no dehiscence  SKIN: mild edema , no erythema, no ecchymosis, no warmth, 4.5 cm x 3 cm patch of scaly skin on the medial ankle, not hot, no oozing. TENDERNESS:  mild tenderness to palpation   NEUROVASCULAR:  grossly intact. Positive distal pulses and capillary refill. DVT ASSESSMENT:  The calf is not tender to palpation. No evidence of DVT seen on physical exam.  ROM: not tested    CHART REVIEW     Past Medical History:   Diagnosis Date    Anxiety     Arthritis     Bilateral leg edema     Cancer ([facility] [address] 1999    lung    Chronic obstructive pulmonary disease (HCC)     CTS (carpal tunnel syndrome)     DJD (degenerative joint disease) of knee     Bilateral; endstage degenerative arthritis of right knee    Gastric ulcer, unspecified as acute or chronic, without mention of hemorrhage or perforation     patient listed stomach problems or ulcers    GERD (gastroesophageal reflux disease)     Hiatal hernia     History of blood clots 2012    Treated with Coumadin for 1 year    Hypertension     Ill-defined condition 2014    left ulnar artery aneurysm    Knee pain     Lumbar pain     Medial meniscus tear May 2011    Right knee    Sleep apnea     no cpap    SOB (shortness of breath)     Venous stasis of lower extremity      Current Outpatient Medications   Medication Sig    HYDROcodone-acetaminophen (NORCO) 7.5-325 mg per tablet Take 1-2 Tabs by mouth two (2) times daily as needed for Pain for up to 10 days. Max Daily Amount: 4 Tabs.     trimethoprim-sulfamethoxazole (BACTRIM DS) 160-800 mg per tablet Take 1 Tab by mouth two (2) times a day.  rivaroxaban (XARELTO) 10 mg tablet Take 1 Tab by mouth daily.  ondansetron hcl (ZOFRAN) 4 mg tablet Take 1 Tab by mouth every eight (8) hours as needed for Nausea.  promethazine (PHENERGAN) 25 mg tablet Take 1 Tab by mouth every six (6) hours as needed for Nausea.  amLODIPine (NORVASC) 10 mg tablet Take 1 Tab by mouth daily.  terazosin (HYTRIN) 10 mg capsule Take 10 mg by mouth nightly.  mometasone-formoterol (DULERA) 200-5 mcg/actuation HFA inhaler Take 2 Puffs by inhalation two (2) times a day.  tiZANidine (ZANAFLEX) 4 mg tablet Take 2-3 Tabs by mouth three (3) times daily as needed (spasm). Dose change    DIPHENHYDRAMINE HCL (BENADRYL ALLERGY PO) Take  by mouth.  ergocalciferol (VITAMIN D) 50,000 unit capsule Take 50,000 Units by mouth as directed.  buPROPion SR (WELLBUTRIN SR) 150 mg SR tablet Take  by mouth two (2) times a day.  magnesium oxide (MAG-OX) 400 mg tablet Take 400 mg by mouth daily.  omeprazole (PRILOSEC) 20 mg capsule Take 20 mg by mouth two (2) times daily (with meals).  albuterol sulfate (PROVENTIL;VENTOLIN) 2.5 mg/0.5 mL Nebu 2.5 mg by Nebulization route once as needed.  sertraline (ZOLOFT) 100 mg tablet Take 100 mg by mouth daily.  triamcinolone acetonide (KENALOG) 0.1 % ointment Apply  to affected area two (2) times a day. use thin layer      No current facility-administered medications for this visit.       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    HX KNEE ARTHROSCOPY  5/26/2011    Right knee with partial medial meniscectomy of right knee    HX LOBECTOMY Right 1990    partial    HX ORTHOPAEDIC      left thumb joint     HX TONSILLECTOMY      [facility] RMVL LUNG OTHER THAN PNEUMONECTOMY 1 LOBE LOBECT      right for cancer in 2001 - no chemo      Social History     Occupational History    Not on file   Tobacco Use    Smoking status: Never Smoker    Smokeless tobacco: Never Used   Substance and Sexual Activity    Alcohol use: No    Drug use: No    Sexual activity: Not on file     Family History   Problem Relation Age of Onset    Hypertension Father     Asthma Father     Arthritis-osteo Father     Stroke Father         REVIEW OF SYSTEMS : 8/14/2019  ALL BELOW ARE Negative except : SEE HPI     Constitutional: Negative for fever, chills and weight loss. Neg Weight Loss  Cardiovascular: Negative for chest pain, claudication and leg swelling. SOB, SHAH   Gastrointestinal/Urological: Negative for  pain, N/V/D/C, Blood in stool or urine,dysuria                         Hematuria, Incontinence, pelvic pain  Musculoskeletal: see HPI. Neurological: Negative for dizziness and weakness, headaches,Visual Changes             Confusion,  Or Seizures,   Psychiatric/Behavioral: Negative for depression, memory loss and substance abuse. Extremities:  Negative for hair changes, rash or skin lesion changes. Hematologic: Negative for Bleeding problems, bruising, pallor or swollen lymph nodes. Peripheral Vascular: No calf pain, vascular vein tenderness to calf pain              No calf throbbing, posterior knee throbbing pain     DIAGNOSTIC IMAGING     No notes on file    Please see above section of this report. I have personally reviewed the results of the above study. The interpretation of this study is my professional opinion. Written by Nancymon Elio, as dictated by Dr. Mandeep Noland. I, Dr. Mandeep Noland, confirm that all documentation is accurate.

## 2021-05-13 NOTE — ED TRIAGE NOTES
Patient Education     Pharyngitis (Sore Throat), Report Pending    Pharyngitis (sore throat) is often due to a virus. It can also be caused by streptococcus (strep), bacteria. This is often called strep throat. Both viral and strep infections can cause throat pain that is worse when swallowing, aching all over, headache, and fever. Both types of infections are contagious. They may be spread by coughing, kissing, or touching others after touching your mouth or nose.  A test has been done to find out if you or your child have strep throat. Call this facility or your healthcare provider if you were not given your test results. If the test is positive for strep infection, you will need to take antibiotic medicines. A prescription can be called into your pharmacy at that time. If the test is negative, you probably have a viral pharyngitis. This does not need to be treated with antibiotics. Until you receive the results of the strep test, you should stay home from work. If your child is being tested, he or she should stay home from school.  Home care  · Rest at home. Drink plenty of fluids so you won't get dehydrated.  · If the test is positive for strep, you or your child should not go to work or school for the first 2 days of taking the antibiotics. After this time, you or your child will not be contagious. You or your child can then return to work or school when feeling better.   · Use the antibiotic medicine for the full 10 days. Do not stop the medicine even if you or your child feel better. This is very important to make sure the infection is fully treated. It is also important to prevent medicine-resistant germs from growing. If you or your child were given an antibiotic shot, no more antibiotics are needed.  · Use throat lozenges or numbing throat sprays to help reduce pain. Gargling with warm salt water will also help reduce throat pain. Dissolve 1/2 teaspoon of salt in 1 glass of warm water. Children can sip on  Pt came via EMS from home. States been sick for [place]. Pt states \"I have a hernia here (holds hand on LUQ)\" Pt n/v, pain 10/10 juice or a popsicle. Children 5 years and older can also suck on a lollipop or hard candy.  · Don't eat salty or spicy foods or give them to your child. These can irritate the throat.  Other medicine for a child: You can give your child acetaminophen for fever, fussiness, or discomfort. In babies over 6 months of age, you may use ibuprofen instead of acetaminophen. If your child has chronic liver or kidney disease or ever had a stomach ulcer or GI bleeding, talk with your child’s healthcare provider before giving these medicines. Aspirin should never be used by any child under 18 years of age who has a fever. It may cause severe liver damage.  Other medicine for an adult: You may use acetaminophen or ibuprofen to control pain or fever, unless another medicine was prescribed for this. If you have chronic liver or kidney disease or ever had a stomach ulcer or GI bleeding, talk with your healthcare provider before using these medicines.  Follow-up care  Follow up with your healthcare provider or our staff if you or your child don't get better over the next week.  When to seek medical advice  Call your healthcare provider right away if any of these occur:  · Fever as directed by your healthcare provider. For children, seek care if:  ? Your child is of any age and has repeated fevers above 104°F (40°C).  ? Your child is younger than 2 years of age and has a fever of 100.4°F (38°C) for more than 1 day.  ? Your child is 2 years old or older and has a fever of 100.4°F (38°C) for more than 3 days.  · New or worsening ear pain, sinus pain, or headache  · Painful lumps in the back of neck  · Stiff neck  · Lymph nodes are getting larger  · •Can’t swallow liquids, a lot of drooling, or can’t open mouth wide due to throat pain  · Signs of dehydration, such as very dark urine or no urine, sunken eyes, dizziness  · Trouble breathing or noisy breathing  · Muffled voice  · New rash  · Other symptoms getting worse  Prevention  Here  are steps you can take to help prevent an infection:  · Keep good hand washing habits.  · Don’t have close contact with people who have sore throats, colds, or other upper respiratory infections.  · Don’t smoke, and stay away from secondhand smoke.  · Stay up to date with of your vaccines.  Date Last Reviewed: 11/1/2017  © 5071-8177 [employer]. [address] [place] [address], [place] [address]. All rights reserved. This information is not intended as a substitute for professional medical care. Always follow your healthcare professional's instructions.

## 2021-10-20 ENCOUNTER — TRANSCRIBE ORDER (OUTPATIENT)
Dept: SCHEDULING | Age: 71
End: 2021-10-20

## 2021-10-20 DIAGNOSIS — R10.9 ABDOMINAL PAIN: Primary | ICD-10-CM

## 2022-03-19 PROBLEM — T46.4X5A ACE INHIBITOR-AGGRAVATED ANGIOEDEMA: Status: ACTIVE | Noted: 2018-07-15

## 2022-03-19 PROBLEM — E66.01 SEVERE OBESITY: Status: ACTIVE | Noted: 2019-07-24

## 2022-03-19 PROBLEM — T78.3XXA ANGIO-EDEMA: Status: ACTIVE | Noted: 2018-07-15

## 2022-03-19 PROBLEM — T78.3XXA ANGIOEDEMA: Status: ACTIVE | Noted: 2018-07-15

## 2022-03-19 PROBLEM — T78.3XXA ACE INHIBITOR-AGGRAVATED ANGIOEDEMA: Status: ACTIVE | Noted: 2018-07-15

## 2022-03-19 PROBLEM — C34.91 CARCINOMA OF RIGHT LUNG: Status: ACTIVE | Noted: 2019-06-27

## 2025-04-15 ENCOUNTER — OFFICE VISIT (OUTPATIENT)
Age: 75
End: 2025-04-15

## 2025-04-15 VITALS — WEIGHT: 111.2 LBS | BODY MASS INDEX: 13.9 KG/M2

## 2025-04-15 DIAGNOSIS — M25.512 LEFT SHOULDER PAIN, UNSPECIFIED CHRONICITY: Primary | ICD-10-CM

## 2025-04-15 DIAGNOSIS — M25.511 RIGHT SHOULDER PAIN, UNSPECIFIED CHRONICITY: ICD-10-CM

## 2025-04-15 DIAGNOSIS — M19.011 GLENOHUMERAL ARTHRITIS, RIGHT: ICD-10-CM

## 2025-04-15 DIAGNOSIS — M19.012 GLENOHUMERAL ARTHRITIS, LEFT: ICD-10-CM

## 2025-04-15 RX ORDER — TRIAMCINOLONE ACETONIDE 40 MG/ML
80 INJECTION, SUSPENSION INTRA-ARTICULAR; INTRAMUSCULAR ONCE
Status: COMPLETED | OUTPATIENT
Start: 2025-04-15 | End: 2025-04-15

## 2025-04-15 RX ADMIN — TRIAMCINOLONE ACETONIDE 80 MG: 40 INJECTION, SUSPENSION INTRA-ARTICULAR; INTRAMUSCULAR at 16:07

## 2025-04-15 NOTE — PROGRESS NOTES
more      2. Right shoulder pain, unspecified chronicity  M25.511 [71120] Shoulder 2V or more      3. Glenohumeral arthritis, right  M19.011 AMB POC US DRAIN/INJECT LARGE JOINT/BURSA     triamcinolone acetonide (KENALOG-40) injection 80 mg      4. Glenohumeral arthritis, left  M19.012 AMB POC US DRAIN/INJECT LARGE JOINT/BURSA     triamcinolone acetonide (KENALOG-40) injection 80 mg           PLAN:   1. Pt presents today with bilateral shoulder pain secondary to glenohumeral arthritis.  Having great difficulty with movement secondary to pain due to his increasing pain I believe the next steps will be to inject to the shoulders. After sterile prepped, 6 and 1 kenalog was injected into the bilateral shoulders.   Risk factors include:   2. Yes cortisone injection indicated today  3. No Physical/Occupational Therapy indicated today  4. No diagnostic test indicated today   5. No durable medical equipment indicated today  6. No referral indicated today   7. No medications indicated today:   8. No Narcotic indicated today for short term acute pain secondary to glenohumeral arthritis. Patient given pain medication for short term acute pain relief. Goal is to treat patient according to above plan and to ultimately have patient off all pain medications once appropriate. If chronic pain management is required beyond what is expected for current orthopedic problem, will refer patient to pain management.  was reviewed and will be reviewed with every medication refill request.     RTC 4 weeks         By signing my name below, I SUSANNA Harrell, attest that this documentation has been prepared under the direction and in the presence of Alex Pennington MD  Electronically signed: SUSANNA Harrell. 4/15/2025 12:25 PM EDT    I, Alex Pennington MD, personally performed the services described in this documentation. I have authorized the minaibe to complete the medical record entries input within this chart.

## (undated) DEVICE — DRESSING,GAUZE,XEROFORM,CURAD,1"X8",ST: Brand: CURAD

## (undated) DEVICE — C-ARMOR C-ARM EQUIPMENT COVERS CLEAR STERILE UNIVERSAL FIT 12 PER CASE: Brand: C-ARMOR

## (undated) DEVICE — INTENDED FOR TISSUE SEPARATION, AND OTHER PROCEDURES THAT REQUIRE A SHARP SURGICAL BLADE TO PUNCTURE OR CUT.: Brand: BARD-PARKER SAFETY BLADES SIZE 15, STERILE

## (undated) DEVICE — PADDING CAST SOF-ROL 6INX4YD --

## (undated) DEVICE — GAUZE,SPONGE,4"X4",16PLY,STRL,LF,10/TRAY: Brand: MEDLINE

## (undated) DEVICE — Device

## (undated) DEVICE — SOLUTION SCRB 4OZ 4% CHG CLN BASE FOR PT SKIN ANTISEPSIS

## (undated) DEVICE — SUTURE MCRYL SZ 3-0 L27IN ABSRB UD L26MM SH 1/2 CIR Y416H

## (undated) DEVICE — DRAPE XR C ARM 41X74IN LF --

## (undated) DEVICE — SOLUTION IV 1000ML 0.9% SOD CHL

## (undated) DEVICE — REM POLYHESIVE ADULT PATIENT RETURN ELECTRODE: Brand: VALLEYLAB

## (undated) DEVICE — SOFT SILICONE HYDROCELLULAR SACRUM DRESSING WITH LOCK AWAY LAYER: Brand: ALLEVYN LIFE SACRUM (LARGE) PACK OF 10

## (undated) DEVICE — 3M™ BAIR PAWS FLEX™ WARMING GOWN, STANDARD, 20 PER CASE 81003: Brand: BAIR PAWS™

## (undated) DEVICE — PADDING CAST SOF-ROL 4INX4YD -- NS

## (undated) DEVICE — PAD,ABDOMINAL,8"X10",ST,LF: Brand: MEDLINE

## (undated) DEVICE — WRAP COMPR W4INXL5YD NONSTERILE TAN SELF ADH COBAN

## (undated) DEVICE — PACK SURG BSHR TOT KNEE LF

## (undated) DEVICE — KIT CLN UP BON SECOURS MARYV

## (undated) DEVICE — STERILE POLYISOPRENE POWDER-FREE SURGICAL GLOVES: Brand: PROTEXIS

## (undated) DEVICE — BANDAGE COMPR EXSANGUATION SGL LAYERED NO CLSR 9FT LEN 4IN W

## (undated) DEVICE — FLEX ADVANTAGE 3000CC: Brand: FLEX ADVANTAGE

## (undated) DEVICE — SUTURE VCRL SZ 0 L27IN ABSRB UD L26MM CT-2 1/2 CIR J270H

## (undated) DEVICE — INTENDED FOR TISSUE SEPARATION, AND OTHER PROCEDURES THAT REQUIRE A SHARP SURGICAL BLADE TO PUNCTURE OR CUT.: Brand: BARD-PARKER SAFETY BLADES SIZE 10, STERILE

## (undated) DEVICE — NEEDLE SPNL 22GA L3.5IN BLK HUB S STL REG WALL FIT STYL W/

## (undated) DEVICE — Z DISCONTINUED BY MEDLINE USE 2711682 TRAY SKIN PREP DRY W/ PREM GLV

## (undated) DEVICE — SUTURE VCRL SZ 2-0 L27IN ABSRB UD L26MM SH 1/2 CIR J417H

## (undated) DEVICE — (D)PREP SKN CHLRAPRP APPL 26ML -- CONVERT TO ITEM 371833

## (undated) DEVICE — SUTURE VCRL SZ 3-0 L27IN ABSRB UD L26MM SH 1/2 CIR J416H

## (undated) DEVICE — KENDALL SCD EXPRESS SLEEVES, KNEE LENGTH, MEDIUM: Brand: KENDALL SCD